# Patient Record
Sex: MALE | Race: WHITE | Employment: OTHER | URBAN - METROPOLITAN AREA
[De-identification: names, ages, dates, MRNs, and addresses within clinical notes are randomized per-mention and may not be internally consistent; named-entity substitution may affect disease eponyms.]

---

## 2017-01-05 ENCOUNTER — PATIENT OUTREACH (OUTPATIENT)
Dept: FAMILY MEDICINE CLINIC | Facility: CLINIC | Age: 67
End: 2017-01-05

## 2017-01-20 ENCOUNTER — PRIOR ORIGINAL RECORDS (OUTPATIENT)
Dept: OTHER | Age: 67
End: 2017-01-20

## 2017-01-20 ENCOUNTER — OFFICE VISIT (OUTPATIENT)
Dept: FAMILY MEDICINE CLINIC | Facility: CLINIC | Age: 67
End: 2017-01-20

## 2017-01-20 VITALS
DIASTOLIC BLOOD PRESSURE: 88 MMHG | SYSTOLIC BLOOD PRESSURE: 132 MMHG | TEMPERATURE: 97 F | RESPIRATION RATE: 16 BRPM | WEIGHT: 196 LBS | HEART RATE: 76 BPM | BODY MASS INDEX: 27.44 KG/M2 | HEIGHT: 71 IN

## 2017-01-20 DIAGNOSIS — Z28.21 IMMUNIZATION NOT CARRIED OUT BECAUSE OF PATIENT REFUSAL: ICD-10-CM

## 2017-01-20 DIAGNOSIS — Z79.82 ENCOUNTER FOR LONG-TERM (CURRENT) USE OF ASPIRIN: ICD-10-CM

## 2017-01-20 DIAGNOSIS — Z85.038 HISTORY OF COLON CANCER: ICD-10-CM

## 2017-01-20 DIAGNOSIS — Z86.79 HISTORY OF ATRIAL FIBRILLATION: ICD-10-CM

## 2017-01-20 DIAGNOSIS — Z12.5 SCREENING FOR PROSTATE CANCER: ICD-10-CM

## 2017-01-20 DIAGNOSIS — R93.0 ABNORMAL MAGNETIC RESONANCE IMAGING OF HEAD: ICD-10-CM

## 2017-01-20 DIAGNOSIS — I35.0 NONRHEUMATIC AORTIC VALVE STENOSIS: ICD-10-CM

## 2017-01-20 DIAGNOSIS — C18.7 CANCER OF SIGMOID COLON (HCC): ICD-10-CM

## 2017-01-20 DIAGNOSIS — E11.42 TYPE 2 DIABETES MELLITUS WITH DIABETIC POLYNEUROPATHY, WITHOUT LONG-TERM CURRENT USE OF INSULIN (HCC): ICD-10-CM

## 2017-01-20 DIAGNOSIS — E11.22 TYPE 2 DIABETES MELLITUS WITH STAGE 3 CHRONIC KIDNEY DISEASE, WITHOUT LONG-TERM CURRENT USE OF INSULIN (HCC): ICD-10-CM

## 2017-01-20 DIAGNOSIS — Z00.00 ENCOUNTER FOR ANNUAL HEALTH EXAMINATION: Primary | ICD-10-CM

## 2017-01-20 DIAGNOSIS — Z89.412 STATUS POST AMPUTATION OF GREAT TOE, LEFT (HCC): ICD-10-CM

## 2017-01-20 DIAGNOSIS — N18.30 TYPE 2 DIABETES MELLITUS WITH STAGE 3 CHRONIC KIDNEY DISEASE, WITHOUT LONG-TERM CURRENT USE OF INSULIN (HCC): ICD-10-CM

## 2017-01-20 DIAGNOSIS — I73.9 PVD (PERIPHERAL VASCULAR DISEASE) (HCC): ICD-10-CM

## 2017-01-20 DIAGNOSIS — M20.42 HAMMER TOE OF LEFT FOOT: ICD-10-CM

## 2017-01-20 DIAGNOSIS — Z91.81 AT RISK FOR FALLING: ICD-10-CM

## 2017-01-20 DIAGNOSIS — N18.30 CHRONIC KIDNEY DISEASE, STAGE 3, MOD DECREASED GFR (HCC): ICD-10-CM

## 2017-01-20 DIAGNOSIS — Z87.39 HISTORY OF OSTEOMYELITIS: ICD-10-CM

## 2017-01-20 DIAGNOSIS — Z23 NEED FOR VACCINATION: ICD-10-CM

## 2017-01-20 LAB
BASOPHILS # BLD AUTO: 0.04 X10(3) UL (ref 0–0.1)
BASOPHILS NFR BLD AUTO: 0.6 %
BUN BLD-MCNC: 25 MG/DL (ref 8–20)
CALCIUM BLD-MCNC: 9.5 MG/DL (ref 8.3–10.3)
CHLORIDE: 105 MMOL/L (ref 101–111)
CHOLEST SMN-MCNC: 156 MG/DL (ref ?–200)
CO2: 23 MMOL/L (ref 22–32)
COMPLEXED PSA SERPL-MCNC: 1.34 NG/ML (ref 0.01–4)
CREAT BLD-MCNC: 2.49 MG/DL (ref 0.7–1.3)
EOSINOPHIL # BLD AUTO: 0.26 X10(3) UL (ref 0–0.3)
EOSINOPHIL NFR BLD AUTO: 3.9 %
ERYTHROCYTE [DISTWIDTH] IN BLOOD BY AUTOMATED COUNT: 13.1 % (ref 11.5–16)
EST. AVERAGE GLUCOSE BLD GHB EST-MCNC: 143 MG/DL (ref 68–126)
GLUCOSE BLD-MCNC: 241 MG/DL (ref 70–99)
HBA1C MFR BLD HPLC: 6.6 % (ref ?–5.7)
HCT VFR BLD AUTO: 44.6 % (ref 37–53)
HDLC SERPL-MCNC: 39 MG/DL (ref 45–?)
HDLC SERPL: 4 {RATIO} (ref ?–4.97)
HGB BLD-MCNC: 15.5 G/DL (ref 13–17)
IMMATURE GRANULOCYTE COUNT: 0.02 X10(3) UL (ref 0–1)
IMMATURE GRANULOCYTE RATIO %: 0.3 %
LDLC SERPL DIRECT ASSAY-MCNC: 64 MG/DL (ref ?–100)
LYMPHOCYTES # BLD AUTO: 1.49 X10(3) UL (ref 0.9–4)
LYMPHOCYTES NFR BLD AUTO: 22.3 %
MCH RBC QN AUTO: 29 PG (ref 27–33.2)
MCHC RBC AUTO-ENTMCNC: 34.8 G/DL (ref 31–37)
MCV RBC AUTO: 83.5 FL (ref 80–99)
MONOCYTES # BLD AUTO: 0.4 X10(3) UL (ref 0.1–0.6)
MONOCYTES NFR BLD AUTO: 6 %
NEUTROPHIL ABS PRELIM: 4.47 X10 (3) UL (ref 1.3–6.7)
NEUTROPHILS # BLD AUTO: 4.47 X10(3) UL (ref 1.3–6.7)
NEUTROPHILS NFR BLD AUTO: 66.9 %
NONHDLC SERPL-MCNC: 117 MG/DL (ref ?–130)
PLATELET # BLD AUTO: 118 10(3)UL (ref 150–450)
POTASSIUM SERPL-SCNC: 4.4 MMOL/L (ref 3.6–5.1)
RBC # BLD AUTO: 5.34 X10(6)UL (ref 3.8–5.8)
RED CELL DISTRIBUTION WIDTH-SD: 39.3 FL (ref 35.1–46.3)
SODIUM SERPL-SCNC: 139 MMOL/L (ref 136–144)
TRIGLYCERIDES: 663 MG/DL (ref ?–150)
WBC # BLD AUTO: 6.7 X10(3) UL (ref 4–13)

## 2017-01-20 PROCEDURE — 96160 PT-FOCUSED HLTH RISK ASSMT: CPT | Performed by: FAMILY MEDICINE

## 2017-01-20 PROCEDURE — 85025 COMPLETE CBC W/AUTO DIFF WBC: CPT | Performed by: FAMILY MEDICINE

## 2017-01-20 PROCEDURE — 80048 BASIC METABOLIC PNL TOTAL CA: CPT | Performed by: FAMILY MEDICINE

## 2017-01-20 PROCEDURE — 83721 ASSAY OF BLOOD LIPOPROTEIN: CPT | Performed by: FAMILY MEDICINE

## 2017-01-20 PROCEDURE — 80061 LIPID PANEL: CPT | Performed by: FAMILY MEDICINE

## 2017-01-20 PROCEDURE — 83036 HEMOGLOBIN GLYCOSYLATED A1C: CPT | Performed by: FAMILY MEDICINE

## 2017-01-20 NOTE — PROGRESS NOTES
Katharine Nickerson is a 77year old male who presents for a MA Supervisit. Doing well today overall. Gained another 12 lbs since he was last here (about 3 months ago).       Patient Care Team: Patient Care Team:  Eric Lopez DO as PCP - General ([facility] 180 tablet Rfl: 0   pregabalin (LYRICA) 75 MG Oral Cap Take 1 capsule (75 mg total) by mouth 3 (three) times daily. Disp: 154 capsule Rfl: 0   Omega-3 Fatty Acids (FISH OIL) 1200 MG Oral Cap Take 4 capsules by mouth [email or web address]  Disp:  Rfl:    multiple angelo well-being?: Social Interaction    If you are a male age 38-65 or a female age 47-67, do you take aspirin?: Yes    Have you had any immunizations at another office such as Influenza, Hepatitis B, Tetanus, or Pneumococcal?: No     Functional Ability     [facility] Correct    What year is it?: Correct    Recall \"Ball\": Correct    Recall \"Flag\": Correct    Recall \"Tree\": Correct         PREVENTATIVE SERVICES  INDICATIONS AND SCHEDULE Internal Lab or Procedure External Lab or Procedure   Diabetes Screening      H Annual Monitoring of Persistent     Medications (ACE/ARB, digoxin, diuretics)    Potassium  Annually POTASSIUM (mmol/L)   Date Value   11/03/2016 4.6    No flowsheet data found.     Creatinine  Annually CREATININE (mg/dL)   Date Value   11/03/2016 2.29* History of blood transfusion      March/2016   • Renal disorder    • History of stomach ulcers      recent gi bleed-March/2016   • Generalized weakness      uses a cane   • High blood pressure    • Esophageal reflux    • Muscle weakness    • Abdominal mass headaches  PSYCHE: denies depression or anxiety  HEMATOLOGIC: denies hx of anemia  ENDOCRINE: denies thyroid history  ALL/ASTHMA: denies hx of allergy or asthma    EXAM:   /88 mmHg  Pulse 76  Temp(Src) 96.9 °F (36.1 °C) (Tympanic)  Resp 16  Ht 71\" decreased GFR  -     Basic Metabolic Panel (8) [E]  -     Hemoglobin A1C [E]  - recheck labs today.      Type 2 diabetes mellitus with diabetic polyneuropathy, without long-term current use of insulin (HCC)  -     Basic Metabolic Panel (8) [E]  -     Hemogl selected administration types on file for this patient.

## 2017-01-20 NOTE — PROGRESS NOTES
Last A1C 5.9 on 10/13/16  Last urine microalbumin 3/31/16  Last lipid 4/5/16  Last foot exam 1/19/16  Last eye exam 10/14/16

## 2017-01-20 NOTE — PATIENT INSTRUCTIONS
Nelsy Ariza's SCREENING SCHEDULE   Tests on this list are recommended by your physician but may not be covered, or covered at this frequency, by your insurer. Please check with your insurance carrier before scheduling to verify coverage.     Kirk Schmidt Covered every 10 years- more often if abnormal Colonoscopy,10 Years due on 04/03/2026 Update Health Maintenance if applicable    Flex Sigmoidoscopy Screen  Covered every 5 years No results found for this or any previous visit. No flowsheet data found. for this or any previous visit. This may be covered with your prescription benefits, but Medicare does not cover unless Medically needed    Zoster (Not covered by Medicare Part B) No orders found for this or any previous visit.  This may be covered with you have skid proof backing. ? Do not use slippery wax on bare floors. ? Keep furniture in its accustomed place. ? If you have pets, be careful that you don’t trip over them. OUTSIDE SAFETY TIPS  ?  Always wear good shoes with proper support and traction

## 2017-01-21 ENCOUNTER — LAB ENCOUNTER (OUTPATIENT)
Dept: LAB | Age: 67
End: 2017-01-21
Attending: FAMILY MEDICINE
Payer: MEDICARE

## 2017-01-21 DIAGNOSIS — E11.65 TYPE 2 DIABETES MELLITUS WITH HYPERGLYCEMIA (HCC): Primary | ICD-10-CM

## 2017-01-21 LAB
CREAT UR-SCNC: 56.2 MG/DL
MICROALBUMIN UR-MCNC: 6.32 MG/DL
MICROALBUMIN/CREAT 24H UR-RTO: 112.5 UG/MG (ref ?–30)

## 2017-01-21 PROCEDURE — 82570 ASSAY OF URINE CREATININE: CPT

## 2017-01-21 PROCEDURE — 82043 UR ALBUMIN QUANTITATIVE: CPT

## 2017-01-23 DIAGNOSIS — E78.1 HYPERTRIGLYCERIDEMIA: ICD-10-CM

## 2017-01-23 DIAGNOSIS — E11.42 TYPE 2 DIABETES MELLITUS WITH DIABETIC POLYNEUROPATHY, WITHOUT LONG-TERM CURRENT USE OF INSULIN (HCC): Primary | ICD-10-CM

## 2017-01-24 ENCOUNTER — TELEPHONE (OUTPATIENT)
Dept: CARDIOLOGY CLINIC | Facility: HOSPITAL | Age: 67
End: 2017-01-24

## 2017-01-24 ENCOUNTER — TELEPHONE (OUTPATIENT)
Dept: FAMILY MEDICINE CLINIC | Facility: CLINIC | Age: 67
End: 2017-01-24

## 2017-01-24 ENCOUNTER — PRIOR ORIGINAL RECORDS (OUTPATIENT)
Dept: OTHER | Age: 67
End: 2017-01-24

## 2017-01-24 DIAGNOSIS — E11.42 TYPE 2 DIABETES MELLITUS WITH DIABETIC POLYNEUROPATHY, WITHOUT LONG-TERM CURRENT USE OF INSULIN (HCC): Primary | ICD-10-CM

## 2017-01-24 DIAGNOSIS — E78.1 PURE HYPERGLYCERIDEMIA: ICD-10-CM

## 2017-01-24 RX ORDER — GLIPIZIDE 5 MG/1
5 TABLET, FILM COATED, EXTENDED RELEASE ORAL DAILY
Qty: 90 TABLET | Refills: 0 | Status: SHIPPED | OUTPATIENT
Start: 2017-01-24 | End: 2017-04-17

## 2017-01-24 RX ORDER — ATORVASTATIN CALCIUM 10 MG/1
10 TABLET, FILM COATED ORAL NIGHTLY
Qty: 90 TABLET | Refills: 0 | Status: SHIPPED | OUTPATIENT
Start: 2017-01-24 | End: 2017-04-25

## 2017-01-24 NOTE — TELEPHONE ENCOUNTER
Patient wife was notified of lab results.   States she would like Glipizide and Atorvastatin sent to [facility] [address] in [place]    Routed to Dr Shanel Simental for orders

## 2017-01-24 NOTE — PROGRESS NOTES
I spoke with patient's wife and provided preop/admission instructions for upcoming transcatheter valve surgery.  I advised him to come in the day prior to surgery Wed 2/1 at 11am, Melissa reyes and register as a direct admission with ER registration.  He is

## 2017-01-26 PROBLEM — Z86.79 HISTORY OF ATRIAL FIBRILLATION: Status: RESOLVED | Noted: 2017-01-26 | Resolved: 2017-01-26

## 2017-01-26 PROBLEM — Z86.79 HISTORY OF ATRIAL FIBRILLATION: Status: ACTIVE | Noted: 2017-01-26

## 2017-01-26 NOTE — H&P
[facility] Patient Status:  Inpatient    1950 MRN [id number]   [facility] 8NE-A Attending Juan Kelly MD   Hosp Day #  PCP Gabriele Orta DO     PCP:   Dr. Tiwari Organ  Cardiologist:  Dr. Sage Duque ENDOSCOPY,EXAM      COLONOSCOPY N/A 4/3/2016    Comment Procedure: COLONOSCOPY;  Surgeon: Erick Zheng MD;  Location: [address] [place] [address] ENDOSCOPY    COLONOSCOPY N/A 4/1/2016    Comment Procedure: COLONOSCOPY;  Surgeon: Erick Zheng MD;  Location: [address] [place] [address] illness    Physical Exam:   There were no vitals taken for this visit. No data recorded.      No intake or output data in the 24 hours ending 01/26/17 1054  Weight: 86kg    Telemetry: NSR      Physical exam:  General: Alert and oriented in no apparent dist coronary artery had a long 50% to 60% midvessel stenosis. Left circumflex coronary artery had a 40% to 50% midvessel stenosis and a 50% distal stenosis.   The right coronary artery was a small nondominant vessel with no angiographic evidence of significant consult for management of DM preop  - risks, benefits, and alternatives discussed with patient and family by TAVR physicians prior to admission, understand and agree to proceed    Pardeep HALL  2/1/17  1300

## 2017-01-26 NOTE — HISTORICAL OFFICE NOTE
Irina Bañuelos  : 1950  ACCOUNT:  [id number]  DATE: 2016    Thuan Nicole M.D. [facility] CL[facility]IC     HPI: Mr. Roslyn Bryan returns for a followup visit. We last saw him here a few months ago.   He had issues with a GI mass and thereafter has clear decided to go ahead and repeat his echo and start making plans in terms of CT angiography to start assessing mode of entry for his eventual TAVR which we hope to get done in the next 3 months or so. The patient is acceptable with this plan.   He will get a

## 2017-01-30 ENCOUNTER — PATIENT MESSAGE (OUTPATIENT)
Dept: FAMILY MEDICINE CLINIC | Facility: CLINIC | Age: 67
End: 2017-01-30

## 2017-01-30 DIAGNOSIS — E11.42 TYPE 2 DIABETES MELLITUS WITH DIABETIC POLYNEUROPATHY, WITHOUT LONG-TERM CURRENT USE OF INSULIN (HCC): Primary | ICD-10-CM

## 2017-01-30 DIAGNOSIS — E11.40 CHRONIC PAINFUL DIABETIC NEUROPATHY (HCC): ICD-10-CM

## 2017-01-30 NOTE — TELEPHONE ENCOUNTER
From: Judit Washington  To: Lindsey Ayers DO  Sent: 1/30/2017 3:18 PM CST  Subject: Non-Urgent Medical Question    Hi Dr. Hubert Bill,    I wanted to let you know that Meño Caban is (finally!) having his heart valve replacement surgery this Thursday.  He also is go

## 2017-01-31 RX ORDER — PREGABALIN 75 MG/1
75 CAPSULE ORAL 3 TIMES DAILY
Qty: 270 CAPSULE | Refills: 1 | Status: SHIPPED | OUTPATIENT
Start: 2017-01-31 | End: 2017-02-13

## 2017-01-31 RX ORDER — PREGABALIN 75 MG/1
75 CAPSULE ORAL 3 TIMES DAILY
Qty: 270 CAPSULE | Refills: 0 | Status: SHIPPED
Start: 2017-01-31 | End: 2017-01-31

## 2017-01-31 NOTE — TELEPHONE ENCOUNTER
Spoke with patient wife who states patient received 90 tabs in November from [employer] assistance. Patient has several tabs left.  Requests script for Lyrica be sent to the assistance plan and cancel script at [address] [place] [address] St wife to call [employer] to confirm

## 2017-01-31 NOTE — TELEPHONE ENCOUNTER
See [employer] message. I'm going to print a script for lyrica. Please fax. Lets see how much his insurance pays for it at this point and then see if we need to do anything to help them afford it. We had given them some samples last time, I think.  Don't know

## 2017-01-31 NOTE — TELEPHONE ENCOUNTER
Spoke with Alis Khanna at [facility] who states it will cost $141 for a 3 months supply. Spoke with Umesh Lopes at [facility] who states patient has been enrolled in the program and can receive prescriptions.   States our office will need to fax a script to [phone]

## 2017-02-01 ENCOUNTER — APPOINTMENT (OUTPATIENT)
Dept: GENERAL RADIOLOGY | Facility: HOSPITAL | Age: 67
DRG: 267 | End: 2017-02-01
Attending: NURSE PRACTITIONER
Payer: MEDICARE

## 2017-02-01 ENCOUNTER — ANESTHESIA EVENT (OUTPATIENT)
Dept: CARDIAC SURGERY | Facility: HOSPITAL | Age: 67
End: 2017-02-01

## 2017-02-01 ENCOUNTER — HOSPITAL ENCOUNTER (INPATIENT)
Facility: HOSPITAL | Age: 67
LOS: 3 days | Discharge: HOME | DRG: 267 | End: 2017-02-04
Attending: INTERNAL MEDICINE | Admitting: INTERNAL MEDICINE
Payer: MEDICARE

## 2017-02-01 DIAGNOSIS — N18.30 CHRONIC KIDNEY DISEASE, STAGE 3, MOD DECREASED GFR (HCC): ICD-10-CM

## 2017-02-01 DIAGNOSIS — I35.0 AORTIC VALVE STENOSIS, UNSPECIFIED ETIOLOGY: Primary | ICD-10-CM

## 2017-02-01 DIAGNOSIS — E11.42 TYPE 2 DIABETES MELLITUS WITH DIABETIC POLYNEUROPATHY, WITHOUT LONG-TERM CURRENT USE OF INSULIN (HCC): ICD-10-CM

## 2017-02-01 PROBLEM — I25.10 CAD (CORONARY ARTERY DISEASE): Status: ACTIVE | Noted: 2017-02-01

## 2017-02-01 LAB
ALBUMIN SERPL-MCNC: 3.6 G/DL (ref 3.5–4.8)
ALP LIVER SERPL-CCNC: 106 U/L (ref 45–117)
ALT SERPL-CCNC: 25 U/L (ref 17–63)
ANTIBODY SCREEN: NEGATIVE
AST SERPL-CCNC: 19 U/L (ref 15–41)
BASOPHILS # BLD AUTO: 0.05 X10(3) UL (ref 0–0.1)
BASOPHILS NFR BLD AUTO: 0.6 %
BILIRUB SERPL-MCNC: 0.8 MG/DL (ref 0.1–2)
BUN BLD-MCNC: 23 MG/DL (ref 8–20)
CALCIUM BLD-MCNC: 8.9 MG/DL (ref 8.3–10.3)
CHLORIDE: 110 MMOL/L (ref 101–111)
CO2: 24 MMOL/L (ref 22–32)
CREAT BLD-MCNC: 2.21 MG/DL (ref 0.7–1.3)
EOSINOPHIL # BLD AUTO: 0.24 X10(3) UL (ref 0–0.3)
EOSINOPHIL NFR BLD AUTO: 3 %
ERYTHROCYTE [DISTWIDTH] IN BLOOD BY AUTOMATED COUNT: 13.3 % (ref 11.5–16)
EST. AVERAGE GLUCOSE BLD GHB EST-MCNC: 146 MG/DL (ref 68–126)
GLUCOSE BLD-MCNC: 101 MG/DL (ref 65–99)
GLUCOSE BLD-MCNC: 112 MG/DL (ref 65–99)
GLUCOSE BLD-MCNC: 126 MG/DL (ref 65–99)
GLUCOSE BLD-MCNC: 149 MG/DL (ref 70–99)
HAV IGM SER QL: 2 MG/DL (ref 1.7–3)
HBA1C MFR BLD HPLC: 6.7 % (ref ?–5.7)
HCT VFR BLD AUTO: 41.9 % (ref 37–53)
HGB BLD-MCNC: 14.7 G/DL (ref 13–17)
IMMATURE GRANULOCYTE COUNT: 0.04 X10(3) UL (ref 0–1)
IMMATURE GRANULOCYTE RATIO %: 0.5 %
INR BLD: 0.98 (ref 0.89–1.12)
LYMPHOCYTES # BLD AUTO: 2.11 X10(3) UL (ref 0.9–4)
LYMPHOCYTES NFR BLD AUTO: 26.3 %
M PROTEIN MFR SERPL ELPH: 7.4 G/DL (ref 6.1–8.3)
MCH RBC QN AUTO: 28.8 PG (ref 27–33.2)
MCHC RBC AUTO-ENTMCNC: 35.1 G/DL (ref 31–37)
MCV RBC AUTO: 82.2 FL (ref 80–99)
MONOCYTES # BLD AUTO: 0.5 X10(3) UL (ref 0.1–0.6)
MONOCYTES NFR BLD AUTO: 6.2 %
NEUTROPHIL ABS PRELIM: 5.08 X10 (3) UL (ref 1.3–6.7)
NEUTROPHILS # BLD AUTO: 5.08 X10(3) UL (ref 1.3–6.7)
NEUTROPHILS NFR BLD AUTO: 63.4 %
PLATELET # BLD AUTO: 143 10(3)UL (ref 150–450)
POTASSIUM SERPL-SCNC: 4.6 MMOL/L (ref 3.6–5.1)
PRO-BETA NATRIURETIC PEPTIDE: 681 PG/ML (ref ?–125)
PSA SERPL DL<=0.01 NG/ML-MCNC: 13.3 SECONDS (ref 12.3–14.8)
RBC # BLD AUTO: 5.1 X10(6)UL (ref 3.8–5.8)
RED CELL DISTRIBUTION WIDTH-SD: 38.8 FL (ref 35.1–46.3)
RH BLOOD TYPE: POSITIVE
SODIUM SERPL-SCNC: 142 MMOL/L (ref 136–144)
WBC # BLD AUTO: 8 X10(3) UL (ref 4–13)

## 2017-02-01 PROCEDURE — 83735 ASSAY OF MAGNESIUM: CPT | Performed by: NURSE PRACTITIONER

## 2017-02-01 PROCEDURE — 71010 XR CHEST AP PORTABLE  (CPT=71010): CPT

## 2017-02-01 PROCEDURE — 83036 HEMOGLOBIN GLYCOSYLATED A1C: CPT | Performed by: NURSE PRACTITIONER

## 2017-02-01 PROCEDURE — 80053 COMPREHEN METABOLIC PANEL: CPT | Performed by: NURSE PRACTITIONER

## 2017-02-01 PROCEDURE — 83880 ASSAY OF NATRIURETIC PEPTIDE: CPT | Performed by: NURSE PRACTITIONER

## 2017-02-01 PROCEDURE — 99222 1ST HOSP IP/OBS MODERATE 55: CPT | Performed by: INTERNAL MEDICINE

## 2017-02-01 PROCEDURE — 05H533Z INSERTION OF INFUSION DEVICE INTO RIGHT SUBCLAVIAN VEIN, PERCUTANEOUS APPROACH: ICD-10-PCS | Performed by: INTERNAL MEDICINE

## 2017-02-01 PROCEDURE — 85610 PROTHROMBIN TIME: CPT | Performed by: NURSE PRACTITIONER

## 2017-02-01 RX ORDER — PANTOPRAZOLE SODIUM 40 MG/1
40 TABLET, DELAYED RELEASE ORAL
Status: DISCONTINUED | OUTPATIENT
Start: 2017-02-02 | End: 2017-02-02

## 2017-02-01 RX ORDER — SODIUM CHLORIDE 9 MG/ML
INJECTION, SOLUTION INTRAVENOUS CONTINUOUS
Status: DISCONTINUED | OUTPATIENT
Start: 2017-02-01 | End: 2017-02-02

## 2017-02-01 RX ORDER — SODIUM CHLORIDE 0.9 % (FLUSH) 0.9 %
10 SYRINGE (ML) INJECTION EVERY 12 HOURS
Status: DISCONTINUED | OUTPATIENT
Start: 2017-02-01 | End: 2017-02-02

## 2017-02-01 RX ORDER — ASPIRIN 81 MG/1
81 TABLET ORAL DAILY
Status: DISCONTINUED | OUTPATIENT
Start: 2017-02-01 | End: 2017-02-01

## 2017-02-01 RX ORDER — PREGABALIN 75 MG/1
75 CAPSULE ORAL 3 TIMES DAILY
Status: DISCONTINUED | OUTPATIENT
Start: 2017-02-01 | End: 2017-02-04

## 2017-02-01 RX ORDER — ZOLPIDEM TARTRATE 5 MG/1
5 TABLET ORAL NIGHTLY PRN
Status: DISCONTINUED | OUTPATIENT
Start: 2017-02-01 | End: 2017-02-04

## 2017-02-01 RX ORDER — LOPERAMIDE HYDROCHLORIDE 2 MG/1
2 CAPSULE ORAL 4 TIMES DAILY PRN
Status: DISCONTINUED | OUTPATIENT
Start: 2017-02-01 | End: 2017-02-04

## 2017-02-01 RX ORDER — CHLORHEXIDINE GLUCONATE 4 G/100ML
30 SOLUTION TOPICAL ONCE
Status: COMPLETED | OUTPATIENT
Start: 2017-02-01 | End: 2017-02-01

## 2017-02-01 RX ORDER — ATORVASTATIN CALCIUM 10 MG/1
10 TABLET, FILM COATED ORAL NIGHTLY
Status: DISCONTINUED | OUTPATIENT
Start: 2017-02-01 | End: 2017-02-04

## 2017-02-01 RX ORDER — DEXTROSE MONOHYDRATE 25 G/50ML
50 INJECTION, SOLUTION INTRAVENOUS
Status: DISCONTINUED | OUTPATIENT
Start: 2017-02-01 | End: 2017-02-02

## 2017-02-01 RX ORDER — METOPROLOL TARTRATE 50 MG/1
50 TABLET, FILM COATED ORAL 2 TIMES DAILY
Status: DISCONTINUED | OUTPATIENT
Start: 2017-02-01 | End: 2017-02-04

## 2017-02-01 RX ORDER — DILTIAZEM HYDROCHLORIDE 120 MG/1
120 CAPSULE, EXTENDED RELEASE ORAL DAILY
Status: DISCONTINUED | OUTPATIENT
Start: 2017-02-01 | End: 2017-02-03

## 2017-02-01 NOTE — PLAN OF CARE
NURSING ADMISSION NOTE      Patient admitted via direct. Oriented to room. Safety precautions initiated. Bed in low position. Call light in reach.

## 2017-02-01 NOTE — CONSULTS
Cardiothoracic Surgery  TAVR Consult    2nd Opinion TAVR Consult  2/1/17  Constance Noe and Irene Richardson    77year old with symptomatic aortic stenosis, with increased fatigue and dyspnea on exertion    PMH: PAD, DM type 2, hx GI bleed 3/2016, colon cancer s/p evelia significant obstructive atherosclerosis. Left anterior descending coronary artery had a long 50% to 60% midvessel stenosis. Left circumflex coronary artery had a 40% to 50% midvessel stenosis and a 50% distal stenosis.   The right coronary artery was a sm

## 2017-02-01 NOTE — ANESTHESIA PREPROCEDURE EVALUATION
PRE-OP EVALUATION    Patient Name: Adam Randall    Pre-op Diagnosis: aortic stenosis    Procedure(s):  transcatheter arotic valve replacement, right femoral approach, 26mm,  percutaneous    Surgeon(s) and Role:     * Leoncio Snyder MD - Primary     * Go indicates no known allergies. Anesthesia Evaluation    Patient summary reviewed.     Anesthetic Complications           GI/Hepatic/Renal      (+) GERD       (+) chronic renal disease and CRI      (+) colon cancer             Cardiovascular  Comment: Co atherosclerosis.  This is occurring in the setting of preserved LV systolic function and severe aortic stenosis.  There was no evidence of pulmonary artery hypertension.     As described above, patient has heavily calcified and diffusely atherosclerotic inf 01/20/2017    01/20/2017   CO2 23.0 01/20/2017   BUN 25* 01/20/2017   CREATSERUM 2.49* 01/20/2017   * 01/20/2017   CA 9.5 01/20/2017            Airway      Mallampati: II  Mouth opening: 3 FB  TM distance: > 6 cm  Neck ROM: full Cardiovascular

## 2017-02-01 NOTE — CONSULTS
[facility]  Report of Consultation    Katharine Nickerson Patient Status:  Inpatient    1950 MRN [id number]   [facility] 8NE-A Attending Grey Dick MD   Hosp Day # 0 PCP Sanaz Ash DO     Reason for Consultation:  CKD    Histo Father    • Heart Disorder Brother 67     MI      reports that he quit smoking about 10 years ago. He has never used smokeless tobacco. He reports that he drinks about 15.0 oz of alcohol per week. He reports that he does not use illicit drugs.     Allergies bilaterally. No wheezes. No rhonchi. Cardiovascular: S1, S2. + DALLAS  Abdomen: Soft, nontender, nondistended. Positive bowel sounds. No rebound tenderness   Neurologic: No focal neurological deficits.    Musculoskeletal: Full range of motion of all extremi

## 2017-02-01 NOTE — CONSULTS
ENDOCRINOLOGY CONSULTATION    Attending physician:  Jam Tidwell MD  Consulting physican:  Sandrita Bill MD    Admission Date:  2/1/2017  Consultation Date:  2/1/2017      Reason for consultation: Management of Type 2 DM    Chief Complaint:  Admit Generalized weakness      uses a cane   • Esophageal reflux    • Muscle weakness    • Abdominal mass, right lower quadrant      kidney/abd area- colonoscopy scheduled 04/01/16   • Arrhythmia      afib   • Valvular disease      aortic valve   • Visual impai Take 81 mg by mouth daily.    Disp:  Rfl:    Glipizide ER 5 mg - 1 tablet daily with breakfast        CURRENT MEDICATIONS  Chlorhexidine Gluconate (HIBICLENS) 4 % liquid 30 mL 30 mL Topical Once   0.9%  NaCl infusion  Intravenous Continuous   Atorvastatin C Diabetes Neg              Physical Examination:    /79 mmHg  Pulse 56  Temp(Src) 98.1 °F (36.7 °C) (Oral)  Resp 16  Wt 188 lb (85.276 kg)  SpO2 100%  Body mass index is 26.23 kg/(m^2).     General:  Alert, oriented, no acute distress  HEENT:  Anicteri 5.10   Hemoglobin      13.0-17.0 g/dL 14.7   Hematocrit      37.0-53.0 % 41.9   Platelet Count      393.4-604.8 10(3)uL 143.0 (L)         Assessment and Plan:    1.  Type 2 DM with neuropathy, CKD 3 and other circulatory disorder (CVA, PVD): controlled as t

## 2017-02-01 NOTE — PLAN OF CARE
Glucose maintained within prescribed range Progressing      Patient/Family Long Term Goal Progressing      Patient/Family Short Term Goal Progressing    RECEIVED FROM HOME,ALERT AND OX3,TELE SR WITH HR-70S,NO SOB ROOM AIR,O2 SAT-96%,DENIES C/O PAIN,ROUTINE

## 2017-02-01 NOTE — CM/SW NOTE
02/01/17 1700   CM/SW Referral Data   Referral Source Physician   Reason for Referral Protocol order set   Specify order set TAVR   Pertinent Medical Hx   Primary Care Physician Name Ksenia Elaine   Patient Info   Patient's Mental Status Alert;Oriented

## 2017-02-01 NOTE — PROGRESS NOTES
[facility]  Progress Note    Eda Marroquin     Subjective:  No chest pain or shortness of breath at rest. Patient with severe aortic stenosis admitted today for tavr tomorrow. He has had no infectious symps. He has no bleeding issues.        Intake/ (CARDIZEM CD) 24 hr cap 120 mg 120 mg Oral Daily   Metoprolol Tartrate (LOPRESSOR) tab 50 mg 50 mg Oral BID   [START ON 2/2/2017] Pantoprazole Sodium (PROTONIX) EC tab 40 mg 40 mg Oral QAM AC   pregabalin (LYRICA) cap 75 mg 75 mg Oral TID   glucose (DEX4)

## 2017-02-02 ENCOUNTER — ANESTHESIA (OUTPATIENT)
Dept: CARDIAC SURGERY | Facility: HOSPITAL | Age: 67
End: 2017-02-02

## 2017-02-02 ENCOUNTER — APPOINTMENT (OUTPATIENT)
Dept: GENERAL RADIOLOGY | Facility: HOSPITAL | Age: 67
DRG: 267 | End: 2017-02-02
Attending: NURSE PRACTITIONER
Payer: MEDICARE

## 2017-02-02 ENCOUNTER — PRIOR ORIGINAL RECORDS (OUTPATIENT)
Dept: OTHER | Age: 67
End: 2017-02-02

## 2017-02-02 ENCOUNTER — SURGERY (OUTPATIENT)
Age: 67
End: 2017-02-02

## 2017-02-02 LAB
ALBUMIN SERPL-MCNC: 3.1 G/DL (ref 3.5–4.8)
ALP LIVER SERPL-CCNC: 89 U/L (ref 45–117)
ALT SERPL-CCNC: 19 U/L (ref 17–63)
APTT PPP: 185.6 SECONDS (ref 25–34)
AST SERPL-CCNC: 18 U/L (ref 15–41)
ATRIAL RATE: 68 BPM
BILIRUB SERPL-MCNC: 0.8 MG/DL (ref 0.1–2)
BUN BLD-MCNC: 21 MG/DL (ref 8–20)
BUN BLD-MCNC: 21 MG/DL (ref 8–20)
CALCIUM BLD-MCNC: 8.5 MG/DL (ref 8.3–10.3)
CALCIUM BLD-MCNC: 9 MG/DL (ref 8.3–10.3)
CHLORIDE: 109 MMOL/L (ref 101–111)
CHLORIDE: 111 MMOL/L (ref 101–111)
CO2: 20 MMOL/L (ref 22–32)
CO2: 24 MMOL/L (ref 22–32)
CREAT BLD-MCNC: 1.86 MG/DL (ref 0.7–1.3)
CREAT BLD-MCNC: 1.98 MG/DL (ref 0.7–1.3)
ERYTHROCYTE [DISTWIDTH] IN BLOOD BY AUTOMATED COUNT: 13.2 % (ref 11.5–16)
GLUCOSE BLD-MCNC: 115 MG/DL (ref 65–99)
GLUCOSE BLD-MCNC: 124 MG/DL (ref 70–99)
GLUCOSE BLD-MCNC: 131 MG/DL (ref 70–99)
GLUCOSE BLD-MCNC: 134 MG/DL (ref 65–99)
GLUCOSE BLD-MCNC: 139 MG/DL (ref 65–99)
GLUCOSE BLD-MCNC: 166 MG/DL (ref 65–99)
HAV IGM SER QL: 1.7 MG/DL (ref 1.7–3)
HAV IGM SER QL: 1.9 MG/DL (ref 1.7–3)
HCT VFR BLD AUTO: 37.2 % (ref 37–53)
HGB BLD-MCNC: 13.2 G/DL (ref 13–17)
INR BLD: 1.22 (ref 0.89–1.12)
ISTAT ACTIVATED CLOTTING TIME: 162 SECONDS (ref 74–137)
ISTAT ACTIVATED CLOTTING TIME: 162 SECONDS (ref 74–137)
ISTAT BLOOD GAS BASE DEFICIT: -3 MMOL/L
ISTAT BLOOD GAS HCO3: 22.4 MEQ/L (ref 22–26)
ISTAT BLOOD GAS O2 SATURATION: 100 % (ref 92–100)
ISTAT BLOOD GAS PCO2: 39 MMHG (ref 35–45)
ISTAT BLOOD GAS PH: 7.37 (ref 7.35–7.45)
ISTAT BLOOD GAS PO2: 383 MMHG (ref 80–105)
ISTAT BLOOD GAS TCO2: 24 MMOL/L (ref 22–32)
ISTAT HEMATOCRIT: 34 % (ref 37–54)
ISTAT IONIZED CALCIUM: 1.24 MMOL/L (ref 1.12–1.32)
ISTAT POTASSIUM: 4 MMOL/L (ref 3.6–5.1)
ISTAT SODIUM: 140 MMOL/L (ref 136–144)
M PROTEIN MFR SERPL ELPH: 6.6 G/DL (ref 6.1–8.3)
MCH RBC QN AUTO: 29 PG (ref 27–33.2)
MCHC RBC AUTO-ENTMCNC: 35.5 G/DL (ref 31–37)
MCV RBC AUTO: 81.8 FL (ref 80–99)
P AXIS: 48 DEGREES
P-R INTERVAL: 192 MS
PHOSPHATE SERPL-MCNC: 3 MG/DL (ref 2.5–4.9)
PLATELET # BLD AUTO: 120 10(3)UL (ref 150–450)
POTASSIUM SERPL-SCNC: 3.9 MMOL/L (ref 3.6–5.1)
POTASSIUM SERPL-SCNC: 3.9 MMOL/L (ref 3.6–5.1)
PSA SERPL DL<=0.01 NG/ML-MCNC: 15.8 SECONDS (ref 12.3–14.8)
Q-T INTERVAL: 430 MS
QRS DURATION: 88 MS
QTC CALCULATION (BEZET): 457 MS
R AXIS: 1 DEGREES
RBC # BLD AUTO: 4.55 X10(6)UL (ref 3.8–5.8)
RED CELL DISTRIBUTION WIDTH-SD: 38.5 FL (ref 35.1–46.3)
SODIUM SERPL-SCNC: 142 MMOL/L (ref 136–144)
SODIUM SERPL-SCNC: 142 MMOL/L (ref 136–144)
T AXIS: 116 DEGREES
VENTRICULAR RATE: 68 BPM
WBC # BLD AUTO: 9.4 X10(3) UL (ref 4–13)

## 2017-02-02 PROCEDURE — 02RF38Z REPLACEMENT OF AORTIC VALVE WITH ZOOPLASTIC TISSUE, PERCUTANEOUS APPROACH: ICD-10-PCS | Performed by: INTERNAL MEDICINE

## 2017-02-02 PROCEDURE — B310YZZ FLUOROSCOPY OF THORACIC AORTA USING OTHER CONTRAST: ICD-10-PCS | Performed by: INTERNAL MEDICINE

## 2017-02-02 PROCEDURE — 71010 XR CHEST AP PORTABLE  (CPT=71010): CPT

## 2017-02-02 PROCEDURE — B24BZZ4 ULTRASONOGRAPHY OF HEART WITH AORTA, TRANSESOPHAGEAL: ICD-10-PCS | Performed by: INTERNAL MEDICINE

## 2017-02-02 PROCEDURE — 99232 SBSQ HOSP IP/OBS MODERATE 35: CPT | Performed by: INTERNAL MEDICINE

## 2017-02-02 DEVICE — VALVE SAPIEN 26MM COMMANDER: Type: IMPLANTABLE DEVICE | Site: HEART | Status: FUNCTIONAL

## 2017-02-02 RX ORDER — HYDRALAZINE HYDROCHLORIDE 20 MG/ML
10 INJECTION INTRAMUSCULAR; INTRAVENOUS EVERY 4 HOURS PRN
Status: DISCONTINUED | OUTPATIENT
Start: 2017-02-02 | End: 2017-02-04

## 2017-02-02 RX ORDER — DOBUTAMINE HYDROCHLORIDE 100 MG/100ML
INJECTION INTRAVENOUS CONTINUOUS PRN
Status: DISCONTINUED | OUTPATIENT
Start: 2017-02-02 | End: 2017-02-03

## 2017-02-02 RX ORDER — SODIUM CHLORIDE 9 MG/ML
INJECTION, SOLUTION INTRAVENOUS CONTINUOUS
Status: DISCONTINUED | OUTPATIENT
Start: 2017-02-02 | End: 2017-02-04

## 2017-02-02 RX ORDER — DOCUSATE SODIUM 100 MG/1
100 CAPSULE, LIQUID FILLED ORAL 2 TIMES DAILY
Status: DISCONTINUED | OUTPATIENT
Start: 2017-02-02 | End: 2017-02-04

## 2017-02-02 RX ORDER — ONDANSETRON 2 MG/ML
4 INJECTION INTRAMUSCULAR; INTRAVENOUS EVERY 6 HOURS PRN
Status: ACTIVE | OUTPATIENT
Start: 2017-02-02 | End: 2017-02-04

## 2017-02-02 RX ORDER — POLYETHYLENE GLYCOL 3350 17 G/17G
1 POWDER, FOR SOLUTION ORAL DAILY PRN
Status: DISCONTINUED | OUTPATIENT
Start: 2017-02-02 | End: 2017-02-04

## 2017-02-02 RX ORDER — DEXTROSE MONOHYDRATE 25 G/50ML
50 INJECTION, SOLUTION INTRAVENOUS
Status: DISCONTINUED | OUTPATIENT
Start: 2017-02-02 | End: 2017-02-04

## 2017-02-02 RX ORDER — HYDROCODONE BITARTRATE AND ACETAMINOPHEN 5; 325 MG/1; MG/1
1 TABLET ORAL EVERY 4 HOURS PRN
Status: DISCONTINUED | OUTPATIENT
Start: 2017-02-02 | End: 2017-02-04

## 2017-02-02 RX ORDER — FAMOTIDINE 20 MG/1
20 TABLET ORAL DAILY
Status: DISCONTINUED | OUTPATIENT
Start: 2017-02-02 | End: 2017-02-04

## 2017-02-02 RX ORDER — FAMOTIDINE 10 MG/ML
20 INJECTION, SOLUTION INTRAVENOUS DAILY
Status: DISCONTINUED | OUTPATIENT
Start: 2017-02-02 | End: 2017-02-04

## 2017-02-02 RX ORDER — ALBUMIN, HUMAN INJ 5% 5 %
250 SOLUTION INTRAVENOUS ONCE AS NEEDED
Status: ACTIVE | OUTPATIENT
Start: 2017-02-02 | End: 2017-02-02

## 2017-02-02 RX ORDER — ACETAMINOPHEN 325 MG/1
650 TABLET ORAL EVERY 4 HOURS PRN
Status: DISCONTINUED | OUTPATIENT
Start: 2017-02-02 | End: 2017-02-04

## 2017-02-02 RX ORDER — HYDROCODONE BITARTRATE AND ACETAMINOPHEN 5; 325 MG/1; MG/1
2 TABLET ORAL EVERY 4 HOURS PRN
Status: DISCONTINUED | OUTPATIENT
Start: 2017-02-02 | End: 2017-02-04

## 2017-02-02 RX ORDER — CEFAZOLIN SODIUM 1 G/3ML
INJECTION, POWDER, FOR SOLUTION INTRAMUSCULAR; INTRAVENOUS
Status: DISCONTINUED | OUTPATIENT
Start: 2017-02-02 | End: 2017-02-02 | Stop reason: HOSPADM

## 2017-02-02 RX ORDER — NITROGLYCERIN 20 MG/100ML
INJECTION INTRAVENOUS CONTINUOUS PRN
Status: DISCONTINUED | OUTPATIENT
Start: 2017-02-02 | End: 2017-02-04

## 2017-02-02 NOTE — PROGRESS NOTES
ENDOCRINOLOGY PROGRESS NOTE    Typed by Sandrita Bill MD on 2/2/2017      S:  Pt resting in CCU. Had TAVR this morning and doing well. Family at bedside.       O:  /70 mmHg  Pulse 65  Temp(Src) 97.4 °F (36.3 °C) (Temporal)  Resp 15  Ht 72\"  Wt neuropathy, CKD 3 and other circulatory disorder (CVA, PVD): controlled as the glucoses are stable.  HgA1C 6.7%    · Continue diabetic regimen as follows:        Novolog correction of 1 Unit for every 20 above 140 with meals and bedtime      Pt did not need

## 2017-02-02 NOTE — PLAN OF CARE
0905-Received into  [id number] s/p TAVR. Rt groin dressing saturated but no further oozing,site soft, no hematoma, left venous sheath intact, sl oozing. clamped. VS & groin cks per protocol. RSR with BBB, left radial vita & bishop intact. Family at bedside.   6

## 2017-02-02 NOTE — ANESTHESIA POSTPROCEDURE EVALUATION
[facility] Patient Status:  Inpatient   Age/Gender 77year old male MRN [id number]   [facility] 6NE-A Attending Saud Mena MD   [address] Day # 1 PCP Dayana Feliciano DO       Anesthesia Post-op Note    Procedure(s):  monroe

## 2017-02-02 NOTE — PLAN OF CARE
Problem: Diabetes/Glucose Control  Goal: Glucose maintained within prescribed range  INTERVENTIONS:  - Monitor Blood Glucose as ordered  - Assess for signs and symptoms of hyperglycemia and hypoglycemia  - Administer ordered medications to maintain glucose within normal limits  INTERVENTIONS:  - Monitor labs and rhythm and assess patient for signs and symptoms of electrolyte imbalances  - Administer electrolyte replacement as ordered  - Monitor response to electrolyte replacements, including rhythm and repea

## 2017-02-02 NOTE — PAYOR COMM NOTE
Attending Physician: Juan Kelly MD    Review Type: ADMISSION   Reviewer: Ramiro Meza       Date: February 2, 2017 - 10:12 AM  Payor: [employer] MEDICARE ADV HMO  Authorization Number: N/A  Admit date: 2/1/2017 11:18 AM   Admitted from Emergency Dept. [facility] COMP METABOLIC PANEL (14) - Abnormal; Notable for the following:     Glucose 149 (*)     BUN 23 (*)     Creatinine 2.21 (*)     GFR 30 (*)     All other components within normal limits   HEMOGLOBIN A1C - Abnormal; Notable for the following:     HgbA1C 6. TYPE AND SCREEN    Narrative: The following orders were created for panel order TYPE AND SCREEN.   Procedure                               Abnormality         Status                     ---------                               -----------         ----- symptomatic aortic stenosis  Pt seen in TAVR clinic by Constance Bynum and Danis Davila  STS Risk Score: 3.1% Euroscore: 1.9%  Pt was seen and examined by me today and after reviewing the chart and talking with the patient   I agree with their assessment that the tobin

## 2017-02-02 NOTE — PROGRESS NOTES
[facility]  NELLA Note    Judit Padmini Location:      MRN [id number]   Admission Date 2/1/2017 Operation Date 2/2/2017   Attending Physician Amina Clements MD Operating Physician Adin Roldan MD     Pre-Operative Diagnosis: Aortic stenosis.   TF

## 2017-02-02 NOTE — PROGRESS NOTES
76 y/o male with severe AS, CKD, hx afib, DM, GI bleeds for TAVR    Eron Bolivar/Brent/Yasmeen  CV Gramm  NELLA Ignacio    LFA pigtail  LFV pacer  RFA sheath placed. 26 S3  Deployed.  Excellent result CO2 aortogram normal. Home in morning

## 2017-02-02 NOTE — PROGRESS NOTES
[facility]  Progress Note    Eda Carbajalyady Patient Status:  Inpatient    1950 MRN [id number]   [facility] 8NE-A Attending Saud Mena MD   Hosp Day # 1 PCP Dayana Feliciano,      Post op TAVR  Doing well  No cp/sob      Roxanne HYDROcodone-acetaminophen (NORCO) 5-325 MG per tab 2 tablet 2 tablet Oral Q4H PRN   0.9%  NaCl infusion  Intravenous Continuous   Atorvastatin Calcium (LIPITOR) tab 10 mg 10 mg Oral Nightly   diltiazem (CARDIZEM CD) 24 hr cap 120 mg 120 mg Oral Daily   M .0* 120.0*   INR 0.98  --          Recent Labs   02/01/17  1212 02/02/17  0440    142   K 4.6 3.9    109   CO2 24.0 24.0   BUN 23* 21*   CREATSERUM 2.21* 1.98*   CA 8.9 9.0   MG 2.0 1.9         Recent Labs   02/01/17  1212   ALT 25   A

## 2017-02-02 NOTE — OPERATIVE REPORT
Cardiovascular Surgery Operative Note  TAVR        INDICATIONS: 77year old with symptomatic aortic stenosis    Pt seen in TAVR clinic by Constance Hirsch and Milton Goodwin  STS Risk Score: 3.1%  Euroscore: 1.9%  The reason for TAVR is based upon the STS score 3.1%, rec

## 2017-02-02 NOTE — OPERATIVE REPORT
[facility]    PATIENT'S NAME: Yuan Lundy   ATTENDING PHYSICIAN: Mark De Paz M.D. OPERATING PHYSICIAN: Adin Roldan M.D.    PATIENT ACCOUNT#:   [de-identified]    LOCATION:  [address] [place] [address]  MEDICAL RECORD #:   [id number]       DATE OF BIRTH: placement of a 26 mm Mikael S3 valve without predilation valvuloplasty. The valve was crossed retrograde with a guidewire. NELLA imaging confirmed the wire position was free in the left ventricular cavity and did not involve the submitral apparatus.   Under

## 2017-02-03 ENCOUNTER — APPOINTMENT (OUTPATIENT)
Dept: GENERAL RADIOLOGY | Facility: HOSPITAL | Age: 67
DRG: 267 | End: 2017-02-03
Attending: NURSE PRACTITIONER
Payer: MEDICARE

## 2017-02-03 ENCOUNTER — APPOINTMENT (OUTPATIENT)
Dept: CV DIAGNOSTICS | Facility: HOSPITAL | Age: 67
DRG: 267 | End: 2017-02-03
Attending: INTERNAL MEDICINE
Payer: MEDICARE

## 2017-02-03 LAB
ATRIAL RATE: 71 BPM
BUN BLD-MCNC: 19 MG/DL (ref 8–20)
CALCIUM BLD-MCNC: 8.3 MG/DL (ref 8.3–10.3)
CHLORIDE: 111 MMOL/L (ref 101–111)
CO2: 25 MMOL/L (ref 22–32)
CREAT BLD-MCNC: 1.96 MG/DL (ref 0.7–1.3)
ERYTHROCYTE [DISTWIDTH] IN BLOOD BY AUTOMATED COUNT: 13.3 % (ref 11.5–16)
GLUCOSE BLD-MCNC: 116 MG/DL (ref 65–99)
GLUCOSE BLD-MCNC: 124 MG/DL (ref 65–99)
GLUCOSE BLD-MCNC: 131 MG/DL (ref 70–99)
GLUCOSE BLD-MCNC: 138 MG/DL (ref 65–99)
GLUCOSE BLD-MCNC: 147 MG/DL (ref 65–99)
HAV IGM SER QL: 1.8 MG/DL (ref 1.7–3)
HCT VFR BLD AUTO: 37.5 % (ref 37–53)
HGB BLD-MCNC: 12.7 G/DL (ref 13–17)
INR BLD: 1.02 (ref 0.89–1.12)
ISTAT ACTIVATED CLOTTING TIME: 358 SECONDS (ref 74–137)
MCH RBC QN AUTO: 28.8 PG (ref 27–33.2)
MCHC RBC AUTO-ENTMCNC: 33.9 G/DL (ref 31–37)
MCV RBC AUTO: 85 FL (ref 80–99)
P AXIS: -8 DEGREES
PLATELET # BLD AUTO: 100 10(3)UL (ref 150–450)
POTASSIUM SERPL-SCNC: 3.9 MMOL/L (ref 3.6–5.1)
PSA SERPL DL<=0.01 NG/ML-MCNC: 13.7 SECONDS (ref 12.3–14.8)
Q-T INTERVAL: 436 MS
QRS DURATION: 130 MS
QTC CALCULATION (BEZET): 473 MS
R AXIS: -4 DEGREES
RBC # BLD AUTO: 4.41 X10(6)UL (ref 3.8–5.8)
RED CELL DISTRIBUTION WIDTH-SD: 40.5 FL (ref 35.1–46.3)
SODIUM SERPL-SCNC: 142 MMOL/L (ref 136–144)
T AXIS: 141 DEGREES
VENTRICULAR RATE: 71 BPM
WBC # BLD AUTO: 8.6 X10(3) UL (ref 4–13)

## 2017-02-03 PROCEDURE — 99232 SBSQ HOSP IP/OBS MODERATE 35: CPT | Performed by: INTERNAL MEDICINE

## 2017-02-03 PROCEDURE — 93306 TTE W/DOPPLER COMPLETE: CPT

## 2017-02-03 PROCEDURE — 71010 XR CHEST AP PORTABLE  (CPT=71010): CPT

## 2017-02-03 PROCEDURE — 93306 TTE W/DOPPLER COMPLETE: CPT | Performed by: INTERNAL MEDICINE

## 2017-02-03 RX ORDER — ASPIRIN 81 MG/1
81 TABLET ORAL DAILY
Status: DISCONTINUED | OUTPATIENT
Start: 2017-02-03 | End: 2017-02-04

## 2017-02-03 RX ORDER — FENOFIBRATE 134 MG/1
134 CAPSULE ORAL
Status: DISCONTINUED | OUTPATIENT
Start: 2017-02-04 | End: 2017-02-03

## 2017-02-03 RX ORDER — CLOPIDOGREL BISULFATE 75 MG/1
75 TABLET ORAL DAILY
Status: DISCONTINUED | OUTPATIENT
Start: 2017-02-03 | End: 2017-02-04

## 2017-02-03 RX ORDER — FENOFIBRATE 67 MG/1
67 CAPSULE ORAL
Status: DISCONTINUED | OUTPATIENT
Start: 2017-02-04 | End: 2017-02-04

## 2017-02-03 NOTE — CM/SW NOTE
SW met with pt and his wife. Pt is s/p TAVR. He does not feel he needs HHC. Pt still on O2- sats dropped with activity today. SW to follow for ? O2 needs.   Camila Tucker, 02/03/2017, 11:56 AM

## 2017-02-03 NOTE — PHYSICAL THERAPY NOTE
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

## 2017-02-03 NOTE — PLAN OF CARE
Care asummed @ 0730. Awake, alert, denies pain, up to BR for AM care. RSR per monitor. Tolerating diet well. Up in patricio with PT/OT. 1215-Transferred via w/c to CTU 8 after report called to Haley aVlle.     CARDIOVASCULAR - ADULT    • Maintains optimal cardiac o

## 2017-02-03 NOTE — PROGRESS NOTES
[facility]  Progress Note    Ace Bazzi Patient Status:  Inpatient    1950 MRN [id number]   [facility] 8NE-A Attending Amie Christianson MD   [facility] Day # 2 PCP Greg Archer,      Doing great  No cp/sob  Denies n/v        Lawanda Atorvastatin Calcium (LIPITOR) tab 10 mg 10 mg Oral Nightly   Metoprolol Tartrate (LOPRESSOR) tab 50 mg 50 mg Oral BID   pregabalin (LYRICA) cap 75 mg 75 mg Oral TID   Zolpidem Tartrate (AMBIEN) tab 5 mg 5 mg Oral Nightly PRN   Loperamide HCl (IMODIUM) c questions or concerns.     Romayne Ponder, MD  2/3/2017

## 2017-02-03 NOTE — PLAN OF CARE
Received pt at 1930. A/o x4. R groin with primary old, saturated drsg. No active bleeding, no hematoma noted. L groin with pressure drsg, CDI. No bleeding, no hematoma noted. Pedal pulses per doppler. Pr denies any chest pain or discomfort. Pt is voiding.

## 2017-02-03 NOTE — CARDIAC REHAB
TAVR education completed with pt. Offered him ph. 2 cardiac rehab but pt not interested, wants to use the exercise facility at his residence with his wife.

## 2017-02-03 NOTE — OCCUPATIONAL THERAPY NOTE
OCCUPATIONAL THERAPY QUICK EVALUATION - INPATIENT    Room Number: 0020/9992-U  Evaluation Date: 2/3/2017     Type of Evaluation: Initial  Presenting Problem: TAVR    Physician Order: IP Consult to Occupational Therapy  Reason for Therapy:  ADL/IADL Dysfunc Comment Procedure: COLONOSCOPY;  Surgeon: Eulogio Gibbons MD;  Location: [facility] ENDOSCOPY    COLONOSCOPY N/A 4/1/2016    Comment Procedure: COLONOSCOPY;  Surgeon: Eulogio Gibbons MD;  Location: [facility] ENDOSCOPY    OTHER SURGICAL HISTORY      Comment colon (AM-PAC Scale): 57.54  CMS Modifier (G-Code): CH    FUNCTIONAL TRANSFER ASSESSMENT  Supine to Sit : Modified independent  Sit to Stand: Supervision    Skilled Therapy Provided: Pt received supine for session.   Pt able to sit EOB for eval.  Explained role o independently and at previous functional level

## 2017-02-03 NOTE — PROGRESS NOTES
[facility]  Endocrinology Progress Note    Boni Hodgkin Patient Status:  Inpatient    1950 MRN [id number]   [facility] 6NE-A Attending Wellington Clifford MD   [facility] Day # 2 PCP Carline Nava DO     Subjective:  Doing well, anticipa Range 2/1/2017 12:12   HGBA1C Latest Ref Range: <5.7 % 6.7 (H)     Assessment/Plan:    Controlled type 2 diabetes with CKD3, CVA, PVD  - A1c 6.7%   - Doing well overall with minimal insulin requirements prn  - OK to continue novolog 1:20 > 140 correction s

## 2017-02-03 NOTE — PROGRESS NOTES
02/03/17 1330   Provider Notification   Reason for Communication New consult   Provider Name Dr. Nathen Landeros   Method of Communication Page   Response Waiting for response   Notification Time [phone]     Received callback [phone], en route

## 2017-02-03 NOTE — PROGRESS NOTES
Subjective:   POD#1 RFA TAVR with 26 S3 valve. In bed on exam. Feels well. Denies cp, sob, palpitations, dizziness/lightheadedness, N/V. Chronic diarrhea.     Objective:   /78 mmHg  Pulse 78  Temp(Src) 98.9 °F (37.2 °C) (Temporal)  Resp 17  Ht 6' (1.8 02/03/2017   .0 02/03/2017   CREATSERUM 1.96 02/03/2017   BUN 19 02/03/2017    02/03/2017   K 3.9 02/03/2017    02/03/2017   CO2 25.0 02/03/2017    02/03/2017   CA 8.3 02/03/2017   INR 1.02 02/03/2017   PTP 13.7 02/03/2017   MG 1. DANYN  2/3/2017

## 2017-02-03 NOTE — PROGRESS NOTES
Seen and examined. Up in chair and doing well.     Afebrile  145/78  78 regular    Lungs clear  Ht RRR - no significant AI  abd soft  Ext groin sites look good    ECG sinus with first degree AV block and LBBB (new)    Echo pending    CXR clear    19/1.96

## 2017-02-04 ENCOUNTER — PRIOR ORIGINAL RECORDS (OUTPATIENT)
Dept: OTHER | Age: 67
End: 2017-02-04

## 2017-02-04 VITALS
DIASTOLIC BLOOD PRESSURE: 63 MMHG | HEART RATE: 61 BPM | WEIGHT: 189 LBS | BODY MASS INDEX: 25.6 KG/M2 | RESPIRATION RATE: 20 BRPM | OXYGEN SATURATION: 100 % | HEIGHT: 72 IN | SYSTOLIC BLOOD PRESSURE: 137 MMHG | TEMPERATURE: 98 F

## 2017-02-04 LAB
ATRIAL RATE: 68 BPM
BUN BLD-MCNC: 24 MG/DL (ref 8–20)
CALCIUM BLD-MCNC: 8.9 MG/DL (ref 8.3–10.3)
CHLORIDE: 109 MMOL/L (ref 101–111)
CO2: 24 MMOL/L (ref 22–32)
CREAT BLD-MCNC: 1.91 MG/DL (ref 0.7–1.3)
GLUCOSE BLD-MCNC: 128 MG/DL (ref 65–99)
GLUCOSE BLD-MCNC: 137 MG/DL (ref 70–99)
GLUCOSE BLD-MCNC: 143 MG/DL (ref 65–99)
HAV IGM SER QL: 1.9 MG/DL (ref 1.7–3)
P AXIS: -20 DEGREES
P-R INTERVAL: 190 MS
POTASSIUM SERPL-SCNC: 3.8 MMOL/L (ref 3.6–5.1)
Q-T INTERVAL: 498 MS
QRS DURATION: 150 MS
QTC CALCULATION (BEZET): 529 MS
R AXIS: -6 DEGREES
SODIUM SERPL-SCNC: 141 MMOL/L (ref 136–144)
T AXIS: 128 DEGREES
VENTRICULAR RATE: 68 BPM

## 2017-02-04 PROCEDURE — 99232 SBSQ HOSP IP/OBS MODERATE 35: CPT | Performed by: INTERNAL MEDICINE

## 2017-02-04 RX ORDER — FENOFIBRATE 67 MG/1
67 CAPSULE ORAL
Qty: 30 CAPSULE | Refills: 11 | Status: SHIPPED | OUTPATIENT
Start: 2017-02-04 | End: 2017-10-19 | Stop reason: ALTCHOICE

## 2017-02-04 RX ORDER — CLOPIDOGREL BISULFATE 75 MG/1
75 TABLET ORAL DAILY
Qty: 30 TABLET | Refills: 11 | Status: SHIPPED | OUTPATIENT
Start: 2017-02-04 | End: 2017-08-09

## 2017-02-04 RX ORDER — POTASSIUM CHLORIDE 20 MEQ/1
40 TABLET, EXTENDED RELEASE ORAL ONCE
Status: DISCONTINUED | OUTPATIENT
Start: 2017-02-04 | End: 2017-02-04

## 2017-02-04 NOTE — PLAN OF CARE
CARDIOVASCULAR - ADULT    • Maintains optimal cardiac output and hemodynamic stability Adequate for Discharge    • Absence of cardiac arrhythmias or at baseline Adequate for Discharge        GASTROINTESTINAL - ADULT    • Maintains or returns to baseline randy

## 2017-02-04 NOTE — PROGRESS NOTES
[facility]  Progress Note    Mayank Rahman     Subjective:  No chest pain or shortness of breath. Feels great.        Intake/Output:  No intake or output data in the 24 hours ending 02/04/17 1329      Wt Readings from Last 3 Encounters:  02/04/17 : (MIRALAX) powder packet 17 g 1 packet Oral Daily PRN   famoTIDine (PEPCID) tab 20 mg 20 mg Oral Daily   acetaminophen (TYLENOL) tab 650 mg 650 mg Oral Q4H PRN   HYDROcodone-acetaminophen (NORCO) 5-325 MG per tab 1 tablet 1 tablet Oral Q4H PRN   Or      HYD

## 2017-02-04 NOTE — PLAN OF CARE
Pod#4 s/p TAVR. Alert. Oriented. Had a episode of confusion. Pt left the floor and end up being on the 5th floor- bed tower. Pt stated was looking for the bathroom. Pt easily reoriented. Pt made aware can't leave the tele floor w/o permission.  Pt voiced un

## 2017-02-04 NOTE — PROGRESS NOTES
[facility]  Nephrology Progress Note    Darcie Delgado Attending:  Willy Chan MD       Assessment and Plan:    1) CKD 3- due to HTN / previous longstanding DM- near baseline. Appears euvolemic.     2) Severe AS s/p TAVR- doing well    3) HTN- on in D5W 250ml 5-300 mcg/min Intravenous Continuous PRN   docusate sodium (COLACE) cap 100 mg 100 mg Oral BID   Or      docusate sodium (COLACE) liquid 100 mg 100 mg Per NG Tube BID   magnesium hydroxide (MILK OF MAGNESIA) 400 MG/5ML suspension 30 mL 30 mL O

## 2017-02-04 NOTE — PLAN OF CARE
Patient tele dc'd. IV discontinued with catheter intact. Pt denies cp, sob, dizziness or palpitations. Pt denies calf tenderness. Pt discharge instructions reviewed with patient and spouse verbalized understanding. Post TAVR instructions reviewed.  Valve ca

## 2017-02-04 NOTE — CONSULTS
Gastroenterology Initial Consultation  I have personally seen and examined the patient. Patient Name: Jordan Freitas  Referring physician: Dr Juli Davila  Reason for consultation: diarrhea  CC: diarrhea  HPI: Mr Eduardo Landeros is here for TAVR, recovering nicely. Surgical History    CAROTID ENDARTERECTOMY Right 4/14/05    GASTRO - INTERNAL      Comment March/2016    UPPER GI ENDOSCOPY,EXAM      COLONOSCOPY N/A 4/3/2016    Comment Procedure: COLONOSCOPY;  Surgeon: Delfino Silva MD;  Location: [address] [place] [address] (NIPRIDE) 50 mg in dextrose 5 % 250 mL infusion 0.1-4 mcg/kg/min Intravenous Continuous PRN   docusate sodium (COLACE) cap 100 mg 100 mg Oral BID   Or      docusate sodium (COLACE) liquid 100 mg 100 mg Per NG Tube BID   magnesium hydroxide (MILK OF Franklin Luckey (LOPRESSOR) tab 50 mg 50 mg Oral BID   [DISCONTINUED] Pantoprazole Sodium (PROTONIX) EC tab 40 mg 40 mg Oral QAM AC   pregabalin (LYRICA) cap 75 mg 75 mg Oral TID   [DISCONTINUED] glucose (DEX4) oral liquid 15 g 15 g Oral Q15 Min PRN   [DISCONTINUED] Gluco nephrolithiasis           Psychiatric: The patient reports no history of depression, anxiety, suicidal ideation, or homicidal ideation           Oncologic: See HPI           ENT: The patient reports no hoarseness of voice, hearing loss, sinus congestion, t 109  111  111   CO2  24.0  20.0*  25.0     Recent Labs   Lab  02/01/17   1212   02/03/17   0408   RBC  5.10   < >  4.41   HGB  14.7   < >  12.7*   HCT  41.9   < >  37.5   MCV  82.2   < >  85.0   MCH  28.8   < >  28.8   MCHC  35.1   < >  33.9   RDW  13.3

## 2017-02-05 LAB — BLOOD TYPE BARCODE: 5100

## 2017-02-06 ENCOUNTER — PATIENT MESSAGE (OUTPATIENT)
Dept: FAMILY MEDICINE CLINIC | Facility: CLINIC | Age: 67
End: 2017-02-06

## 2017-02-06 NOTE — DISCHARGE SUMMARY
[facility]  Discharge Summary    Magali Cantor Patient Status:  Inpatient    1950 MRN [id number]   [facility] 8NE-A Attending No att. providers found   [facility] Day # 3 PCP Manuel Orosco DO         Admit date: 2017    Discharge 2.5m/sec, mean grad 14mm, and trivial perivalvular regurgitation. He did develop new LBBB, but did not have any symptomatic bradycardia. He suffered no postoperative complications. Once he was hemodynamically stable - he was discharged home.       Consul 1 week      Patient Instructions: Instructions after Transcatheter Valve Replacement:    ACTIVITY:  · Do NOT drive until MD gives his/her clearance. · Walk at least 3 times per day for 5-10 minutes; increase your activity gradually.   · Do NOT lift anythi the Valve Clinic 1 month after discharge; if not given an appointment please call [phone] to make an appointment. · You will need an echocardiogram at 1 month, 6 months, 12 months, and then yearly.      IMPORTANT:  · Always inform other doctors about

## 2017-02-06 NOTE — TELEPHONE ENCOUNTER
omid Tilley    Patient coming to see you 2/17/17    Future Appointments  Date Time Provider Alexei Snow   2/17/2017 11:15 AM Js Lofton DO [facility] [place]   3/2/2017 1:00 PM Christy Duenas MD [facility]   3/8/2017 8:30 AM HE

## 2017-02-06 NOTE — TELEPHONE ENCOUNTER
I would prefer seeing him a little sooner? This Friday or next Monday, maybe? Instead of almost two weeks from now?

## 2017-02-06 NOTE — TELEPHONE ENCOUNTER
Spoke with wife and appt rescheduled    Future Appointments  Date Time Provider Alexei Snow   2/13/2017 11:30 AM Fidel Noel DO [facility] [place]   3/2/2017 1:00 PM Dain Lechuga MD [facility]   3/8/2017 8:30 AM HEART FAILURE AP

## 2017-02-06 NOTE — TELEPHONE ENCOUNTER
From: Nick Soliz  To: Parag Yip DO  Sent: 2/6/2017 12:51 PM CST  Subject: Visit Follow-up Question    Dear Dr. Jen Cárdenas,  When Porfirio Carreno was discharged from the hospital on Saturday, they said he needed to see his primary care physician in a week.  So

## 2017-02-07 ENCOUNTER — PATIENT OUTREACH (OUTPATIENT)
Dept: CASE MANAGEMENT | Age: 67
End: 2017-02-07

## 2017-02-07 DIAGNOSIS — I35.0 AORTIC VALVE STENOSIS, UNSPECIFIED ETIOLOGY: Primary | ICD-10-CM

## 2017-02-07 DIAGNOSIS — Z95.2 S/P TAVR (TRANSCATHETER AORTIC VALVE REPLACEMENT): ICD-10-CM

## 2017-02-07 DIAGNOSIS — I35.0 NONRHEUMATIC AORTIC VALVE STENOSIS: ICD-10-CM

## 2017-02-07 NOTE — PROGRESS NOTES
Initial Post Discharge Follow Up   Discharge Date: 2/4/17  Contact Date: 2/7/2017    Consent Verification:  Assessment Completed With: Patient  HIPAA Verified? Yes    1. Tell me why you were in the hospital?  Per Ashley Cruz he was in for a procedure.  DX: Aor Metoprolol Tartrate 50 MG Oral Tab Take 1 tablet (50 mg total) by mouth 2 (two) times daily. Disp: 180 tablet Rfl: 0   Omega-3 Fatty Acids (FISH OIL) 1200 MG Oral Cap Take 4 capsules by mouth [email or web address].  Disp:  Rfl:    multiple vitamin (MVI) Oral Chew Ta - [facility])    Please come in through the [employer], stop at [employer] registration desk in the [employer] lobby to check-in for the appointment.   Then walk through the [place] lobby to the gold elevators beyond the staircase,

## 2017-02-08 LAB — MAGNESIUM: 1.9 MG/DL

## 2017-02-10 ENCOUNTER — LAB ENCOUNTER (OUTPATIENT)
Dept: LAB | Age: 67
End: 2017-02-10
Attending: INTERNAL MEDICINE
Payer: MEDICARE

## 2017-02-10 ENCOUNTER — PRIOR ORIGINAL RECORDS (OUTPATIENT)
Dept: OTHER | Age: 67
End: 2017-02-10

## 2017-02-10 DIAGNOSIS — N18.30 CHRONIC KIDNEY DISEASE, STAGE 3, MOD DECREASED GFR (HCC): ICD-10-CM

## 2017-02-10 LAB
BASOPHILS # BLD AUTO: 0.06 X10(3) UL (ref 0–0.1)
BASOPHILS NFR BLD AUTO: 0.8 %
BUN BLD-MCNC: 22 MG/DL (ref 8–20)
CALCIUM BLD-MCNC: 9.2 MG/DL (ref 8.3–10.3)
CHLORIDE: 109 MMOL/L (ref 101–111)
CO2: 24 MMOL/L (ref 22–32)
CREAT BLD-MCNC: 2 MG/DL (ref 0.7–1.3)
EOSINOPHIL # BLD AUTO: 0.39 X10(3) UL (ref 0–0.3)
EOSINOPHIL NFR BLD AUTO: 5.2 %
ERYTHROCYTE [DISTWIDTH] IN BLOOD BY AUTOMATED COUNT: 13.8 % (ref 11.5–16)
GLUCOSE BLD-MCNC: 160 MG/DL (ref 70–99)
HCT VFR BLD AUTO: 34.3 % (ref 37–53)
HGB BLD-MCNC: 11.4 G/DL (ref 13–17)
IMMATURE GRANULOCYTE COUNT: 0.02 X10(3) UL (ref 0–1)
IMMATURE GRANULOCYTE RATIO %: 0.3 %
LYMPHOCYTES # BLD AUTO: 1.16 X10(3) UL (ref 0.9–4)
LYMPHOCYTES NFR BLD AUTO: 15.4 %
MCH RBC QN AUTO: 28.5 PG (ref 27–33.2)
MCHC RBC AUTO-ENTMCNC: 33.2 G/DL (ref 31–37)
MCV RBC AUTO: 85.8 FL (ref 80–99)
MONOCYTES # BLD AUTO: 0.48 X10(3) UL (ref 0.1–0.6)
MONOCYTES NFR BLD AUTO: 6.4 %
NEUTROPHIL ABS PRELIM: 5.44 X10 (3) UL (ref 1.3–6.7)
NEUTROPHILS # BLD AUTO: 5.44 X10(3) UL (ref 1.3–6.7)
NEUTROPHILS NFR BLD AUTO: 71.9 %
PLATELET # BLD AUTO: 138 10(3)UL (ref 150–450)
POTASSIUM SERPL-SCNC: 4.4 MMOL/L (ref 3.6–5.1)
RBC # BLD AUTO: 4 X10(6)UL (ref 3.8–5.8)
RED CELL DISTRIBUTION WIDTH-SD: 42.3 FL (ref 35.1–46.3)
SODIUM SERPL-SCNC: 142 MMOL/L (ref 136–144)
WBC # BLD AUTO: 7.6 X10(3) UL (ref 4–13)

## 2017-02-10 PROCEDURE — 80048 BASIC METABOLIC PNL TOTAL CA: CPT

## 2017-02-10 PROCEDURE — 85025 COMPLETE CBC W/AUTO DIFF WBC: CPT

## 2017-02-13 ENCOUNTER — OFFICE VISIT (OUTPATIENT)
Dept: FAMILY MEDICINE CLINIC | Facility: CLINIC | Age: 67
End: 2017-02-13

## 2017-02-13 VITALS
SYSTOLIC BLOOD PRESSURE: 168 MMHG | HEART RATE: 78 BPM | OXYGEN SATURATION: 99 % | HEIGHT: 71 IN | DIASTOLIC BLOOD PRESSURE: 74 MMHG | WEIGHT: 198.25 LBS | TEMPERATURE: 99 F | BODY MASS INDEX: 27.75 KG/M2

## 2017-02-13 DIAGNOSIS — E11.42 TYPE 2 DIABETES MELLITUS WITH DIABETIC POLYNEUROPATHY, WITHOUT LONG-TERM CURRENT USE OF INSULIN (HCC): ICD-10-CM

## 2017-02-13 DIAGNOSIS — Z95.2 S/P TAVR (TRANSCATHETER AORTIC VALVE REPLACEMENT): ICD-10-CM

## 2017-02-13 DIAGNOSIS — N18.30 CHRONIC KIDNEY DISEASE, STAGE 3, MOD DECREASED GFR (HCC): ICD-10-CM

## 2017-02-13 DIAGNOSIS — I35.0 AORTIC VALVE STENOSIS, UNSPECIFIED ETIOLOGY: Primary | ICD-10-CM

## 2017-02-13 DIAGNOSIS — E11.40 CHRONIC PAINFUL DIABETIC NEUROPATHY (HCC): ICD-10-CM

## 2017-02-13 PROCEDURE — 99495 TRANSJ CARE MGMT MOD F2F 14D: CPT | Performed by: FAMILY MEDICINE

## 2017-02-13 RX ORDER — PREGABALIN 100 MG/1
100 CAPSULE ORAL 3 TIMES DAILY
Qty: 90 CAPSULE | Refills: 2 | Status: SHIPPED | OUTPATIENT
Start: 2017-02-13 | End: 2017-02-23

## 2017-02-13 NOTE — PROGRESS NOTES
HPI:    Sammy Prescott is a 77year old male here today for hospital follow up.    He was discharged from Inpatient hospital, [facility] to Home   Admission Date: 2/1/17   Discharge Date: 2/4/17  Hospital Discharge Diagnosis: aortic stenosis, s/p T Take 1 tablet (5 mg total) by mouth daily. Atorvastatin Calcium 10 MG Oral Tab Take 1 tablet (10 mg total) by mouth nightly.    Pantoprazole Sodium (PROTONIX) 40 MG Oral Tab EC Take 1 tablet (40 mg total) by mouth every morning before breakfast.   Omega-3 lesions  EYES: denies blurred vision or double vision  HEENT: denies nasal congestion, sinus pain or ST  LUNGS: denies shortness of breath with exertion   CARDIOVASCULAR: denies chest pain on exertion or palpitations, no lightheadedness   GI: denies abdomi pregabalin 100 MG Oral Cap; Take 1 capsule (100 mg total) by mouth 3 (three) times daily. Chronic kidney disease, stage 3, mod decreased GFR  -     Nephrology Referral - In Network    - referral for nephrology placed.    - appts for cardiology and nephr

## 2017-02-14 DIAGNOSIS — I10 ESSENTIAL HYPERTENSION: ICD-10-CM

## 2017-02-14 RX ORDER — METOPROLOL TARTRATE 50 MG/1
50 TABLET, FILM COATED ORAL 2 TIMES DAILY
Qty: 180 TABLET | Refills: 0 | Status: SHIPPED | OUTPATIENT
Start: 2017-02-14 | End: 2017-05-11

## 2017-02-14 NOTE — TELEPHONE ENCOUNTER
From: Meghna Holland  To: Lucas Cohen DO  Sent: 2/14/2017 10:05 AM CST  Subject: Medication Renewal Request    Original authorizing provider: DO Meghna Shell would like a refill of the following medications:  Metoprolol Tartrate

## 2017-02-15 ENCOUNTER — PRIOR ORIGINAL RECORDS (OUTPATIENT)
Dept: OTHER | Age: 67
End: 2017-02-15

## 2017-02-15 ENCOUNTER — MYAURORA ACCOUNT LINK (OUTPATIENT)
Dept: OTHER | Age: 67
End: 2017-02-15

## 2017-02-22 ENCOUNTER — PATIENT MESSAGE (OUTPATIENT)
Dept: FAMILY MEDICINE CLINIC | Facility: CLINIC | Age: 67
End: 2017-02-22

## 2017-02-22 DIAGNOSIS — E11.40 CHRONIC PAINFUL DIABETIC NEUROPATHY (HCC): ICD-10-CM

## 2017-02-22 DIAGNOSIS — E11.42 TYPE 2 DIABETES MELLITUS WITH DIABETIC POLYNEUROPATHY, WITHOUT LONG-TERM CURRENT USE OF INSULIN (HCC): Primary | ICD-10-CM

## 2017-02-22 LAB
ALBUMIN: 3.1 G/DL
ALKALINE PHOSPHATATE(ALK PHOS): 89 IU/L
BILIRUBIN TOTAL: 0.8 MG/DL
BUN: 21 MG/DL
BUN: 22 MG/DL
BUN: 25 MG/DL
CALCIUM: 8.5 MG/DL
CALCIUM: 9.2 MG/DL
CALCIUM: 9.5 MG/DL
CHLORIDE: 105 MEQ/L
CHLORIDE: 109 MEQ/L
CHLORIDE: 111 MEQ/L
CHOLESTEROL, TOTAL: 156 MG/DL
CREATININE, SERUM: 1.86 MG/DL
CREATININE, SERUM: 2 MG/DL
CREATININE, SERUM: 2.49 MG/DL
GLUCOSE: 131 MG/DL
GLUCOSE: 160 MG/DL
GLUCOSE: 241 MG/DL
HDL CHOLESTEROL: 39 MG/DL
HEMATOCRIT: 34.3 %
HEMATOCRIT: 44.6 %
HEMOGLOBIN A1C: 6.6 %
HEMOGLOBIN: 11.4 G/DL
HEMOGLOBIN: 15.5 G/DL
PLATELETS: 118 K/UL
PLATELETS: 138 K/UL
POTASSIUM, SERUM: 3.9 MEQ/L
POTASSIUM, SERUM: 4.4 MEQ/L
POTASSIUM, SERUM: 4.4 MEQ/L
PROTEIN, TOTAL: 6.6 G/DL
RED BLOOD COUNT: 4 X 10-6/U
RED BLOOD COUNT: 5.34 X 10-6/U
SGOT (AST): 18 IU/L
SGPT (ALT): 19 IU/L
SODIUM: 139 MEQ/L
SODIUM: 142 MEQ/L
SODIUM: 142 MEQ/L
TRIGLYCERIDES: 663 MG/DL
WHITE BLOOD COUNT: 6.7 X 10-3/U
WHITE BLOOD COUNT: 7.6 X 10-3/U

## 2017-02-22 NOTE — TELEPHONE ENCOUNTER
From: Marcio Yip  To: Radha El DO  Sent: 2/22/2017 2:50 PM CST  Subject: Prescription Question    Dear Dr. Marcus Butler,  I finally heard back from [facility].  They said that it's taking them about 6 weeks to process prescriptions, but if you fax the p

## 2017-02-23 RX ORDER — PREGABALIN 100 MG/1
100 CAPSULE ORAL 3 TIMES DAILY
Qty: 90 CAPSULE | Refills: 2 | Status: SHIPPED
Start: 2017-02-23 | End: 2017-05-11

## 2017-02-23 NOTE — TELEPHONE ENCOUNTER
New script printed. Please fax to number that [facility] indicated. Thanks.

## 2017-03-02 ENCOUNTER — OFFICE VISIT (OUTPATIENT)
Dept: NEPHROLOGY | Facility: CLINIC | Age: 67
End: 2017-03-02

## 2017-03-02 VITALS — DIASTOLIC BLOOD PRESSURE: 78 MMHG | BODY MASS INDEX: 27 KG/M2 | WEIGHT: 192 LBS | SYSTOLIC BLOOD PRESSURE: 144 MMHG

## 2017-03-02 DIAGNOSIS — I10 ESSENTIAL HYPERTENSION: ICD-10-CM

## 2017-03-02 DIAGNOSIS — R73.9 HYPERGLYCEMIA: ICD-10-CM

## 2017-03-02 DIAGNOSIS — R60.9 EDEMA, UNSPECIFIED TYPE: ICD-10-CM

## 2017-03-02 DIAGNOSIS — N18.3 CKD (CHRONIC KIDNEY DISEASE), STAGE 3 (MODERATE): Primary | ICD-10-CM

## 2017-03-02 PROCEDURE — 99214 OFFICE O/P EST MOD 30 MIN: CPT | Performed by: INTERNAL MEDICINE

## 2017-03-02 NOTE — PROGRESS NOTES
Nephrology Progress Note      ASSESSMENT/PLAN:        1) CKD 3- baseline Cr approximately 2.0 mg/dL is due to hypertensive nephrosclerosis, diabetic nephropathy, chronic NSAID use and history of right-sided urinary obstruction due to complete ureteral comp kidney/abd area- colonoscopy scheduled 04/01/16   • Arrhythmia      afib   • Valvular disease      aortic valve   • Visual impairment    • Neuropathy ([email or web address]      bilat hands,legs,feet   • Stroke [facility])      2004- no lasting effects that pt thinks   • Cancer Oral Cap Take 50 mg by mouth nightly. Disp:  Rfl:    pregabalin 100 MG Oral Cap Take 1 capsule (100 mg total) by mouth 3 (three) times daily.  Disp: 90 capsule Rfl: 2   Metoprolol Tartrate 50 MG Oral Tab Take 1 tablet (50 mg total) by mouth 2 (two) times da edema.   Neurologic: Alert and oriented, normal affect, cranial nerves grossly intact, moving all extremities  Skin: Warm and dry, no rashes      Domingo Bagley MD  3/2/2016  136 PM

## 2017-03-08 ENCOUNTER — HOSPITAL ENCOUNTER (OUTPATIENT)
Dept: CARDIOLOGY CLINIC | Facility: HOSPITAL | Age: 67
Discharge: HOME | End: 2017-03-08
Attending: NURSE PRACTITIONER
Payer: MEDICARE

## 2017-03-08 ENCOUNTER — HOSPITAL ENCOUNTER (OUTPATIENT)
Dept: CV DIAGNOSTICS | Facility: HOSPITAL | Age: 67
Discharge: HOME | End: 2017-03-08
Attending: THORACIC SURGERY (CARDIOTHORACIC VASCULAR SURGERY)
Payer: MEDICARE

## 2017-03-08 ENCOUNTER — TELEPHONE (OUTPATIENT)
Dept: CARDIOLOGY CLINIC | Facility: HOSPITAL | Age: 67
End: 2017-03-08

## 2017-03-08 VITALS
TEMPERATURE: 97 F | SYSTOLIC BLOOD PRESSURE: 150 MMHG | HEART RATE: 64 BPM | OXYGEN SATURATION: 100 % | RESPIRATION RATE: 18 BRPM | DIASTOLIC BLOOD PRESSURE: 68 MMHG

## 2017-03-08 DIAGNOSIS — I35.0 AORTIC STENOSIS: ICD-10-CM

## 2017-03-08 PROCEDURE — 93306 TTE W/DOPPLER COMPLETE: CPT

## 2017-03-08 PROCEDURE — 93306 TTE W/DOPPLER COMPLETE: CPT | Performed by: INTERNAL MEDICINE

## 2017-03-08 PROCEDURE — 99213 OFFICE O/P EST LOW 20 MIN: CPT

## 2017-03-08 NOTE — PROGRESS NOTES
[facility]  Valve Clinic Note    Subjective:   Patient presents for 1-month post TAVR visit accompanied by wife. He underwent a percutaneous RTF TAVR with 26mm S3 valve on 2/2/17. He is doing very well.  States that he has been walking without cane for mildly     thickened leaflets. 4. Left atrium: The left atrium was mildly to moderately dilated. The left     atrial volume was mildly to moderately increased. 5. Pulmonary arteries: Systolic pressure was mildly increased, estimated to     be 37mm Hg.   I 3/2016  7. Hx GI bleeds  8. PVD (peripheral vascular disease)  9. H/o Osteomyelitis with MRSA of left great toe s/p amputation  10. H/o carotid stenosis with R CEA 2005  11. H/o CVA 2004 - no residual deficits  12.  Hypertension - on metoprolol, ACEI held b

## 2017-03-08 NOTE — PROGRESS NOTES
Left VM with results of echo today: valve functioning normally, EF 66%, PAS 30-35 mmHg. In general, there has been no interval changes compared to previous echo. He will F/U in TAVR clinic in 5 months with echo following.

## 2017-03-13 NOTE — OPERATIVE REPORT
[facility]    PATIENT'S NAME: Carleen Nick   ATTENDING PHYSICIAN: Spencer Denton M.D. OPERATING PHYSICIAN: Spencer Denton M.D.    PATIENT ACCOUNT#:   [de-identified]    LOCATION:  [address] [place] [address]  MEDICAL RECORD #:   [id number]       DATE OF BIRTH:

## 2017-03-29 RX ORDER — PANTOPRAZOLE SODIUM 40 MG/1
40 TABLET, DELAYED RELEASE ORAL
Qty: 90 TABLET | Refills: 3 | Status: SHIPPED | OUTPATIENT
Start: 2017-03-29 | End: 2018-03-27

## 2017-03-29 NOTE — TELEPHONE ENCOUNTER
Last refill: 12- #90 with 0 refills    Last Visit: 2-    Next Visit: None scheduled    Forward to Dr. Hubert Bill please advise on refills. Thanks.

## 2017-03-29 NOTE — TELEPHONE ENCOUNTER
From: Rosario Cameron  To: Maryan Rm DO  Sent: 3/29/2017 2:10 PM CDT  Subject: Medication Renewal Request    Original authorizing provider: DO Rosario Guerra Caro would like a refill of the following medications:  Pantoprazole Sodium

## 2017-04-03 ENCOUNTER — LAB ENCOUNTER (OUTPATIENT)
Dept: LAB | Age: 67
End: 2017-04-03
Attending: INTERNAL MEDICINE
Payer: MEDICARE

## 2017-04-03 ENCOUNTER — PRIOR ORIGINAL RECORDS (OUTPATIENT)
Dept: OTHER | Age: 67
End: 2017-04-03

## 2017-04-03 DIAGNOSIS — E11.9 DIABETES (HCC): ICD-10-CM

## 2017-04-03 DIAGNOSIS — E78.1 PURE HYPERGLYCERIDEMIA: Primary | ICD-10-CM

## 2017-04-03 PROCEDURE — 80061 LIPID PANEL: CPT

## 2017-04-03 PROCEDURE — 80053 COMPREHEN METABOLIC PANEL: CPT

## 2017-04-03 PROCEDURE — 36415 COLL VENOUS BLD VENIPUNCTURE: CPT

## 2017-04-03 PROCEDURE — 83880 ASSAY OF NATRIURETIC PEPTIDE: CPT

## 2017-04-03 PROCEDURE — 85025 COMPLETE CBC W/AUTO DIFF WBC: CPT

## 2017-04-06 ENCOUNTER — OFFICE VISIT (OUTPATIENT)
Dept: NEPHROLOGY | Facility: CLINIC | Age: 67
End: 2017-04-06

## 2017-04-06 VITALS — SYSTOLIC BLOOD PRESSURE: 166 MMHG | WEIGHT: 197 LBS | DIASTOLIC BLOOD PRESSURE: 88 MMHG | BODY MASS INDEX: 27 KG/M2

## 2017-04-06 DIAGNOSIS — N18.3 CKD (CHRONIC KIDNEY DISEASE), STAGE 3 (MODERATE): ICD-10-CM

## 2017-04-06 DIAGNOSIS — E11.8 TYPE 2 DIABETES MELLITUS WITH COMPLICATION, WITHOUT LONG-TERM CURRENT USE OF INSULIN (HCC): ICD-10-CM

## 2017-04-06 DIAGNOSIS — I10 ESSENTIAL HYPERTENSION: Primary | ICD-10-CM

## 2017-04-06 PROCEDURE — 99213 OFFICE O/P EST LOW 20 MIN: CPT | Performed by: INTERNAL MEDICINE

## 2017-04-06 RX ORDER — AMLODIPINE BESYLATE 10 MG/1
10 TABLET ORAL DAILY
Qty: 30 TABLET | Refills: 11 | Status: SHIPPED | OUTPATIENT
Start: 2017-04-06 | End: 2018-03-20

## 2017-04-06 NOTE — PROGRESS NOTES
Nephrology Progress Note      ASSESSMENT/PLAN:        1) CKD 3- baseline Cr approximately 2.0 mg/dL is due to hypertensive nephrosclerosis, diabetic nephropathy, chronic NSAID use and history of right-sided urinary obstruction due to complete ureteral comp • Arrhythmia      afib   • Valvular disease      aortic valve   • Visual impairment    • Neuropathy ([facility] 75[facility]      bilat hands,legs,feet   • Stroke [facility])      2004- no lasting effects that pt thinks   • Cancer ([facility] 2015     colon CA   • Type 2 diabetes shayy before breakfast. Disp: 90 tablet Rfl: 3   DiphenhydrAMINE HCl, Sleep, (UNISOM SLEEPGELS) 50 MG Oral Cap Take 50 mg by mouth nightly. Disp:  Rfl:    pregabalin 100 MG Oral Cap Take 1 capsule (100 mg total) by mouth 3 (three) times daily.  Disp: 90 capsule R affect, cranial nerves grossly intact, moving all extremities  Skin: Warm and dry, no rashes      Hsien-Ta Tracie Babinski, MD  3/2/2016  136 PM

## 2017-04-11 LAB
ALBUMIN: 3.9 G/DL
ALKALINE PHOSPHATATE(ALK PHOS): 80 IU/L
BILIRUBIN TOTAL: 0.6 MG/DL
BNP: 70 PMOL/L
BUN: 30 MG/DL
CALCIUM: 9.8 MG/DL
CHLORIDE: 108 MEQ/L
CHOLESTEROL, TOTAL: 107 MG/DL
CREATININE, SERUM: 2.55 MG/DL
GLUCOSE: 130 MG/DL
HDL CHOLESTEROL: 40 MG/DL
HEMATOCRIT: 42.9 %
HEMOGLOBIN: 14.1 G/DL
LDL CHOLESTEROL: 14 MG/DL
PLATELETS: 105 K/UL
POTASSIUM, SERUM: 4.6 MEQ/L
PROTEIN, TOTAL: 7.8 G/DL
RED BLOOD COUNT: 4.92 X 10-6/U
SGOT (AST): 19 IU/L
SGPT (ALT): 20 IU/L
SODIUM: 141 MEQ/L
TRIGLYCERIDES: 265 MG/DL
WHITE BLOOD COUNT: 6.1 X 10-3/U

## 2017-04-16 DIAGNOSIS — E11.42 TYPE 2 DIABETES MELLITUS WITH DIABETIC POLYNEUROPATHY, WITHOUT LONG-TERM CURRENT USE OF INSULIN (HCC): ICD-10-CM

## 2017-04-17 RX ORDER — GLIPIZIDE 5 MG/1
5 TABLET, FILM COATED, EXTENDED RELEASE ORAL DAILY
Qty: 90 TABLET | Refills: 0 | Status: SHIPPED | OUTPATIENT
Start: 2017-04-17 | End: 2017-07-17

## 2017-04-17 NOTE — TELEPHONE ENCOUNTER
From: Arianna Angulo  To: Katia Londono DO  Sent: 4/16/2017 12:34 PM CDT  Subject: Medication Renewal Request    Original authorizing provider: DO Arianna Gray would like a refill of the following medications:  GlipiZIDE ER 5 MG O

## 2017-04-25 DIAGNOSIS — E78.1 PURE HYPERGLYCERIDEMIA: ICD-10-CM

## 2017-04-25 RX ORDER — ATORVASTATIN CALCIUM 10 MG/1
10 TABLET, FILM COATED ORAL NIGHTLY
Qty: 90 TABLET | Refills: 1 | Status: SHIPPED | OUTPATIENT
Start: 2017-04-25 | End: 2017-10-22

## 2017-04-25 NOTE — TELEPHONE ENCOUNTER
From: Abdullahi Raygoza  To: Arvel Mcardle, DO  Sent: 4/25/2017 9:39 AM CDT  Subject: Medication Renewal Request    Original authorizing provider: DO Abdullahi Peralta would like a refill of the following medications:  Atorvastatin Calcium

## 2017-05-02 ENCOUNTER — PRIOR ORIGINAL RECORDS (OUTPATIENT)
Dept: OTHER | Age: 67
End: 2017-05-02

## 2017-05-04 ENCOUNTER — PRIOR ORIGINAL RECORDS (OUTPATIENT)
Dept: OTHER | Age: 67
End: 2017-05-04

## 2017-05-11 ENCOUNTER — OFFICE VISIT (OUTPATIENT)
Dept: FAMILY MEDICINE CLINIC | Facility: CLINIC | Age: 67
End: 2017-05-11

## 2017-05-11 VITALS
DIASTOLIC BLOOD PRESSURE: 82 MMHG | SYSTOLIC BLOOD PRESSURE: 142 MMHG | TEMPERATURE: 98 F | WEIGHT: 200 LBS | BODY MASS INDEX: 28 KG/M2 | HEART RATE: 68 BPM

## 2017-05-11 DIAGNOSIS — C18.7 CANCER OF SIGMOID COLON (HCC): ICD-10-CM

## 2017-05-11 DIAGNOSIS — E11.42 TYPE 2 DIABETES MELLITUS WITH DIABETIC POLYNEUROPATHY, WITHOUT LONG-TERM CURRENT USE OF INSULIN (HCC): ICD-10-CM

## 2017-05-11 DIAGNOSIS — E11.40 CHRONIC PAINFUL DIABETIC NEUROPATHY (HCC): ICD-10-CM

## 2017-05-11 DIAGNOSIS — I25.10 CORONARY ARTERY DISEASE INVOLVING NATIVE CORONARY ARTERY OF NATIVE HEART WITHOUT ANGINA PECTORIS: ICD-10-CM

## 2017-05-11 DIAGNOSIS — I10 ESSENTIAL HYPERTENSION: Primary | ICD-10-CM

## 2017-05-11 PROBLEM — D69.6 THROMBOCYTOPENIA (HCC): Chronic | Status: ACTIVE | Noted: 2017-05-11

## 2017-05-11 PROCEDURE — 99214 OFFICE O/P EST MOD 30 MIN: CPT | Performed by: FAMILY MEDICINE

## 2017-05-11 PROCEDURE — 83036 HEMOGLOBIN GLYCOSYLATED A1C: CPT | Performed by: FAMILY MEDICINE

## 2017-05-11 RX ORDER — METOPROLOL TARTRATE 50 MG/1
50 TABLET, FILM COATED ORAL 2 TIMES DAILY
Qty: 180 TABLET | Refills: 1 | Status: SHIPPED | OUTPATIENT
Start: 2017-05-11 | End: 2017-11-13

## 2017-05-11 RX ORDER — PREGABALIN 100 MG/1
100 CAPSULE ORAL 3 TIMES DAILY
Qty: 90 CAPSULE | Refills: 5 | Status: SHIPPED
Start: 2017-05-11 | End: 2017-11-07

## 2017-05-11 NOTE — PROGRESS NOTES
HPI:   Carolina Dixon is a 79year old male who presents for recheck of his diabetes. Patient’s FBS have been 100. Highest was 140. Last visit with ophthalmologist was 2 months ago at [facility]. Pt has been checking his feet on a regular basis.  Pt denies 01/20/2017   TRIG 154* 04/05/2016       Lab Results  Component Value Date   AST 19 04/03/2017   AST 18 02/02/2017   AST 19 02/01/2017       Lab Results  Component Value Date   ALT 20 04/03/2017   ALT 19 02/02/2017   ALT 25 02/01/2017       MALB/CRE CALC kidney/abd area- colonoscopy scheduled 04/01/16   • Arrhythmia      afib   • Valvular disease      aortic valve   • Visual impairment    • [facility] ([facility]      bilat hands,legs,feet   • Stroke [facility])      2004- no lasting effects that pt thinks   • Cancer heartburn  NEURO: denies headaches    EXAM:   /82 mmHg  Pulse 68  Temp(Src) 97.9 °F (36.6 °C) (Temporal)  Wt 200 lb  GENERAL: well developed, well nourished,in no apparent distress  SKIN: no rashes,no suspicious lesions  NECK: supple,no adenopathy Refills for this Visit:  Signed Prescriptions Disp Refills    Metoprolol Tartrate 50 MG Oral Tab 180 tablet 1      Sig: Take 1 tablet (50 mg total) by mouth 2 (two) times daily.       pregabalin 100 MG Oral Cap 90 capsule 5      Sig: Take 1 capsule (100 mg

## 2017-05-12 ENCOUNTER — PATIENT MESSAGE (OUTPATIENT)
Dept: FAMILY MEDICINE CLINIC | Facility: CLINIC | Age: 67
End: 2017-05-12

## 2017-05-12 ENCOUNTER — TELEPHONE (OUTPATIENT)
Dept: HEMATOLOGY/ONCOLOGY | Facility: HOSPITAL | Age: 67
End: 2017-05-12

## 2017-05-12 ENCOUNTER — APPOINTMENT (OUTPATIENT)
Dept: LAB | Age: 67
End: 2017-05-12
Attending: FAMILY MEDICINE
Payer: MEDICARE

## 2017-05-12 DIAGNOSIS — E11.42 TYPE 2 DIABETES MELLITUS WITH DIABETIC POLYNEUROPATHY, WITHOUT LONG-TERM CURRENT USE OF INSULIN (HCC): ICD-10-CM

## 2017-05-12 DIAGNOSIS — C18.7 CANCER OF SIGMOID COLON (HCC): Primary | ICD-10-CM

## 2017-05-12 PROCEDURE — 82570 ASSAY OF URINE CREATININE: CPT

## 2017-05-12 PROCEDURE — 82043 UR ALBUMIN QUANTITATIVE: CPT

## 2017-05-12 NOTE — TELEPHONE ENCOUNTER
From: Shilo Novak  To: Chantal Pepper DO  Sent: 5/12/2017 3:10 PM CDT  Subject: Non-Urgent Medical Question    Hi Dr. Eddy Public: We have an appointment with Dr. Pugh [facility] oncologist) on May 25th.  Dr. Fabio Navarro nurse just left me a message that

## 2017-05-25 ENCOUNTER — OFFICE VISIT (OUTPATIENT)
Dept: HEMATOLOGY/ONCOLOGY | Facility: HOSPITAL | Age: 67
End: 2017-05-25
Attending: INTERNAL MEDICINE
Payer: MEDICARE

## 2017-05-25 VITALS
HEART RATE: 65 BPM | TEMPERATURE: 96 F | RESPIRATION RATE: 18 BRPM | OXYGEN SATURATION: 96 % | HEIGHT: 70.98 IN | WEIGHT: 200.81 LBS | DIASTOLIC BLOOD PRESSURE: 79 MMHG | SYSTOLIC BLOOD PRESSURE: 166 MMHG | BODY MASS INDEX: 28.11 KG/M2

## 2017-05-25 DIAGNOSIS — C18.7 CANCER OF SIGMOID COLON (HCC): Primary | ICD-10-CM

## 2017-05-25 DIAGNOSIS — R90.89 MAGNETIC RESONANCE IMAGING OF BRAIN ABNORMAL: ICD-10-CM

## 2017-05-25 DIAGNOSIS — R91.1 LUNG NODULE: ICD-10-CM

## 2017-05-25 PROCEDURE — 99215 OFFICE O/P EST HI 40 MIN: CPT | Performed by: INTERNAL MEDICINE

## 2017-05-25 NOTE — PROGRESS NOTES
[facility] Progress Note    Patient Name: Adam Randall   YOB: 1950   Medical Record Number: [id number]   CSN: [id number]   Attending Physician: Leslie Palacio M.D.    Referring Physician: Ramos Torrez DO      Date of Visit: 5/25/2017 severe AS on 2/2/17. He feels he has good energy. Appetite is good. Current Medications:    Current outpatient prescriptions:   •  Metoprolol Tartrate 50 MG Oral Tab, Take 1 tablet (50 mg total) by mouth 2 (two) times daily. , Disp: 180 tablet, Rfl: 1 • Cancer ([facility] 2015     colon CA   • Type 2 diabetes mellitus (HCC)      Dx at age 61   • Hypertension, essential, benign    • Hyperlipidemia LDL goal <70    • Colon cancer ([facility] 75.)      Dx in 2015: tx with colon resection   • Aortic stenosis    • CKD ( 14-point ROS was done with pertinent positives and negative per the HPI    Vital Signs:  /79 mmHg  Pulse 65  Temp(Src) 96 °F (35.6 °C) (Tympanic)  Resp 18  Ht 1.803 m (5' 10.98\")  Wt 91.082 kg (200 lb 12.8 oz)  BMI 28.02 kg/m2  SpO2 96%      Physica techniques were used.      FINDINGS:         LUNGS:  There are few scattered calcified granulomas within the lungs. No acute airspace disease. No suspicious pulmonary nodules. The larger airways are unremarkable. MEDIASTINUM:  No evidence of adenopathy. improving post treatment/surgical change.              Dictated by: Rubens Brar MD on 6/23/2016 at 10:38        Approved by: Rubens Brar MD            Impression and Plan:  I last saw Santi Treviño a year ago. He was supposed to f/u in 3 months but never did.  He

## 2017-06-05 ENCOUNTER — HOSPITAL ENCOUNTER (OUTPATIENT)
Dept: CT IMAGING | Age: 67
Discharge: HOME | End: 2017-06-05
Attending: INTERNAL MEDICINE
Payer: MEDICARE

## 2017-06-05 DIAGNOSIS — C18.7 CANCER OF SIGMOID COLON (HCC): ICD-10-CM

## 2017-06-05 PROCEDURE — 71250 CT THORAX DX C-: CPT | Performed by: INTERNAL MEDICINE

## 2017-06-05 PROCEDURE — 74176 CT ABD & PELVIS W/O CONTRAST: CPT | Performed by: INTERNAL MEDICINE

## 2017-06-11 ENCOUNTER — HOSPITAL ENCOUNTER (OUTPATIENT)
Dept: MRI IMAGING | Facility: HOSPITAL | Age: 67
Discharge: HOME | End: 2017-06-11
Attending: INTERNAL MEDICINE
Payer: MEDICARE

## 2017-06-11 DIAGNOSIS — C18.7 CANCER OF SIGMOID COLON (HCC): ICD-10-CM

## 2017-06-11 DIAGNOSIS — R90.89 MAGNETIC RESONANCE IMAGING OF BRAIN ABNORMAL: ICD-10-CM

## 2017-06-11 PROCEDURE — 70551 MRI BRAIN STEM W/O DYE: CPT | Performed by: INTERNAL MEDICINE

## 2017-06-16 ENCOUNTER — PRIOR ORIGINAL RECORDS (OUTPATIENT)
Dept: OTHER | Age: 67
End: 2017-06-16

## 2017-07-12 ENCOUNTER — TELEPHONE (OUTPATIENT)
Dept: FAMILY MEDICINE CLINIC | Facility: CLINIC | Age: 67
End: 2017-07-12

## 2017-07-12 ENCOUNTER — APPOINTMENT (OUTPATIENT)
Dept: LAB | Age: 67
End: 2017-07-12
Attending: STUDENT IN AN ORGANIZED HEALTH CARE EDUCATION/TRAINING PROGRAM
Payer: MEDICARE

## 2017-07-12 DIAGNOSIS — Z86.14 HISTORY OF MRSA INFECTION: ICD-10-CM

## 2017-07-12 PROCEDURE — 87147 CULTURE TYPE IMMUNOLOGIC: CPT

## 2017-07-12 PROCEDURE — 87081 CULTURE SCREEN ONLY: CPT

## 2017-07-12 NOTE — TELEPHONE ENCOUNTER
Made appt for 8:45 with nurse on 7/17/17 for EKG and fasting labs. Dr. Marleny Singh, to send order.  Dr. Sunil Ortiz # [phone]

## 2017-07-14 ENCOUNTER — LAB ENCOUNTER (OUTPATIENT)
Dept: LAB | Age: 67
End: 2017-07-14
Attending: STUDENT IN AN ORGANIZED HEALTH CARE EDUCATION/TRAINING PROGRAM
Payer: MEDICARE

## 2017-07-14 DIAGNOSIS — Z86.14 HISTORY OF MRSA INFECTION: Primary | ICD-10-CM

## 2017-07-14 PROCEDURE — 87081 CULTURE SCREEN ONLY: CPT

## 2017-07-17 ENCOUNTER — NURSE ONLY (OUTPATIENT)
Dept: FAMILY MEDICINE CLINIC | Facility: CLINIC | Age: 67
End: 2017-07-17

## 2017-07-17 ENCOUNTER — TELEPHONE (OUTPATIENT)
Dept: FAMILY MEDICINE CLINIC | Facility: CLINIC | Age: 67
End: 2017-07-17

## 2017-07-17 ENCOUNTER — LAB ENCOUNTER (OUTPATIENT)
Dept: LAB | Age: 67
End: 2017-07-17
Attending: STUDENT IN AN ORGANIZED HEALTH CARE EDUCATION/TRAINING PROGRAM
Payer: MEDICARE

## 2017-07-17 DIAGNOSIS — E11.42 TYPE 2 DIABETES MELLITUS WITH DIABETIC POLYNEUROPATHY, WITHOUT LONG-TERM CURRENT USE OF INSULIN (HCC): ICD-10-CM

## 2017-07-17 DIAGNOSIS — I35.0 AORTIC VALVE STENOSIS, UNSPECIFIED ETIOLOGY: ICD-10-CM

## 2017-07-17 DIAGNOSIS — A49.02 MRSA INFECTION: Primary | ICD-10-CM

## 2017-07-17 DIAGNOSIS — Z95.2 S/P TAVR (TRANSCATHETER AORTIC VALVE REPLACEMENT): ICD-10-CM

## 2017-07-17 DIAGNOSIS — I25.10 CORONARY ARTERY DISEASE INVOLVING NATIVE CORONARY ARTERY OF NATIVE HEART WITHOUT ANGINA PECTORIS: ICD-10-CM

## 2017-07-17 DIAGNOSIS — I73.9 PVD (PERIPHERAL VASCULAR DISEASE) (HCC): ICD-10-CM

## 2017-07-17 DIAGNOSIS — I35.0 NONRHEUMATIC AORTIC VALVE STENOSIS: ICD-10-CM

## 2017-07-17 DIAGNOSIS — Z01.818 PREOP TESTING: Primary | ICD-10-CM

## 2017-07-17 DIAGNOSIS — Z86.79 HISTORY OF ATRIAL FIBRILLATION: ICD-10-CM

## 2017-07-17 LAB
BUN BLD-MCNC: 28 MG/DL (ref 8–20)
CALCIUM BLD-MCNC: 9.8 MG/DL (ref 8.3–10.3)
CHLORIDE: 108 MMOL/L (ref 101–111)
CO2: 26 MMOL/L (ref 22–32)
CREAT BLD-MCNC: 2.98 MG/DL (ref 0.7–1.3)
GLUCOSE BLD-MCNC: 135 MG/DL (ref 70–99)
POTASSIUM SERPL-SCNC: 4.9 MMOL/L (ref 3.6–5.1)
SODIUM SERPL-SCNC: 141 MMOL/L (ref 136–144)

## 2017-07-17 PROCEDURE — 87147 CULTURE TYPE IMMUNOLOGIC: CPT

## 2017-07-17 PROCEDURE — 87081 CULTURE SCREEN ONLY: CPT

## 2017-07-17 PROCEDURE — 93000 ELECTROCARDIOGRAM COMPLETE: CPT | Performed by: FAMILY MEDICINE

## 2017-07-17 PROCEDURE — 80048 BASIC METABOLIC PNL TOTAL CA: CPT | Performed by: FAMILY MEDICINE

## 2017-07-17 PROCEDURE — 36415 COLL VENOUS BLD VENIPUNCTURE: CPT

## 2017-07-17 PROCEDURE — 36415 COLL VENOUS BLD VENIPUNCTURE: CPT | Performed by: FAMILY MEDICINE

## 2017-07-17 RX ORDER — GLIPIZIDE 5 MG/1
5 TABLET, FILM COATED, EXTENDED RELEASE ORAL DAILY
Qty: 90 TABLET | Refills: 1 | Status: SHIPPED
Start: 2017-07-17 | End: 2018-01-24

## 2017-07-17 NOTE — PROGRESS NOTES
Patient to clinic for preop testing    Mint tube drawn  ekg performed.     Patient advised will need cardiac clearance for surgery

## 2017-07-17 NOTE — TELEPHONE ENCOUNTER
Last OV 5/11/17, Future Appointments  Date Time Provider Alexei Snow   8/9/2017 8:15 AM [facility] CARD ECHO RM 1 ECARD ECHO Arthuro Artist   8/9/2017 9:15 AM HEART FAILURE APN 1 EH HF CLIN Arthuro Artist       Last rx given 4/17/17

## 2017-07-17 NOTE — TELEPHONE ENCOUNTER
Patient to clinic for preop labs and ekg. EKG read by Dr Modesto Goode who states patient needs cardiac clearance.   Copy of EKG report faxed to Dr Sridevi Wood at [phone]

## 2017-07-17 NOTE — TELEPHONE ENCOUNTER
From: Johnny Mcghee  Sent: 7/17/2017 7:50 AM CDT  Subject: Medication Renewal Request    Johnny Mcghee would like a refill of the following medications:  GlipiZIDE ER 5 MG Oral Tablet 24 Hr CÉSAR Alexander    Preferred pharmacy: [facility]

## 2017-07-19 ENCOUNTER — PRIOR ORIGINAL RECORDS (OUTPATIENT)
Dept: OTHER | Age: 67
End: 2017-07-19

## 2017-07-20 ENCOUNTER — OFFICE VISIT (OUTPATIENT)
Dept: FAMILY MEDICINE CLINIC | Facility: CLINIC | Age: 67
End: 2017-07-20

## 2017-07-20 ENCOUNTER — TELEPHONE (OUTPATIENT)
Dept: FAMILY MEDICINE CLINIC | Facility: CLINIC | Age: 67
End: 2017-07-20

## 2017-07-20 VITALS
BODY MASS INDEX: 28 KG/M2 | RESPIRATION RATE: 16 BRPM | HEART RATE: 64 BPM | WEIGHT: 206 LBS | TEMPERATURE: 98 F | SYSTOLIC BLOOD PRESSURE: 150 MMHG | DIASTOLIC BLOOD PRESSURE: 80 MMHG

## 2017-07-20 DIAGNOSIS — Z22.322 MRSA CARRIER: Primary | ICD-10-CM

## 2017-07-20 PROCEDURE — 99213 OFFICE O/P EST LOW 20 MIN: CPT | Performed by: FAMILY MEDICINE

## 2017-07-20 NOTE — TELEPHONE ENCOUNTER
Patient at clinic for appt with Dr Luis Alfredo Cifuentes due to positive MRSA nares. Patient colonoscopy was cancelled per surgeon due to MRSA.  Spoke with Jodie Armando at Dr Jeffry Nickerson office who states per Dr Jeffry Oro Sa, MA, patient will need treatment and  3 negative cultures p

## 2017-07-27 ENCOUNTER — TELEPHONE (OUTPATIENT)
Dept: FAMILY MEDICINE CLINIC | Facility: CLINIC | Age: 67
End: 2017-07-27

## 2017-07-27 NOTE — TELEPHONE ENCOUNTER
[facility] called they need the pre op testing that was done on the patient on 7/17/2017 faxed to them at [phone]

## 2017-07-31 ENCOUNTER — TELEPHONE (OUTPATIENT)
Dept: FAMILY MEDICINE CLINIC | Facility: CLINIC | Age: 67
End: 2017-07-31

## 2017-07-31 ENCOUNTER — APPOINTMENT (OUTPATIENT)
Dept: LAB | Age: 67
End: 2017-07-31
Attending: FAMILY MEDICINE
Payer: MEDICARE

## 2017-07-31 DIAGNOSIS — Z22.322 MRSA CARRIER: ICD-10-CM

## 2017-07-31 PROCEDURE — 87081 CULTURE SCREEN ONLY: CPT

## 2017-08-01 ENCOUNTER — APPOINTMENT (OUTPATIENT)
Dept: LAB | Age: 67
End: 2017-08-01
Attending: FAMILY MEDICINE
Payer: MEDICARE

## 2017-08-01 DIAGNOSIS — Z22.322 MRSA CARRIER: Primary | ICD-10-CM

## 2017-08-01 DIAGNOSIS — Z22.322 MRSA CARRIER: ICD-10-CM

## 2017-08-01 PROCEDURE — 87081 CULTURE SCREEN ONLY: CPT

## 2017-08-02 ENCOUNTER — APPOINTMENT (OUTPATIENT)
Dept: LAB | Age: 67
End: 2017-08-02
Attending: FAMILY MEDICINE
Payer: MEDICARE

## 2017-08-02 DIAGNOSIS — Z22.322 MRSA CARRIER: ICD-10-CM

## 2017-08-02 PROCEDURE — 87081 CULTURE SCREEN ONLY: CPT

## 2017-08-08 NOTE — PROGRESS NOTES
[facility]  TAVR Clinic Note    Subjective:   Patient presents for routine 6 month postop TAVR visit,  accompanied by wife. He underwent a percutaneous RTF TAVR with 26mm S3 valve on 2/2/17.   HE is ambulating with a cane today, and states he is doing The left atrium was mildly to moderately dilated. The left     atrial volume was mildly to moderately increased.   Impressions:  This study is compared with previous dated 02/03/2017:  Compared to the prior study, there has been no significant interval patel Afib 3/2016 converted to SR with IV cardizem  6. Cancer of sigmoid colon s/p colectomy 3/2016  7. Hx GI bleeds - none on Plavix/Asa  8. PVD (peripheral vascular disease)  9. H/o Osteomyelitis with MRSA of left great toe s/p amputation  10.  H/o carotid sten

## 2017-08-09 ENCOUNTER — HOSPITAL ENCOUNTER (OUTPATIENT)
Dept: CARDIOLOGY CLINIC | Facility: HOSPITAL | Age: 67
Discharge: HOME | End: 2017-08-09
Attending: NURSE PRACTITIONER
Payer: MEDICARE

## 2017-08-09 ENCOUNTER — HOSPITAL ENCOUNTER (OUTPATIENT)
Dept: CV DIAGNOSTICS | Facility: HOSPITAL | Age: 67
Discharge: HOME | End: 2017-08-09
Attending: THORACIC SURGERY (CARDIOTHORACIC VASCULAR SURGERY)
Payer: MEDICARE

## 2017-08-09 VITALS
SYSTOLIC BLOOD PRESSURE: 157 MMHG | OXYGEN SATURATION: 100 % | RESPIRATION RATE: 18 BRPM | DIASTOLIC BLOOD PRESSURE: 73 MMHG | TEMPERATURE: 98 F | HEART RATE: 63 BPM

## 2017-08-09 DIAGNOSIS — I35.0 AORTIC STENOSIS: ICD-10-CM

## 2017-08-09 LAB
BUN BLD-MCNC: 32 MG/DL (ref 8–20)
CALCIUM BLD-MCNC: 9.5 MG/DL (ref 8.3–10.3)
CHLORIDE: 108 MMOL/L (ref 101–111)
CO2: 25 MMOL/L (ref 22–32)
CREAT BLD-MCNC: 2.77 MG/DL (ref 0.7–1.3)
GLUCOSE BLD-MCNC: 150 MG/DL (ref 70–99)
POTASSIUM SERPL-SCNC: 4.7 MMOL/L (ref 3.6–5.1)
SODIUM SERPL-SCNC: 139 MMOL/L (ref 136–144)

## 2017-08-09 PROCEDURE — 36415 COLL VENOUS BLD VENIPUNCTURE: CPT | Performed by: NURSE PRACTITIONER

## 2017-08-09 PROCEDURE — 93306 TTE W/DOPPLER COMPLETE: CPT | Performed by: THORACIC SURGERY (CARDIOTHORACIC VASCULAR SURGERY)

## 2017-08-09 PROCEDURE — 80048 BASIC METABOLIC PNL TOTAL CA: CPT | Performed by: NURSE PRACTITIONER

## 2017-08-09 PROCEDURE — 99214 OFFICE O/P EST MOD 30 MIN: CPT | Performed by: NURSE PRACTITIONER

## 2017-08-10 ENCOUNTER — TELEPHONE (OUTPATIENT)
Dept: CARDIOLOGY CLINIC | Facility: HOSPITAL | Age: 67
End: 2017-08-10

## 2017-08-10 NOTE — PROGRESS NOTES
Called patient with results of echo yesterday. Patient's EF is 70%, aortic valve is functioning normally with no rocking motion. Mean gradient is 14mmHg, trivial regurgitation.   Overall, there has been no significant interval changes compared to previous e

## 2017-08-16 ENCOUNTER — TELEPHONE (OUTPATIENT)
Dept: FAMILY MEDICINE CLINIC | Facility: CLINIC | Age: 67
End: 2017-08-16

## 2017-08-16 NOTE — TELEPHONE ENCOUNTER
It looks like the pt had different insurance when the order was placed and the order never dropped to the new insurance- will need to call [facility] in the AM and have them process the order

## 2017-08-16 NOTE — TELEPHONE ENCOUNTER
Looking for referral for Echo that was done on August 9th.  Insurance says this needed to be preauthorized

## 2017-08-17 NOTE — TELEPHONE ENCOUNTER
Spoke with [place] at [facility] who authorized referral.  States referral number is [id number]    Obey Section at [facility] notified via voicemail

## 2017-08-24 ENCOUNTER — TELEPHONE (OUTPATIENT)
Dept: FAMILY MEDICINE CLINIC | Facility: CLINIC | Age: 67
End: 2017-08-24

## 2017-08-24 NOTE — TELEPHONE ENCOUNTER
Patient wife Jm Phelps notified. States she has already spoken with Dr Ani Butler office who told her it is not necessary to repeat culture.       Called and spoke with mJ Phelps at [facility] department who states patient will need to repeat cultures per [facility]

## 2017-08-24 NOTE — OR NURSING
Per Emmanuelle  Infection Control 8/24/17 the 3 MRSA neg swabs do NOT count as patient was on Bactroban. Pt must be re cultured x3. Surgeon and PCP notified as well as patient.

## 2017-08-24 NOTE — TELEPHONE ENCOUNTER
Fax received from [facility]. Patient needs to repeat MRSA swab since patient was treated with Bactroban within 14 days of last negative swab.      Left message on voicemail/answering machine for patient to call office

## 2017-08-28 ENCOUNTER — ANESTHESIA EVENT (OUTPATIENT)
Dept: ENDOSCOPY | Facility: HOSPITAL | Age: 67
End: 2017-08-28

## 2017-08-31 ENCOUNTER — PATIENT MESSAGE (OUTPATIENT)
Dept: FAMILY MEDICINE CLINIC | Facility: CLINIC | Age: 67
End: 2017-08-31

## 2017-08-31 DIAGNOSIS — C18.7 CANCER OF SIGMOID COLON (HCC): ICD-10-CM

## 2017-08-31 DIAGNOSIS — I25.10 CORONARY ARTERY DISEASE INVOLVING NATIVE CORONARY ARTERY OF NATIVE HEART WITHOUT ANGINA PECTORIS: ICD-10-CM

## 2017-08-31 DIAGNOSIS — I35.0 NONRHEUMATIC AORTIC VALVE STENOSIS: Primary | ICD-10-CM

## 2017-08-31 DIAGNOSIS — Z86.79 HISTORY OF ATRIAL FIBRILLATION: ICD-10-CM

## 2017-08-31 DIAGNOSIS — I73.9 PVD (PERIPHERAL VASCULAR DISEASE) (HCC): ICD-10-CM

## 2017-08-31 DIAGNOSIS — I35.0 AORTIC VALVE STENOSIS, ETIOLOGY OF CARDIAC VALVE DISEASE UNSPECIFIED: ICD-10-CM

## 2017-08-31 DIAGNOSIS — Z95.2 S/P TAVR (TRANSCATHETER AORTIC VALVE REPLACEMENT): ICD-10-CM

## 2017-08-31 DIAGNOSIS — N18.30 CHRONIC KIDNEY DISEASE, STAGE 3, MOD DECREASED GFR (HCC): ICD-10-CM

## 2017-08-31 NOTE — TELEPHONE ENCOUNTER
From: Geri Manzanares  To: Stefanie Mistry DO  Sent: 8/31/2017 2:06 PM CDT  Subject: Referral Request    Dear Dr. Jorge A Solano,    My  Mani Hanley) is having a colonoscopy tomorrow, seeing Dr. Monson Right next week on September 6th, and seeing Dr. Noemy Carrillo on

## 2017-09-01 ENCOUNTER — ANESTHESIA (OUTPATIENT)
Dept: ENDOSCOPY | Facility: HOSPITAL | Age: 67
End: 2017-09-01

## 2017-09-01 ENCOUNTER — SURGERY (OUTPATIENT)
Age: 67
End: 2017-09-01

## 2017-09-01 ENCOUNTER — HOSPITAL ENCOUNTER (OUTPATIENT)
Facility: HOSPITAL | Age: 67
Setting detail: HOSPITAL OUTPATIENT SURGERY
Discharge: HOME | End: 2017-09-01
Attending: STUDENT IN AN ORGANIZED HEALTH CARE EDUCATION/TRAINING PROGRAM | Admitting: STUDENT IN AN ORGANIZED HEALTH CARE EDUCATION/TRAINING PROGRAM
Payer: MEDICARE

## 2017-09-01 VITALS
BODY MASS INDEX: 28.44 KG/M2 | HEART RATE: 60 BPM | SYSTOLIC BLOOD PRESSURE: 142 MMHG | DIASTOLIC BLOOD PRESSURE: 83 MMHG | TEMPERATURE: 98 F | OXYGEN SATURATION: 100 % | RESPIRATION RATE: 18 BRPM | WEIGHT: 210 LBS | HEIGHT: 72 IN

## 2017-09-01 LAB — GLUCOSE BLD-MCNC: 143 MG/DL (ref 65–99)

## 2017-09-01 PROCEDURE — 0DBF8ZX EXCISION OF RIGHT LARGE INTESTINE, VIA NATURAL OR ARTIFICIAL OPENING ENDOSCOPIC, DIAGNOSTIC: ICD-10-PCS | Performed by: STUDENT IN AN ORGANIZED HEALTH CARE EDUCATION/TRAINING PROGRAM

## 2017-09-01 PROCEDURE — 88305 TISSUE EXAM BY PATHOLOGIST: CPT | Performed by: STUDENT IN AN ORGANIZED HEALTH CARE EDUCATION/TRAINING PROGRAM

## 2017-09-01 PROCEDURE — 82962 GLUCOSE BLOOD TEST: CPT

## 2017-09-01 PROCEDURE — 0DBK8ZX EXCISION OF ASCENDING COLON, VIA NATURAL OR ARTIFICIAL OPENING ENDOSCOPIC, DIAGNOSTIC: ICD-10-PCS | Performed by: STUDENT IN AN ORGANIZED HEALTH CARE EDUCATION/TRAINING PROGRAM

## 2017-09-01 PROCEDURE — 0DBP8ZX EXCISION OF RECTUM, VIA NATURAL OR ARTIFICIAL OPENING ENDOSCOPIC, DIAGNOSTIC: ICD-10-PCS | Performed by: STUDENT IN AN ORGANIZED HEALTH CARE EDUCATION/TRAINING PROGRAM

## 2017-09-01 RX ORDER — NALOXONE HYDROCHLORIDE 0.4 MG/ML
80 INJECTION, SOLUTION INTRAMUSCULAR; INTRAVENOUS; SUBCUTANEOUS AS NEEDED
Status: DISCONTINUED | OUTPATIENT
Start: 2017-09-01 | End: 2017-09-01

## 2017-09-01 RX ORDER — DEXTROSE MONOHYDRATE 25 G/50ML
50 INJECTION, SOLUTION INTRAVENOUS
Status: DISCONTINUED | OUTPATIENT
Start: 2017-09-01 | End: 2017-09-01

## 2017-09-01 RX ORDER — SODIUM CHLORIDE, SODIUM LACTATE, POTASSIUM CHLORIDE, CALCIUM CHLORIDE 600; 310; 30; 20 MG/100ML; MG/100ML; MG/100ML; MG/100ML
INJECTION, SOLUTION INTRAVENOUS CONTINUOUS
Status: DISCONTINUED | OUTPATIENT
Start: 2017-09-01 | End: 2017-09-01

## 2017-09-01 NOTE — H&P
Gastroenterology H/P  I have personally seen and examined the patient. Patient Name: Rosario Cameron  CC: colon cancer surveillance  HPI: Mr Nabila Díaz is a 80 yo male with a history of colon cancer.   He was diagnosed with Stage IIa adenocarcinoma of the s COLONOSCOPY;  Surgeon: Verena Marks MD;  Location: [address] [place] [address] ENDOSCOPY  4/1/2016: COLONOSCOPY N/A      Comment: Procedure: COLONOSCOPY;  Surgeon: Verena Marks MD;  Location: [address] [place] [address] ENDOSCOPY  No date: GASTRO - INTERNAL Genitourinary:  The patient reports no history of recurrent urinary tract infections, hematuria, dysuria, or nephrolithiasis           Psychiatric: The patient reports no history of depression, anxiety, suicidal ideation, or homicidal ideation cancer surveillance (overdue)      Recommendations:    - Colonoscopy with MAC   - The potentially life-threatening complication of sedation, bleeding, and perforation were reviewed along with the possible need for surgical management, transfusions, or repe

## 2017-09-01 NOTE — ANESTHESIA PREPROCEDURE EVALUATION
PRE-OP EVALUATION    Patient Name: Magali Cantor    Pre-op Diagnosis: HISTORY OF COLON CANCER    Procedure(s):  COLONOSCOPY    Surgeon(s) and Role:     * Garcia White MD - Primary    Pre-op vitals reviewed.   Temp: 97.7 °F (36.5 °C)  Pulse: 64  Re allergies indicates no known allergies. Anesthesia Evaluation    Patient summary reviewed.     Anesthetic Complications  (-) history of anesthetic complications         GI/Hepatic/Renal      (+) GERD       (+) chronic renal disease and CRI      (+) col have angiographic evidence of moderate coronary atherosclerosis.  This is occurring in the setting of preserved LV systolic function and severe aortic stenosis.  There was no evidence of pulmonary artery hypertension.     As described above, patient has hea CO2 25.0 08/09/2017   BUN 32 (H) 08/09/2017   CREATSERUM 2.77 (H) 08/09/2017    (H) 08/09/2017   CA 9.5 08/09/2017            Airway      Mallampati: III  Mouth opening: >3 FB  TM distance: 4 - 6 cm  Neck ROM: full Cardiovascular      Rhythm: regu

## 2017-09-01 NOTE — OPERATIVE REPORT
Colon operative report  Patient Name: Paco Smith  Procedure: Colonoscopy with endoscopic mucosal resection  Indication: Adenocarcinoma of the colon  Attending: Michael Wong M.D.   Consent: The risks, benefits, and alternatives were discussed with th Nodularity and friability at the ileo-colonic anastomosis. This was biopsied with a cold forceps. - 25 mm sessile polyp with nodularity and friable tissue was identified in the ascending colon.   This was injected with saline in four quadrants around the

## 2017-09-01 NOTE — ANESTHESIA POSTPROCEDURE EVALUATION
[address] Patient Status:  Hospital Outpatient Surgery   Age/Gender 79year old male MRN [id number]   Location [address] [place] [address]. Attending Mettu, Cordell Bryant MD   Hosp Day # 0 PCP Paige Palacios DO       Anesthesia Post-op

## 2017-09-06 ENCOUNTER — OFFICE VISIT (OUTPATIENT)
Dept: NEPHROLOGY | Facility: CLINIC | Age: 67
End: 2017-09-06

## 2017-09-06 VITALS — DIASTOLIC BLOOD PRESSURE: 78 MMHG | WEIGHT: 212 LBS | SYSTOLIC BLOOD PRESSURE: 146 MMHG | BODY MASS INDEX: 29 KG/M2

## 2017-09-06 DIAGNOSIS — N18.4 CKD (CHRONIC KIDNEY DISEASE) STAGE 4, GFR 15-29 ML/MIN (HCC): Primary | ICD-10-CM

## 2017-09-06 DIAGNOSIS — N17.9 AKI (ACUTE KIDNEY INJURY) (HCC): ICD-10-CM

## 2017-09-06 PROCEDURE — 99214 OFFICE O/P EST MOD 30 MIN: CPT | Performed by: INTERNAL MEDICINE

## 2017-09-06 NOTE — PROGRESS NOTES
Nephrology Progress Note      ASSESSMENT/PLAN:        1) CKD 3- baseline Cr approximately 2.7 mg/dL is due to hypertensive nephrosclerosis, diabetic nephropathy, chronic NSAID use and history of right-sided urinary obstruction due to complete ureteral comp • Diabetes ([facility]    • Diabetic peripheral neuropathy (HCC)    • Esophageal reflux    • Generalized weakness     uses a cane   • High blood pressure    • High cholesterol    • History of blood transfusion     March/2016   • History of stomach ulcers     r Yes              Comment: social       Medications (Active prior to today's visit):    Current Outpatient Prescriptions:  GlipiZIDE ER 5 MG Oral Tablet 24 Hr Take 1 tablet (5 mg total) by mouth daily.  Disp: 90 tablet Rfl: 1   Metoprolol Tartrate 50 MG Oral bowel sounds, no palpable organomegaly  Extremities: Without clubbing, cyanosis or edema.   Neurologic: Alert and oriented, normal affect, cranial nerves grossly intact, moving all extremities  Skin: Warm and dry, no rashes      Karolien-Joe Gong MD  9/6/2017

## 2017-09-19 ENCOUNTER — TELEPHONE (OUTPATIENT)
Dept: NEPHROLOGY | Facility: CLINIC | Age: 67
End: 2017-09-19

## 2017-09-19 ENCOUNTER — APPOINTMENT (OUTPATIENT)
Dept: LAB | Age: 67
End: 2017-09-19
Attending: INTERNAL MEDICINE
Payer: MEDICARE

## 2017-09-19 DIAGNOSIS — N17.9 AKI (ACUTE KIDNEY INJURY) (HCC): ICD-10-CM

## 2017-09-19 DIAGNOSIS — N18.4 CKD (CHRONIC KIDNEY DISEASE) STAGE 4, GFR 15-29 ML/MIN (HCC): ICD-10-CM

## 2017-09-19 LAB
BUN BLD-MCNC: 24 MG/DL (ref 8–20)
CALCIUM BLD-MCNC: 9.3 MG/DL (ref 8.3–10.3)
CHLORIDE: 107 MMOL/L (ref 101–111)
CO2: 23 MMOL/L (ref 22–32)
CREAT BLD-MCNC: 2.32 MG/DL (ref 0.7–1.3)
GLUCOSE BLD-MCNC: 226 MG/DL (ref 70–99)
POTASSIUM SERPL-SCNC: 4.5 MMOL/L (ref 3.6–5.1)
SODIUM SERPL-SCNC: 139 MMOL/L (ref 136–144)

## 2017-09-19 PROCEDURE — 80048 BASIC METABOLIC PNL TOTAL CA: CPT

## 2017-09-20 ENCOUNTER — TELEPHONE (OUTPATIENT)
Dept: NEPHROLOGY | Facility: CLINIC | Age: 67
End: 2017-09-20

## 2017-09-21 ENCOUNTER — TELEPHONE (OUTPATIENT)
Dept: NEPHROLOGY | Facility: CLINIC | Age: 67
End: 2017-09-21

## 2017-09-21 RX ORDER — HYDRALAZINE HYDROCHLORIDE 25 MG/1
25 TABLET, FILM COATED ORAL 2 TIMES DAILY
Qty: 60 TABLET | Refills: 11 | Status: SHIPPED | OUTPATIENT
Start: 2017-09-21 | End: 2018-09-17

## 2017-09-22 ENCOUNTER — TELEPHONE (OUTPATIENT)
Dept: NEPHROLOGY | Facility: CLINIC | Age: 67
End: 2017-09-22

## 2017-09-22 NOTE — TELEPHONE ENCOUNTER
D/W pt- BP remains above target, will start hydralazine 25 mg bid and to send BP diary in 2 weeks- marilyn daily

## 2017-10-19 ENCOUNTER — TELEPHONE (OUTPATIENT)
Dept: FAMILY MEDICINE CLINIC | Facility: CLINIC | Age: 67
End: 2017-10-19

## 2017-10-19 ENCOUNTER — OFFICE VISIT (OUTPATIENT)
Dept: FAMILY MEDICINE CLINIC | Facility: CLINIC | Age: 67
End: 2017-10-19

## 2017-10-19 VITALS
TEMPERATURE: 99 F | BODY MASS INDEX: 29 KG/M2 | HEART RATE: 72 BPM | RESPIRATION RATE: 14 BRPM | OXYGEN SATURATION: 98 % | DIASTOLIC BLOOD PRESSURE: 84 MMHG | WEIGHT: 212.31 LBS | SYSTOLIC BLOOD PRESSURE: 136 MMHG

## 2017-10-19 DIAGNOSIS — R53.82 CHRONIC FATIGUE: ICD-10-CM

## 2017-10-19 DIAGNOSIS — E55.9 VITAMIN D DEFICIENCY: ICD-10-CM

## 2017-10-19 DIAGNOSIS — C18.7 CANCER OF SIGMOID COLON (HCC): ICD-10-CM

## 2017-10-19 DIAGNOSIS — I35.0 AORTIC VALVE STENOSIS, ETIOLOGY OF CARDIAC VALVE DISEASE UNSPECIFIED: ICD-10-CM

## 2017-10-19 DIAGNOSIS — G62.9 NEUROPATHY: ICD-10-CM

## 2017-10-19 DIAGNOSIS — L97.521 SKIN ULCER OF LEFT FOOT, LIMITED TO BREAKDOWN OF SKIN (HCC): ICD-10-CM

## 2017-10-19 DIAGNOSIS — E11.42 TYPE 2 DIABETES MELLITUS WITH DIABETIC POLYNEUROPATHY, WITHOUT LONG-TERM CURRENT USE OF INSULIN (HCC): Primary | ICD-10-CM

## 2017-10-19 DIAGNOSIS — R53.1 GENERALIZED WEAKNESS: ICD-10-CM

## 2017-10-19 PROCEDURE — 99214 OFFICE O/P EST MOD 30 MIN: CPT | Performed by: FAMILY MEDICINE

## 2017-10-19 RX ORDER — FENOFIBRATE 67 MG/1
CAPSULE ORAL
Refills: 3 | COMMUNITY
Start: 2017-07-23 | End: 2017-11-10

## 2017-10-19 RX ORDER — CLOPIDOGREL BISULFATE 75 MG/1
TABLET ORAL
Refills: 2 | COMMUNITY
Start: 2017-07-23 | End: 2018-01-03

## 2017-10-19 NOTE — PROGRESS NOTES
Katie Rahman is a 79year old male. Patient presents with: Follow - Up: 6 month recheck  Foot Injury: sore on left foot      HPI:   Sore on his right foot for a month. This past week it scabbed over. Yesterday it was bleeding. No fevers.  It rubs on h sometimes   • Aortic stenosis    • Arrhythmia     afib   • Cancer ([facility] 2015    colon CA   • CKD (chronic kidney disease) stage 3, GFR 30-59 ml/min    • [facility]     Dx in 2015: tx with colon resection   • Diabetes ([facility]    • Diabetic peripheral abdominal pain and denies heartburn  NEURO: denies headaches    EXAM:   /84 (BP Location: Right arm, Patient Position: Sitting, Cuff Size: adult)   Pulse 72   Temp 98.7 °F (37.1 °C) (Temporal)   Resp 14   Wt 212 lb 4.8 oz   SpO2 98%   BMI 28.79 kg/m² when he gets draw for cardiology next time. - discussed doing cardiac rehab or at least starting to walk or exercise some more. Pt does not want to go to the gym or go to cardiac rehab.  Encouraged more activity at home, offered physical therapy to help s

## 2017-10-19 NOTE — TELEPHONE ENCOUNTER
Wife called to say she made appt with podiatrist, but that they didn't have the referral yet. I advised pt that the referral has to be approved by the Insurance and sometimes it takes a couple days.  I said that our office would call when the referral was a

## 2017-10-22 DIAGNOSIS — E78.1 PURE HYPERGLYCERIDEMIA: ICD-10-CM

## 2017-10-23 RX ORDER — ATORVASTATIN CALCIUM 10 MG/1
TABLET, FILM COATED ORAL
Qty: 90 TABLET | Refills: 3 | Status: SHIPPED | OUTPATIENT
Start: 2017-10-23 | End: 2018-10-22

## 2017-10-25 ENCOUNTER — TELEPHONE (OUTPATIENT)
Dept: FAMILY MEDICINE CLINIC | Facility: CLINIC | Age: 67
End: 2017-10-25

## 2017-10-25 NOTE — TELEPHONE ENCOUNTER
Pt was supposed to go to [facility] in [facility] for lab work, but he has an [employer]. The lab called asking if we could change the orders to [facility] and call and let the pt know.

## 2017-10-25 NOTE — TELEPHONE ENCOUNTER
Spoke with the wife and advised of the change- she v/u I faxed the lab orders to [facility] in the [facility]

## 2017-10-26 ENCOUNTER — PRIOR ORIGINAL RECORDS (OUTPATIENT)
Dept: OTHER | Age: 67
End: 2017-10-26

## 2017-10-28 ENCOUNTER — TELEPHONE (OUTPATIENT)
Dept: FAMILY MEDICINE CLINIC | Facility: CLINIC | Age: 67
End: 2017-10-28

## 2017-10-28 ENCOUNTER — MED REC SCAN ONLY (OUTPATIENT)
Dept: FAMILY MEDICINE CLINIC | Facility: CLINIC | Age: 67
End: 2017-10-28

## 2017-10-28 DIAGNOSIS — E55.9 VITAMIN D INSUFFICIENCY: Primary | ICD-10-CM

## 2017-10-28 DIAGNOSIS — R79.89 ABNORMAL THYROID BLOOD TEST: ICD-10-CM

## 2017-10-28 RX ORDER — ERGOCALCIFEROL 1.25 MG/1
50000 CAPSULE ORAL WEEKLY
Qty: 12 CAPSULE | Refills: 0 | Status: SHIPPED | OUTPATIENT
Start: 2017-10-28 | End: 2017-11-10

## 2017-10-28 NOTE — TELEPHONE ENCOUNTER
Notes Recorded by Parag Yip DO on 10/27/2017 at 4:54 PM CDT  Pls let pt know that his A1c is in the pre-diabetic range, which is good. Lets keep on the same medications. Vit D is low. I will send in script for one tab weekly for 3 months.  Thyroid is

## 2017-10-28 NOTE — TELEPHONE ENCOUNTER
Patient notified via detailed voicemail left at cell number (ok per  HIPAA consent)    Advised script will be sent to [facility] [address].     Routed to Dr Todd Cheng to review pended orders

## 2017-10-30 ENCOUNTER — PRIOR ORIGINAL RECORDS (OUTPATIENT)
Dept: OTHER | Age: 67
End: 2017-10-30

## 2017-10-30 LAB
ALT (SGPT): 15 U/L
AST (SGOT): 17 U/L
CHOLESTEROL, TOTAL: 106 MG/DL
HDL CHOLESTEROL: 35 MG/DL
TRIGLYCERIDES: 509 MG/DL

## 2017-11-07 ENCOUNTER — PRIOR ORIGINAL RECORDS (OUTPATIENT)
Dept: OTHER | Age: 67
End: 2017-11-07

## 2017-11-07 ENCOUNTER — PATIENT MESSAGE (OUTPATIENT)
Dept: FAMILY MEDICINE CLINIC | Facility: CLINIC | Age: 67
End: 2017-11-07

## 2017-11-07 DIAGNOSIS — E11.42 TYPE 2 DIABETES MELLITUS WITH DIABETIC POLYNEUROPATHY, WITHOUT LONG-TERM CURRENT USE OF INSULIN (HCC): ICD-10-CM

## 2017-11-07 DIAGNOSIS — E11.40 CHRONIC PAINFUL DIABETIC NEUROPATHY (HCC): ICD-10-CM

## 2017-11-07 RX ORDER — PREGABALIN 100 MG/1
100 CAPSULE ORAL 3 TIMES DAILY
Qty: 90 CAPSULE | Refills: 5 | Status: SHIPPED
Start: 2017-11-07 | End: 2017-11-09

## 2017-11-07 NOTE — TELEPHONE ENCOUNTER
From: Sammy Prescott  To: Giselle Falcon DO  Sent: 11/7/2017 2:15 PM CST  Subject: Prescription Question    Dear Dr. Stacey Melton needs to have a new prescription for his Lyrica.      Sincerely, Krystyna Arias

## 2017-11-08 ENCOUNTER — APPOINTMENT (OUTPATIENT)
Dept: HEMATOLOGY/ONCOLOGY | Facility: HOSPITAL | Age: 67
End: 2017-11-08
Payer: MEDICARE

## 2017-11-08 ENCOUNTER — PATIENT MESSAGE (OUTPATIENT)
Dept: FAMILY MEDICINE CLINIC | Facility: CLINIC | Age: 67
End: 2017-11-08

## 2017-11-08 DIAGNOSIS — E11.42 TYPE 2 DIABETES MELLITUS WITH DIABETIC POLYNEUROPATHY, WITHOUT LONG-TERM CURRENT USE OF INSULIN (HCC): ICD-10-CM

## 2017-11-08 DIAGNOSIS — E11.40 CHRONIC PAINFUL DIABETIC NEUROPATHY (HCC): ICD-10-CM

## 2017-11-08 NOTE — TELEPHONE ENCOUNTER
From: Rosario Cameron  To: Maryan Rm, DO  Sent: 11/8/2017 7:19 AM CST  Subject: Non-Urgent Medical Question    Hi Dr. Calvin Kent,    I thought Vitaliy Lowe could get Lyrica straight from [facility](and free!) until the end of the year, but his last bottle says No R

## 2017-11-09 RX ORDER — PREGABALIN 100 MG/1
100 CAPSULE ORAL 3 TIMES DAILY
Qty: 90 CAPSULE | Refills: 5 | Status: SHIPPED | OUTPATIENT
Start: 2017-11-09 | End: 2018-03-06

## 2017-11-09 NOTE — TELEPHONE ENCOUNTER
Needs lyrica sent to [employer] [facility], not local  Fax for [employer] [facility] is [phone]

## 2017-11-09 NOTE — TELEPHONE ENCOUNTER
Do we need to send a new script for them? I forget how it worked when you sent it earlier this year. See [employer] message.

## 2017-11-10 ENCOUNTER — OFFICE VISIT (OUTPATIENT)
Dept: HEMATOLOGY/ONCOLOGY | Facility: HOSPITAL | Age: 67
End: 2017-11-10
Attending: FAMILY MEDICINE
Payer: MEDICARE

## 2017-11-10 VITALS
OXYGEN SATURATION: 96 % | TEMPERATURE: 97 F | BODY MASS INDEX: 30.03 KG/M2 | WEIGHT: 214.5 LBS | HEART RATE: 75 BPM | DIASTOLIC BLOOD PRESSURE: 70 MMHG | HEIGHT: 70.98 IN | SYSTOLIC BLOOD PRESSURE: 151 MMHG | RESPIRATION RATE: 18 BRPM

## 2017-11-10 DIAGNOSIS — C18.7 CANCER OF SIGMOID COLON (HCC): ICD-10-CM

## 2017-11-10 PROCEDURE — 99213 OFFICE O/P EST LOW 20 MIN: CPT | Performed by: INTERNAL MEDICINE

## 2017-11-10 RX ORDER — CHLORAL HYDRATE 500 MG
1000 CAPSULE ORAL DAILY
COMMUNITY
End: 2018-03-06 | Stop reason: ALTCHOICE

## 2017-11-10 NOTE — PROGRESS NOTES
[facility] Progress Note    Patient Name: Margi Mora   YOB: 1950   Medical Record Number: [id number]   CSN: [id number]   Attending Physician: Meredith Lyle M.D.    Referring Physician: Wade Mojica DO      Date of Visit: 11/10/2017 abdominal pain, change in his urinary habits, headaches, dizziness or visual symptoms. Appetite is good. Had a colonoscopy 9/1/17.  There was some nodularity and friability at the ileo-colonic anastomosis which was biopsied- no evidence of dysplasia or mal stenosis    • Arrhythmia     afib   • [facility] 2015    colon CA   • CKD (chronic kidney disease) stage 3, GFR 30-59 ml/min    • [facility]     Dx in 2015: tx with colon resection   • Diabetes ([facility]    • Diabetic peripheral neuropathy ([facility]    • Relation Age of Onset   • Heart Disorder Father 62     MI   • Hypertension Father    • Heart Disorder Brother 67     MI   • Diabetes Neg        Psychosocial History:    Social History  Social History   Marital status:   Spouse name: N/A    Years of Ref Range: 8 - 20 mg/dL 22 (H)   CREATININE Latest Ref Range: 0.70 - 1.30 mg/dL 2.03 (H)   CALCIUM Latest Ref Range: 8.3 - 10.3 mg/dL 9.4   GFR Latest Ref Range: >=60  33 (L)   ALKALINE PHOSPHATASE Latest Ref Range: 45 - 117 U/L 114   AST (SGOT) Latest Ref colon      TECHNIQUE:  Following oral contrast administration, unenhanced multislice CT scanning is performed through the chest, abdomen, and pelvis.  Dose reduction techniques were used.      FINDINGS:         LUNGS:  There are few scattered calcified gra within the chest, abdomen, or pelvis.        2. Inflammatory stranding within the right lower quadrant mesentery have improved since recent studies consistent with improving post treatment/surgical change.              Dictated by:  Amisha Ham MD on 6/23/2

## 2017-11-11 ENCOUNTER — TELEPHONE (OUTPATIENT)
Dept: NEPHROLOGY | Facility: CLINIC | Age: 67
End: 2017-11-11

## 2017-11-13 DIAGNOSIS — I10 ESSENTIAL HYPERTENSION: ICD-10-CM

## 2017-11-13 RX ORDER — METOPROLOL TARTRATE 50 MG/1
TABLET, FILM COATED ORAL
Qty: 180 TABLET | Refills: 0 | Status: SHIPPED | OUTPATIENT
Start: 2017-11-13 | End: 2018-02-11

## 2017-11-14 ENCOUNTER — TELEPHONE (OUTPATIENT)
Dept: FAMILY MEDICINE CLINIC | Facility: CLINIC | Age: 67
End: 2017-11-14

## 2017-11-14 DIAGNOSIS — I73.9 PVD (PERIPHERAL VASCULAR DISEASE) (HCC): ICD-10-CM

## 2017-11-14 DIAGNOSIS — Z95.2 S/P TAVR (TRANSCATHETER AORTIC VALVE REPLACEMENT): Primary | ICD-10-CM

## 2017-11-14 DIAGNOSIS — I25.10 CORONARY ARTERY DISEASE INVOLVING NATIVE CORONARY ARTERY OF NATIVE HEART WITHOUT ANGINA PECTORIS: ICD-10-CM

## 2017-11-14 LAB
CHOLESTEROL, TOTAL: 95 MG/DL
HDL CHOLESTEROL: 34 MG/DL
LDL CHOLESTEROL: 27 MG/DL
TRIGLYCERIDES: 400 MG/DL

## 2017-11-14 NOTE — TELEPHONE ENCOUNTER
Pt has appt with Dr. Aaron Couch tomorrow.  Dr. Manohar Hartman office just called pt to advise they need a referral

## 2017-11-15 ENCOUNTER — PRIOR ORIGINAL RECORDS (OUTPATIENT)
Dept: OTHER | Age: 67
End: 2017-11-15

## 2017-11-16 ENCOUNTER — TELEPHONE (OUTPATIENT)
Dept: FAMILY MEDICINE CLINIC | Facility: CLINIC | Age: 67
End: 2017-11-16

## 2017-11-16 DIAGNOSIS — E13.621 FOOT ULCER DUE TO SECONDARY DM (HCC): Primary | ICD-10-CM

## 2017-11-16 DIAGNOSIS — L97.509 FOOT ULCER DUE TO SECONDARY DM (HCC): Primary | ICD-10-CM

## 2017-11-16 NOTE — TELEPHONE ENCOUNTER
Wife called, pt needs a referral for 8 visits to the wound center at [facility].   Please call spouse at [phone]

## 2017-11-22 ENCOUNTER — OFFICE VISIT (OUTPATIENT)
Dept: WOUND CARE | Age: 67
End: 2017-11-22
Attending: NURSE PRACTITIONER
Payer: MEDICARE

## 2017-11-22 DIAGNOSIS — L03.031 CELLULITIS OF RIGHT TOE: ICD-10-CM

## 2017-11-22 DIAGNOSIS — L97.509 DIABETIC FOOT ULCER (HCC): ICD-10-CM

## 2017-11-22 DIAGNOSIS — E11.621 DIABETIC FOOT ULCER (HCC): ICD-10-CM

## 2017-11-22 DIAGNOSIS — E11.59 DIABETES MELLITUS WITH CIRCULATORY COMPLICATION (HCC): Primary | ICD-10-CM

## 2017-11-22 PROCEDURE — 11730 AVULSION NAIL PLATE SIMPLE 1: CPT

## 2017-11-22 PROCEDURE — 99214 OFFICE O/P EST MOD 30 MIN: CPT

## 2017-11-22 NOTE — PROGRESS NOTES
Chief Complaint  This information was obtained from the patient  Patient is here for an initial consult. He presents with a diabetic wound on his left foot that began about a month ago.      Allergies  NKDA    HPI  This information was obtained from the pat Arrhythmia (Atrial Fibrillation)  Blood Transfusion (March 2016)  Hyperlipidemia    Surgical History  This information was obtained from the patient, chart  Patient has a surgical history of:  TAVR - 2/2/2017  Amputation (amputation Left great toe due to g Fish Oil 360 mg-144 mg-216 mg-1,200 mg capsule,delayed release oral capsule,delayed release(DR/EC) oral  Daily Multivitamin 200 mcg-100 mcg-500 mcg capsule oral capsule oral  Adult Low Dose Aspirin 81 mg tablet,delayed release oral tablet,delayed release ( Wound #1 Left, Lateral Foot is a chronic Bolivar Grade 2 Diabetic Ulcer and has received a status of Not Healed. Initial wound encounter measurements are 0.6cm length x 0.6cm width x 0.1cm depth, with an area of 0.36 sq cm and a volume of 0.036 cubic cm.  Jair Mena (Encounter Diagnosis) E11.59 - Type 2 diabetes mellitus with other circulatory complications  (Encounter Diagnosis) E11.621 - Type 2 diabetes mellitus with foot ulcer  (Encounter Diagnosis) L03.031 - Cellulitis of right toe  (Encounter Diagnosis) L97.524 - 2 Until your next appointment, inspect your wounds/surrounding area for signs of infection which may include the following:   a. Increase in redness/red “streaks from the wound  b. Increase in swelling  c. Fever  d. Unusual odor  e.  Increased or change in Osteomyelitis suspected due to: - depth of wound.  mri ordered today    Radiology:   MRI with and without Contrast - left foot dx: e11.621  Cardiovascular:   Arterial duplex ultrasound - bilateral dx: e11.59 PLEASE SCHEDULE WITH [facility]        I discu

## 2017-11-29 ENCOUNTER — OFFICE VISIT (OUTPATIENT)
Dept: WOUND CARE | Age: 67
End: 2017-11-29
Attending: NURSE PRACTITIONER
Payer: MEDICARE

## 2017-11-29 DIAGNOSIS — E11.59 DIABETES MELLITUS WITH CIRCULATORY COMPLICATION (HCC): Primary | ICD-10-CM

## 2017-11-29 DIAGNOSIS — L97.509 DIABETIC FOOT ULCER (HCC): ICD-10-CM

## 2017-11-29 DIAGNOSIS — E11.621 DIABETIC FOOT ULCER (HCC): ICD-10-CM

## 2017-11-29 PROCEDURE — 99213 OFFICE O/P EST LOW 20 MIN: CPT

## 2017-11-29 NOTE — PROGRESS NOTES
Chief Complaint  This information was obtained from the patient  Patient is here for a follow up for the left foot and right great toe. Patient states dressing changes have been going good. Denies any pain.      Allergies  NKDA    HPI  This information was Constitutional Symptoms (General Health):  Fatigue, Fever, Loss of Appetite, Marked Weight Change, Night Sweats, Chills  Eyes: Vision Changes  Respiratory: Cough, Shortness of Breath  Cardiovascular (Central/Peripheral): Chest Pain, Dyspnea on Exertion, [place] Wound #1 Left, Lateral Foot is a chronic Bolivar Grade 2 Diabetic Ulcer and has received a status of Not Healed. Subsequent wound encounter measurements are 0.6cm length x 0.6cm width x 0.2cm depth, with an area of 0.36 sq cm and a volume of 0.072 cubic cm. (Encounter Diagnosis) E11.621 - Type 2 diabetes mellitus with foot ulcer  (Encounter Diagnosis) L97.524 - Non-pressure chronic ulcer of other part of left foot with necrosis of bone  (Encounter Diagnosis) Z79.84 - Long term (current) use of oral hypoglycem 4 please schedule your arterial study and your mri    Misc/Additional Orders  Supplement with a daily multivitamin.   Increase dietary protein - look for Vimal by [employer] (These are essential branch chain amino acids that help with tissue building and w

## 2017-11-30 ENCOUNTER — HOSPITAL ENCOUNTER (OUTPATIENT)
Dept: MRI IMAGING | Age: 67
Discharge: HOME | End: 2017-11-30
Attending: NURSE PRACTITIONER
Payer: MEDICARE

## 2017-11-30 DIAGNOSIS — E11.621 DIABETIC FOOT ULCER WITH OSTEOMYELITIS (HCC): ICD-10-CM

## 2017-11-30 DIAGNOSIS — M86.9 DIABETIC FOOT ULCER WITH OSTEOMYELITIS (HCC): ICD-10-CM

## 2017-11-30 DIAGNOSIS — E11.69 DIABETIC FOOT ULCER WITH OSTEOMYELITIS (HCC): ICD-10-CM

## 2017-11-30 DIAGNOSIS — L97.509 DIABETIC FOOT ULCER WITH OSTEOMYELITIS (HCC): ICD-10-CM

## 2017-11-30 PROCEDURE — 73718 MRI LOWER EXTREMITY W/O DYE: CPT | Performed by: NURSE PRACTITIONER

## 2017-12-06 ENCOUNTER — OFFICE VISIT (OUTPATIENT)
Dept: WOUND CARE | Age: 67
End: 2017-12-06
Attending: NURSE PRACTITIONER
Payer: MEDICARE

## 2017-12-06 DIAGNOSIS — T14.8XXA NONHEALING NONSURGICAL WOUND: Primary | ICD-10-CM

## 2017-12-06 DIAGNOSIS — E11.59 DIABETES MELLITUS WITH CIRCULATORY COMPLICATION (HCC): ICD-10-CM

## 2017-12-06 PROCEDURE — 87186 SC STD MICRODIL/AGAR DIL: CPT

## 2017-12-06 PROCEDURE — 87147 CULTURE TYPE IMMUNOLOGIC: CPT

## 2017-12-06 PROCEDURE — 87077 CULTURE AEROBIC IDENTIFY: CPT

## 2017-12-06 PROCEDURE — 87070 CULTURE OTHR SPECIMN AEROBIC: CPT

## 2017-12-06 PROCEDURE — 99214 OFFICE O/P EST MOD 30 MIN: CPT

## 2017-12-06 PROCEDURE — 87205 SMEAR GRAM STAIN: CPT

## 2017-12-06 NOTE — PROGRESS NOTES
Chief Complaint  This information was obtained from the patient  Patient is here for a left foot wound. Allergies  NKDA    HPI  This information was obtained from the patient, caregiver and chart.   11-22-17 INITIAL: patient is a 80 yo white, ambulatory Neurological: Loss of Protective Sensation (bilaterally insensate)  Psychiatric: Nervousness / Tension  Prior Wound History: Other (?? osteo with probe to bone)    Patient denies complaints or symptoms related to:   Constitutional Symptoms (General Health) Wound #1 Left, Lateral Foot is a chronic Bolivar Grade 2 Diabetic Ulcer and has received a status of Not Healed. Subsequent wound encounter measurements are 0.8cm length x 0.6cm width x 0.3cm depth, with an area of 0.48 sq cm and a volume of 0.144 cubic cm. Wash your hands with soap and water. Remove soiled dressing, discard into plastic bag and place into trash. Cleanse the wound with Saline/wound cleanser as directed using gauze sponges. Pat dry. Apply clean dressing as directed    Additional Orders:     Wou Vitamin C: Citrus fruits and juices, strawberries, tomatoes, tomato juice, peppers, baked potatoes, spinach, broccoli, cauliflower, [place] sprouts, cabbage  Vitamin A: Dark green, leafy vegetables, orange or yellow vegetables, cantaloupe, fortified dairy I discussed with patient and spouse mri results and how they showed bone marrow edema around/under the ulcer but no ed bony destruction, we discussed ordering inflammatory markers and we took a cx today, arterial studies are scheduled for later this wee

## 2017-12-08 ENCOUNTER — HOSPITAL ENCOUNTER (OUTPATIENT)
Dept: CARDIOLOGY CLINIC | Facility: HOSPITAL | Age: 67
Discharge: HOME | End: 2017-12-08
Attending: NURSE PRACTITIONER

## 2017-12-08 ENCOUNTER — MYAURORA ACCOUNT LINK (OUTPATIENT)
Dept: OTHER | Age: 67
End: 2017-12-08

## 2017-12-08 DIAGNOSIS — I73.9 PVD (PERIPHERAL VASCULAR DISEASE) (HCC): ICD-10-CM

## 2017-12-12 ENCOUNTER — MED REC SCAN ONLY (OUTPATIENT)
Dept: FAMILY MEDICINE CLINIC | Facility: CLINIC | Age: 67
End: 2017-12-12

## 2017-12-13 ENCOUNTER — OFFICE VISIT (OUTPATIENT)
Dept: WOUND CARE | Age: 67
End: 2017-12-13
Attending: NURSE PRACTITIONER
Payer: MEDICARE

## 2017-12-13 DIAGNOSIS — L97.509 DIABETIC FOOT ULCER (HCC): ICD-10-CM

## 2017-12-13 DIAGNOSIS — E11.59 DIABETES MELLITUS WITH CIRCULATORY COMPLICATION (HCC): Primary | ICD-10-CM

## 2017-12-13 DIAGNOSIS — E11.621 DIABETIC FOOT ULCER (HCC): ICD-10-CM

## 2017-12-13 PROCEDURE — 99213 OFFICE O/P EST LOW 20 MIN: CPT

## 2017-12-20 ENCOUNTER — PRIOR ORIGINAL RECORDS (OUTPATIENT)
Dept: OTHER | Age: 67
End: 2017-12-20

## 2018-01-03 ENCOUNTER — APPOINTMENT (OUTPATIENT)
Dept: WOUND CARE | Age: 68
End: 2018-01-03
Attending: NURSE PRACTITIONER
Payer: MEDICARE

## 2018-01-03 NOTE — H&P
Lucius Garsia  : 1950  ACCOUNT:  [id number]  [phone]  PCP: Dr. Chidi Rahman DATE: 2017  DICTATED BY:  [Dr. Lora Hennessy    CHIEF COMPLAINT: [Followup of Peripheral Vascular Disease, Unspecified and Left Foot Wound.] tobacco use. Quit 25 yrs. ago, smoked 1 pack/day. CAFFEINE: 1 cup coffee daily. ALCOHOL: drinks 3-4 per day. EXERCISE: no regular exercise. DIET: no special diet. MARITAL STATUS: . OCCUPATION: part-time [employer].      ALLERGIES: No Known Allergies Aortic stenosis  3. Atherosclerosis, extremity, bilateral legs  4. Carotid artery stenosis, bilateral  5. Cerebrovascular accident  6. DM, Type II  7. Lipid disorder, hypertriglyceridemia  8. Peripheral Vascular Disease, Unspecified  9.  Prosthetic valve, b 12/21/2017   T: 12/22/2017  C: Dr. Adalberto Canales    No interim change in status. Still with open ulceration left lateral portion of foot at base of 5th toe. Will proceed with angiography as planned.  No inflow disease on any previous studies so will a

## 2018-01-05 ENCOUNTER — PRIOR ORIGINAL RECORDS (OUTPATIENT)
Dept: OTHER | Age: 68
End: 2018-01-05

## 2018-01-05 ENCOUNTER — HOSPITAL ENCOUNTER (OUTPATIENT)
Dept: INTERVENTIONAL RADIOLOGY/VASCULAR | Facility: HOSPITAL | Age: 68
Discharge: HOME | End: 2018-01-05
Attending: INTERNAL MEDICINE | Admitting: INTERNAL MEDICINE
Payer: MEDICARE

## 2018-01-05 ENCOUNTER — APPOINTMENT (OUTPATIENT)
Dept: GENERAL RADIOLOGY | Facility: HOSPITAL | Age: 68
End: 2018-01-05
Attending: INTERNAL MEDICINE
Payer: MEDICARE

## 2018-01-05 VITALS
OXYGEN SATURATION: 99 % | RESPIRATION RATE: 11 BRPM | HEIGHT: 72 IN | DIASTOLIC BLOOD PRESSURE: 59 MMHG | WEIGHT: 210 LBS | SYSTOLIC BLOOD PRESSURE: 142 MMHG | BODY MASS INDEX: 28.44 KG/M2 | TEMPERATURE: 98 F | HEART RATE: 62 BPM

## 2018-01-05 DIAGNOSIS — E11.40 CHRONIC PAINFUL DIABETIC NEUROPATHY (HCC): ICD-10-CM

## 2018-01-05 DIAGNOSIS — E11.42 TYPE 2 DIABETES MELLITUS WITH DIABETIC POLYNEUROPATHY, WITHOUT LONG-TERM CURRENT USE OF INSULIN (HCC): ICD-10-CM

## 2018-01-05 DIAGNOSIS — I73.9 PVD (PERIPHERAL VASCULAR DISEASE) (HCC): ICD-10-CM

## 2018-01-05 LAB
ATRIAL RATE: 65 BPM
BASOPHILS # BLD AUTO: 0.05 X10(3) UL (ref 0–0.1)
BASOPHILS NFR BLD AUTO: 0.7 %
BUN BLD-MCNC: 21 MG/DL (ref 8–20)
CALCIUM BLD-MCNC: 9.3 MG/DL (ref 8.3–10.3)
CHLORIDE: 109 MMOL/L (ref 101–111)
CO2: 25 MMOL/L (ref 22–32)
CREAT BLD-MCNC: 1.96 MG/DL (ref 0.7–1.3)
EOSINOPHIL # BLD AUTO: 0.39 X10(3) UL (ref 0–0.3)
EOSINOPHIL NFR BLD AUTO: 5.2 %
ERYTHROCYTE [DISTWIDTH] IN BLOOD BY AUTOMATED COUNT: 13.4 % (ref 11.5–16)
GLUCOSE BLD-MCNC: 165 MG/DL (ref 65–99)
GLUCOSE BLD-MCNC: 167 MG/DL (ref 70–99)
HCT VFR BLD AUTO: 41.6 % (ref 37–53)
HGB BLD-MCNC: 14.4 G/DL (ref 13–17)
IMMATURE GRANULOCYTE COUNT: 0.01 X10(3) UL (ref 0–1)
IMMATURE GRANULOCYTE RATIO %: 0.1 %
ISTAT ACTIVATED CLOTTING TIME: 323 SECONDS (ref 74–137)
ISTAT ACTIVATED CLOTTING TIME: 351 SECONDS (ref 74–137)
LYMPHOCYTES # BLD AUTO: 1.48 X10(3) UL (ref 0.9–4)
LYMPHOCYTES NFR BLD AUTO: 19.6 %
MCH RBC QN AUTO: 28.5 PG (ref 27–33.2)
MCHC RBC AUTO-ENTMCNC: 34.6 G/DL (ref 31–37)
MCV RBC AUTO: 82.2 FL (ref 80–99)
MONOCYTES # BLD AUTO: 0.57 X10(3) UL (ref 0.1–0.6)
MONOCYTES NFR BLD AUTO: 7.5 %
NEUTROPHIL ABS PRELIM: 5.06 X10 (3) UL (ref 1.3–6.7)
NEUTROPHILS # BLD AUTO: 5.06 X10(3) UL (ref 1.3–6.7)
NEUTROPHILS NFR BLD AUTO: 66.9 %
P AXIS: 68 DEGREES
P-R INTERVAL: 190 MS
PLATELET # BLD AUTO: 104 10(3)UL (ref 150–450)
POTASSIUM SERPL-SCNC: 4.5 MMOL/L (ref 3.6–5.1)
Q-T INTERVAL: 438 MS
QRS DURATION: 92 MS
QTC CALCULATION (BEZET): 455 MS
R AXIS: 2 DEGREES
RBC # BLD AUTO: 5.06 X10(6)UL (ref 3.8–5.8)
RED CELL DISTRIBUTION WIDTH-SD: 39.4 FL (ref 35.1–46.3)
SODIUM SERPL-SCNC: 141 MMOL/L (ref 136–144)
T AXIS: 106 DEGREES
VENTRICULAR RATE: 65 BPM
WBC # BLD AUTO: 7.6 X10(3) UL (ref 4–13)

## 2018-01-05 PROCEDURE — 75710 ARTERY X-RAYS ARM/LEG: CPT

## 2018-01-05 PROCEDURE — 047U3Z1 DILATION OF LEFT PERONEAL ARTERY USING DRUG-COATED BALLOON, PERCUTANEOUS APPROACH: ICD-10-PCS | Performed by: INTERNAL MEDICINE

## 2018-01-05 PROCEDURE — 80048 BASIC METABOLIC PNL TOTAL CA: CPT | Performed by: INTERNAL MEDICINE

## 2018-01-05 PROCEDURE — 047L3Z1 DILATION OF LEFT FEMORAL ARTERY USING DRUG-COATED BALLOON, PERCUTANEOUS APPROACH: ICD-10-PCS | Performed by: INTERNAL MEDICINE

## 2018-01-05 PROCEDURE — 85347 COAGULATION TIME ACTIVATED: CPT

## 2018-01-05 PROCEDURE — 047N3Z1 DILATION OF LEFT POPLITEAL ARTERY USING DRUG-COATED BALLOON, PERCUTANEOUS APPROACH: ICD-10-PCS | Performed by: INTERNAL MEDICINE

## 2018-01-05 PROCEDURE — 82962 GLUCOSE BLOOD TEST: CPT

## 2018-01-05 PROCEDURE — 99153 MOD SED SAME PHYS/QHP EA: CPT

## 2018-01-05 PROCEDURE — 047S3ZZ DILATION OF LEFT POSTERIOR TIBIAL ARTERY, PERCUTANEOUS APPROACH: ICD-10-PCS | Performed by: INTERNAL MEDICINE

## 2018-01-05 PROCEDURE — 37224 HC TRANSLUMINAL ANGIOPLASTY FEM POP UNILATERAL: CPT

## 2018-01-05 PROCEDURE — A4216 STERILE WATER/SALINE, 10 ML: HCPCS

## 2018-01-05 PROCEDURE — 85025 COMPLETE CBC W/AUTO DIFF WBC: CPT | Performed by: INTERNAL MEDICINE

## 2018-01-05 PROCEDURE — 37232 HC TRANSL ANGIOPLASTY TIBIAL PERONEAL UNIL EA ADDL: CPT

## 2018-01-05 PROCEDURE — 71045 X-RAY EXAM CHEST 1 VIEW: CPT | Performed by: INTERNAL MEDICINE

## 2018-01-05 PROCEDURE — 3E033PZ INTRODUCTION OF PLATELET INHIBITOR INTO PERIPHERAL VEIN, PERCUTANEOUS APPROACH: ICD-10-PCS | Performed by: INTERNAL MEDICINE

## 2018-01-05 PROCEDURE — 93010 ELECTROCARDIOGRAM REPORT: CPT | Performed by: INTERNAL MEDICINE

## 2018-01-05 PROCEDURE — 37228 HC TRANSLUMINAL ANGIO TIBIAL PERONEAL UNILAT INIT: CPT

## 2018-01-05 PROCEDURE — 93005 ELECTROCARDIOGRAM TRACING: CPT

## 2018-01-05 PROCEDURE — 99152 MOD SED SAME PHYS/QHP 5/>YRS: CPT

## 2018-01-05 RX ORDER — CLOPIDOGREL BISULFATE 75 MG/1
75 TABLET ORAL DAILY
Status: DISCONTINUED | OUTPATIENT
Start: 2018-01-06 | End: 2018-01-05

## 2018-01-05 RX ORDER — HEPARIN SODIUM 5000 [USP'U]/ML
INJECTION, SOLUTION INTRAVENOUS; SUBCUTANEOUS
Status: COMPLETED
Start: 2018-01-05 | End: 2018-01-05

## 2018-01-05 RX ORDER — SODIUM CHLORIDE 9 MG/ML
INJECTION, SOLUTION INTRAVENOUS CONTINUOUS
Status: ACTIVE | OUTPATIENT
Start: 2018-01-05 | End: 2018-01-05

## 2018-01-05 RX ORDER — CLOPIDOGREL BISULFATE 75 MG/1
75 TABLET ORAL DAILY
Qty: 30 TABLET | Refills: 0 | Status: SHIPPED | OUTPATIENT
Start: 2018-01-06 | End: 2018-02-05

## 2018-01-05 RX ORDER — LIDOCAINE HYDROCHLORIDE 10 MG/ML
INJECTION, SOLUTION EPIDURAL; INFILTRATION; INTRACAUDAL; PERINEURAL
Status: COMPLETED
Start: 2018-01-05 | End: 2018-01-05

## 2018-01-05 RX ORDER — SODIUM CHLORIDE 9 MG/ML
INJECTION, SOLUTION INTRAVENOUS CONTINUOUS
Status: DISCONTINUED | OUTPATIENT
Start: 2018-01-05 | End: 2018-01-05

## 2018-01-05 RX ORDER — MIDAZOLAM HYDROCHLORIDE 1 MG/ML
INJECTION INTRAMUSCULAR; INTRAVENOUS
Status: COMPLETED
Start: 2018-01-05 | End: 2018-01-05

## 2018-01-05 RX ORDER — CLOPIDOGREL BISULFATE 75 MG/1
TABLET ORAL
Status: COMPLETED
Start: 2018-01-05 | End: 2018-01-05

## 2018-01-05 RX ADMIN — SODIUM CHLORIDE: 9 INJECTION, SOLUTION INTRAVENOUS at 08:30:00

## 2018-01-05 NOTE — PROGRESS NOTES
Prelim angio    Moderate angiographic mid-distal SFA disease on left (significant by non-invasive doppler flow measurements).     AT occluded proximally    Critical disease involving distal popliteal, tibioperoneal trunk, proximal peroneal and proximal PT

## 2018-01-05 NOTE — PROGRESS NOTES
Pt A/Ox4. BE[place]. Right groin with dressing, CDI - soft, no hematoma, +pulses with doppler only. Denies any pain. VSS. Voiding without difficulty. Off bedrest at 1500, ambulated in room with cane/SBA without complications.  Discharge instructions given, pt tray

## 2018-01-10 ENCOUNTER — OFFICE VISIT (OUTPATIENT)
Dept: WOUND CARE | Age: 68
End: 2018-01-10
Attending: NURSE PRACTITIONER
Payer: MEDICARE

## 2018-01-10 DIAGNOSIS — E11.59 DIABETES MELLITUS WITH CIRCULATORY COMPLICATION (HCC): Primary | ICD-10-CM

## 2018-01-10 PROCEDURE — 97597 DBRDMT OPN WND 1ST 20 CM/<: CPT

## 2018-01-10 NOTE — PROGRESS NOTES
Chief Complaint  This information was obtained from the patient, caregiver  Patient here for a f/u of his left foot. Patient feels wound is improving. Allergies  NKDA    HPI  This information was obtained from the patient, caregiver and chart.   11-22-1 1-10-17 patient returns today, notes in epic reviewed patient \"reconsidered\" arterial intervention and this was done with dr. Dawson Mcburney on 1-5-18 with a \"very nice acute result with good 2 vessel runoff to foot\".  patient states his foot is now warm and h bp wnl for patient. Pulse Regular and wnl for patient. Burlene [place]ente Respirations easy and unlabored. Temperature wnl. Overweight. Appearance neat and clean. Appears in no acute distress. Well nourished and well developed.  Height/Length: 72 in (182.88 cm), Weight: 212 judgement and insight is intact, however assistance by family for medical decision-making. Alert and oriented times 3. No evidence of depression, anxiety, or agitation. Calm, cooperative, and communicative. Appropriate interactions and affect.         Asses Other: - You may shower as long as your wound/bandage is protected and will not get wet. You can purchase a “cast cover” at most pharmacies to protect your bandages. If your bandages get wet, remove immediately and redress/contact wound center.     Cleansin Increase dietary protein - look for Vimal by [employer] (These are essential branch chain amino acids that help with tissue building and wound healing) and take 2 packets/day.  you can order online at [employer] or [address] [place] [address] or some [employer] carry it or can orde I am so pleasantly surprised that the patient pursued arterial intervention. foot is warm with pulsatile signals distal to the wound. transition to collagen dressing in an attempt to stimulate healing while blood flow is optimized.   Electronic Signature(s)

## 2018-01-13 NOTE — PROCEDURES
[facility]    PATIENT'S NAME: Carleen Nick   ATTENDING PHYSICIAN: Aidan Encarnacion M.D. OPERATING PHYSICIAN: Aidan Encarnacion M.D.    PATIENT ACCOUNT#:   [de-identified]    LOCATION:  [facility] 3 EDWP 10  MEDICAL RECORD #:   [id number]       DATE OF B the lesions were nonobstructive; however, within the mid vessel, there were several hazy area suggesting significant underlying plaque. The greatest angiographic stenosis was approximately 50% to 60%.   Noninvasive imaging suggested significant obstruction This was done with the same 2.5 x 30 mm balloon catheter. This gave an excellent acute angiographic result.     The left superficial femoral artery was then dilated through its mid and distal portion with a 6 x 120 balloon catheter and then final balloon i

## 2018-01-17 ENCOUNTER — TELEPHONE (OUTPATIENT)
Dept: FAMILY MEDICINE CLINIC | Facility: CLINIC | Age: 68
End: 2018-01-17

## 2018-01-19 LAB
BUN: 21 MG/DL
CALCIUM: 9.3 MG/DL
CHLORIDE: 109 MEQ/L
CREATININE, SERUM: 1.96 MG/DL
GLUCOSE: 167 MG/DL
POTASSIUM, SERUM: 4.5 MEQ/L
SODIUM: 141 MEQ/L

## 2018-01-22 ENCOUNTER — PRIOR ORIGINAL RECORDS (OUTPATIENT)
Dept: OTHER | Age: 68
End: 2018-01-22

## 2018-01-22 ENCOUNTER — OFFICE VISIT (OUTPATIENT)
Dept: FAMILY MEDICINE CLINIC | Facility: CLINIC | Age: 68
End: 2018-01-22

## 2018-01-22 VITALS
SYSTOLIC BLOOD PRESSURE: 130 MMHG | HEIGHT: 70 IN | HEART RATE: 67 BPM | BODY MASS INDEX: 30.06 KG/M2 | DIASTOLIC BLOOD PRESSURE: 80 MMHG | TEMPERATURE: 99 F | WEIGHT: 210 LBS

## 2018-01-22 DIAGNOSIS — Z95.2 S/P TAVR (TRANSCATHETER AORTIC VALVE REPLACEMENT): ICD-10-CM

## 2018-01-22 DIAGNOSIS — R93.0 ABNORMAL MAGNETIC RESONANCE IMAGING OF HEAD: ICD-10-CM

## 2018-01-22 DIAGNOSIS — I25.10 CORONARY ARTERY DISEASE INVOLVING NATIVE CORONARY ARTERY OF NATIVE HEART WITHOUT ANGINA PECTORIS: ICD-10-CM

## 2018-01-22 DIAGNOSIS — G62.9 NEUROPATHY: ICD-10-CM

## 2018-01-22 DIAGNOSIS — M20.42 HAMMER TOE OF LEFT FOOT: ICD-10-CM

## 2018-01-22 DIAGNOSIS — Z91.81 AT RISK FOR FALLING: ICD-10-CM

## 2018-01-22 DIAGNOSIS — Z23 NEED FOR VACCINATION: ICD-10-CM

## 2018-01-22 DIAGNOSIS — L97.522 ULCERATED, FOOT, LEFT, WITH FAT LAYER EXPOSED (HCC): ICD-10-CM

## 2018-01-22 DIAGNOSIS — Z89.412 STATUS POST AMPUTATION OF LEFT GREAT TOE (HCC): ICD-10-CM

## 2018-01-22 DIAGNOSIS — C18.7 CANCER OF SIGMOID COLON (HCC): ICD-10-CM

## 2018-01-22 DIAGNOSIS — Z13.1 SCREENING FOR DIABETES MELLITUS (DM): ICD-10-CM

## 2018-01-22 DIAGNOSIS — D69.6 THROMBOCYTOPENIA (HCC): ICD-10-CM

## 2018-01-22 DIAGNOSIS — I35.0 NONRHEUMATIC AORTIC VALVE STENOSIS: ICD-10-CM

## 2018-01-22 DIAGNOSIS — I73.9 PVD (PERIPHERAL VASCULAR DISEASE) (HCC): ICD-10-CM

## 2018-01-22 DIAGNOSIS — Z13.6 SCREENING FOR CARDIOVASCULAR CONDITION: ICD-10-CM

## 2018-01-22 DIAGNOSIS — N18.30 CHRONIC KIDNEY DISEASE, STAGE 3, MOD DECREASED GFR (HCC): ICD-10-CM

## 2018-01-22 DIAGNOSIS — Z87.39 HISTORY OF OSTEOMYELITIS: ICD-10-CM

## 2018-01-22 DIAGNOSIS — Z86.79 HISTORY OF ATRIAL FIBRILLATION: ICD-10-CM

## 2018-01-22 DIAGNOSIS — Z00.00 ENCOUNTER FOR ANNUAL HEALTH EXAMINATION: Primary | ICD-10-CM

## 2018-01-22 DIAGNOSIS — E11.42 TYPE 2 DIABETES MELLITUS WITH DIABETIC POLYNEUROPATHY, WITHOUT LONG-TERM CURRENT USE OF INSULIN (HCC): ICD-10-CM

## 2018-01-22 LAB
ALBUMIN SERPL-MCNC: 3.9 G/DL (ref 3.5–4.8)
ALP LIVER SERPL-CCNC: 111 U/L (ref 45–117)
ALT SERPL-CCNC: 26 U/L (ref 17–63)
AST SERPL-CCNC: 21 U/L (ref 15–41)
BILIRUB SERPL-MCNC: 1 MG/DL (ref 0.1–2)
BUN BLD-MCNC: 22 MG/DL (ref 8–20)
CALCIUM BLD-MCNC: 9.4 MG/DL (ref 8.3–10.3)
CHLORIDE: 107 MMOL/L (ref 101–111)
CHOLEST SMN-MCNC: 114 MG/DL (ref ?–200)
CO2: 24 MMOL/L (ref 22–32)
CREAT BLD-MCNC: 2.15 MG/DL (ref 0.7–1.3)
EST. AVERAGE GLUCOSE BLD GHB EST-MCNC: 128 MG/DL (ref 68–126)
GLUCOSE BLD-MCNC: 142 MG/DL (ref 70–99)
HBA1C MFR BLD HPLC: 6.1 % (ref ?–5.7)
HDLC SERPL-MCNC: 38 MG/DL (ref 45–?)
HDLC SERPL: 3 {RATIO} (ref ?–4.97)
LDLC SERPL DIRECT ASSAY-MCNC: 28 MG/DL (ref ?–100)
M PROTEIN MFR SERPL ELPH: 8.3 G/DL (ref 6.1–8.3)
NONHDLC SERPL-MCNC: 76 MG/DL (ref ?–130)
POTASSIUM SERPL-SCNC: 4.5 MMOL/L (ref 3.6–5.1)
SODIUM SERPL-SCNC: 138 MMOL/L (ref 136–144)
TRIGL SERPL-MCNC: 626 MG/DL (ref ?–150)

## 2018-01-22 PROCEDURE — 80061 LIPID PANEL: CPT | Performed by: FAMILY MEDICINE

## 2018-01-22 PROCEDURE — 80053 COMPREHEN METABOLIC PANEL: CPT | Performed by: FAMILY MEDICINE

## 2018-01-22 PROCEDURE — 83721 ASSAY OF BLOOD LIPOPROTEIN: CPT | Performed by: FAMILY MEDICINE

## 2018-01-22 PROCEDURE — G0438 PPPS, INITIAL VISIT: HCPCS | Performed by: FAMILY MEDICINE

## 2018-01-22 PROCEDURE — 96160 PT-FOCUSED HLTH RISK ASSMT: CPT | Performed by: FAMILY MEDICINE

## 2018-01-22 PROCEDURE — 83036 HEMOGLOBIN GLYCOSYLATED A1C: CPT | Performed by: FAMILY MEDICINE

## 2018-01-22 NOTE — PATIENT INSTRUCTIONS
Mercedez Ariza's SCREENING SCHEDULE   Tests on this list are recommended by your physician but may not be covered, or covered at this frequency, by your insurer. Please check with your insurance carrier before scheduling to verify coverage.     Katherine Nur Colonoscopy Screen   Covered every 10 years- more often if abnormal Colonoscopy,10 Years due on 09/01/2027 Update Health Maintenance if applicable    Flex Sigmoidoscopy Screen  Covered every 5 years No results found for this or any previous visit.  No fl injectable drug abusers     Tetanus Toxoid- Only covered with a cut with metal- TD and TDaP Not covered by Medicare Part B) No orders found for this or any previous visit.  This may be covered with your prescription benefits, but Medicare does not cover unl

## 2018-01-22 NOTE — PROGRESS NOTES
HPI:   Camelia Marks is a 79year old male who presents for a MA (Medicare Advantage) [facility] (Once per calendar year). Diarrhea: has had a couple bad bouts of diarrhea. No blood or pain.  Can't get to the bathroom in time, twice in the past few da Ability/Status   Brayden Person has some abnormal functions as listed below:  He has no issues with dressing and bathing based on screening of functional status. He has Vision problems based on screening of functional status.    V kidney disease, stage 3, mod decreased GFR     At risk for falling     Cancer of sigmoid colon (HCC)     Abnormal magnetic resonance imaging of head     Nonrheumatic aortic valve stenosis     PVD (peripheral vascular disease) (HCC)     Ulcerated, foot, lef Hyperlipidemia; Hyperlipidemia LDL goal <70; Hypertension, essential, benign; Muscle weakness; Neuropathy; Peripheral vascular disease ([facility] Renal disorder; S/P TAVR (transcatheter aortic valve replacement); Stroke ([facility] ([facility]  Type 70\"   Wt 210 lb   BMI 30.13 kg/m²   Estimated body mass index is 30.13 kg/m² as calculated from the following:    Height as of this encounter: 70\". Weight as of this encounter: 210 lb.     Medicare Hearing Assessment  (Required for AWV/SWV)    Finger R 01/22/2018   • Pneumococcal (Prevnar 13) 07/07/2016, 01/20/2017, 01/22/2018, 01/22/2018        ASSESSMENT AND OTHER RELEVANT CHRONIC CONDITIONS:   Carolina Dixon is a 79year old male who presents for a Medicare Assessment.      PLAN SUMMARY:   Diagnoses Thrombocytopenia ([facility]  - noted, cbc just done last week, platelets 112, monitor . History of osteomyelitis  - noted. Status post amputation of left great toe ([facility]  - noted, well healed now.      At risk for falling  - continue to use cane for Flex Sigmoidoscopy Screen every 10 years No results found for this or any previous visit. No flowsheet data found.      Fecal Occult Blood Annually Occult Blood Result (no units)   Date Value   04/13/2016 Positive for Occult Blood (A)    No flowsheet data f 2.15 (H)    No flowsheet data found. Drug Serum Conc  Annually No results found for: DIGOXIN, DIG, VALP No flowsheet data found.        Diabetes      HgbA1C  Annually HEMOGLOBIN A1c (% of total Hgb)   Date Value   10/26/2017 5.8 (H)     HgbA1C (%)   Date

## 2018-01-23 DIAGNOSIS — E11.42 TYPE 2 DIABETES MELLITUS WITH DIABETIC POLYNEUROPATHY, WITHOUT LONG-TERM CURRENT USE OF INSULIN (HCC): Primary | ICD-10-CM

## 2018-01-24 ENCOUNTER — OFFICE VISIT (OUTPATIENT)
Dept: WOUND CARE | Age: 68
End: 2018-01-24
Attending: NURSE PRACTITIONER
Payer: MEDICARE

## 2018-01-24 DIAGNOSIS — E11.59 DIABETES MELLITUS WITH CIRCULATORY COMPLICATION (HCC): Primary | ICD-10-CM

## 2018-01-24 DIAGNOSIS — L97.509 DIABETIC FOOT ULCER (HCC): ICD-10-CM

## 2018-01-24 DIAGNOSIS — E11.42 TYPE 2 DIABETES MELLITUS WITH DIABETIC POLYNEUROPATHY, WITHOUT LONG-TERM CURRENT USE OF INSULIN (HCC): ICD-10-CM

## 2018-01-24 DIAGNOSIS — E11.621 DIABETIC FOOT ULCER (HCC): ICD-10-CM

## 2018-01-24 PROCEDURE — 99213 OFFICE O/P EST LOW 20 MIN: CPT

## 2018-01-24 RX ORDER — GLIPIZIDE 5 MG/1
TABLET, FILM COATED, EXTENDED RELEASE ORAL
Qty: 90 TABLET | Refills: 0 | Status: SHIPPED | OUTPATIENT
Start: 2018-01-24 | End: 2018-04-16

## 2018-01-24 NOTE — PROGRESS NOTES
Chief Complaint  This information was obtained from the patient  Patient is here for a follow up for the left foot.      Allergies  NKDA    HPI  This information was obtained from the patient  11-22-17 INITIAL: patient is a 80 yo white, ambulatory, well con Integumentary (Hair/Skin/Nails): Calluses/Corns, Ulcers  Neurological: Loss of Protective Sensation (bilaterally insensate)  Psychiatric: Nervousness / Tension  Prior Wound History: Other (?? osteo with probe to bone)    Patient denies complaints or sympto Wound #1 Left, Lateral Foot is a chronic Bolivar Grade 3 Diabetic Ulcer and has received a status of Not Healed. Subsequent wound encounter measurements are 0.8cm length x 1cm width x 0.1cm depth, with an area of 0.8 sq cm and a volume of 0.08 cubic cm.  Sushma Matute Other: - You may shower as long as your wound/bandage is protected and will not get wet. You can purchase a “cast cover” at most pharmacies to protect your bandages. If your bandages get wet, remove immediately and redress/contact wound center.     Cleansin Increase dietary protein - look for Vimal by [employer] (These are essential branch chain amino acids that help with tissue building and wound healing) and take 2 packets/day.  you can order online at [employer] or [address] [place] [address] or some [employer] carry it or can orde Regranex topical 0.01 % gel gel topical once daily for 30 days for diabetic foot wound for diabetic foot wound starting 1/24/2018        I d/w patient and spouse that wound is essentially the same. we discussed granulation tissue.  I also discussed regranex

## 2018-01-31 ENCOUNTER — TELEPHONE (OUTPATIENT)
Dept: FAMILY MEDICINE CLINIC | Facility: CLINIC | Age: 68
End: 2018-01-31

## 2018-02-07 ENCOUNTER — HOSPITAL ENCOUNTER (OUTPATIENT)
Dept: CARDIOLOGY CLINIC | Facility: HOSPITAL | Age: 68
Discharge: HOME | End: 2018-02-10

## 2018-02-07 ENCOUNTER — HOSPITAL ENCOUNTER (OUTPATIENT)
Dept: CARDIOLOGY CLINIC | Facility: HOSPITAL | Age: 68
End: 2018-02-07
Attending: NURSE PRACTITIONER
Payer: MEDICARE

## 2018-02-11 DIAGNOSIS — I10 ESSENTIAL HYPERTENSION: ICD-10-CM

## 2018-02-12 RX ORDER — METOPROLOL TARTRATE 50 MG/1
TABLET, FILM COATED ORAL
Qty: 180 TABLET | Refills: 0 | Status: SHIPPED | OUTPATIENT
Start: 2018-02-12 | End: 2018-05-14

## 2018-02-13 ENCOUNTER — MED REC SCAN ONLY (OUTPATIENT)
Dept: FAMILY MEDICINE CLINIC | Facility: CLINIC | Age: 68
End: 2018-02-13

## 2018-02-13 ENCOUNTER — PRIOR ORIGINAL RECORDS (OUTPATIENT)
Dept: OTHER | Age: 68
End: 2018-02-13

## 2018-02-14 ENCOUNTER — OFFICE VISIT (OUTPATIENT)
Dept: WOUND CARE | Age: 68
End: 2018-02-14
Attending: NURSE PRACTITIONER
Payer: MEDICARE

## 2018-02-14 DIAGNOSIS — E11.59 DIABETES MELLITUS WITH CIRCULATORY COMPLICATION (HCC): Primary | ICD-10-CM

## 2018-02-14 PROCEDURE — 99213 OFFICE O/P EST LOW 20 MIN: CPT

## 2018-02-14 NOTE — PROGRESS NOTES
Chief Complaint  This information was obtained from the patient  Patient is here for a follow up for a diabetic foot ulcer. Patient is just concerned because \"it's not healing. \"     Allergies  NKDA    HPI  This information was obtained from the patient Neurological: Loss of Protective Sensation (bilaterally insensate)  Psychiatric: Nervousness / Tension  Prior Wound History: Other (?? osteo with probe to bone)    Patient denies complaints or symptoms related to:   Constitutional Symptoms (General Health) Wound #1 Left, Lateral Foot is a chronic Bolivar Grade 3 Diabetic Ulcer and has received a status of Not Healed. Subsequent wound encounter measurements are 0.4cm length x 0.6cm width x 0.2cm depth, with an area of 0.24 sq cm and a volume of 0.048 cubic cm. Other: - You may shower as long as your wound/bandage is protected and will not get wet. You can purchase a “cast cover” at most pharmacies to protect your bandages. If your bandages get wet, remove immediately and redress/contact wound center.     Cleansin Increase dietary protein - look for Vimal by [employer] (These are essential branch chain amino acids that help with tissue building and wound healing) and take 2 packets/day.  you can order online at [employer] or [address] [place] [address] or some [employer] carry it or can orde Electronic Signature(s)   Signed By:  Date:    Marion WICK, YO-AP, CFCN, [facility], [address] 02/14/2018 10:24:11        Entered By: Marion Pina on 02/14/2018 10:23:54

## 2018-02-19 LAB
ALBUMIN: 3.9 G/DL
ALKALINE PHOSPHATATE(ALK PHOS): 111 IU/L
BILIRUBIN TOTAL: 1 MG/DL
BUN: 22 MG/DL
CALCIUM: 9.4 MG/DL
CHLORIDE: 107 MEQ/L
CHOLESTEROL, TOTAL: 114 MG/DL
CREATININE, SERUM: 2.15 MG/DL
GLUCOSE: 142 MG/DL
HDL CHOLESTEROL: 38 MG/DL
HEMOGLOBIN A1C: 6.1 %
POTASSIUM, SERUM: 4.5 MEQ/L
PROTEIN, TOTAL: 8.3 G/DL
SGOT (AST): 21 IU/L
SGPT (ALT): 26 IU/L
SODIUM: 138 MEQ/L
TRIGLYCERIDES: 626 MG/DL

## 2018-02-20 ENCOUNTER — TELEPHONE (OUTPATIENT)
Dept: FAMILY MEDICINE CLINIC | Facility: CLINIC | Age: 68
End: 2018-02-20

## 2018-02-20 DIAGNOSIS — H26.9 CATARACT, UNSPECIFIED CATARACT TYPE, UNSPECIFIED LATERALITY: Primary | ICD-10-CM

## 2018-02-20 RX ORDER — PREGABALIN 100 MG/1
100 CAPSULE ORAL 3 TIMES DAILY
Qty: 270 CAPSULE | Refills: 1 | Status: SHIPPED | OUTPATIENT
Start: 2018-02-20 | End: 2018-07-16

## 2018-02-20 NOTE — TELEPHONE ENCOUNTER
Patient will need to be referred to [facility] provider

## 2018-02-20 NOTE — TELEPHONE ENCOUNTER
Patient went in for his eye check up and they found cataracts. Patient needs to have surgery. The clinic is requesting us to send them a referral. He goes to [facility] in [place].  They said they will send us a copy of the report if we'd like one

## 2018-02-20 NOTE — TELEPHONE ENCOUNTER
Lyrica paperwork received and placed in Dr Ignacia Brooks bin for completion  Need to sent printed script with pharmacy name [facility]

## 2018-02-24 ENCOUNTER — HOSPITAL ENCOUNTER (OUTPATIENT)
Dept: CARDIOLOGY CLINIC | Facility: HOSPITAL | Age: 68
End: 2018-02-24
Attending: NURSE PRACTITIONER
Payer: MEDICARE

## 2018-02-28 ENCOUNTER — TELEPHONE (OUTPATIENT)
Dept: FAMILY MEDICINE CLINIC | Facility: CLINIC | Age: 68
End: 2018-02-28

## 2018-02-28 NOTE — TELEPHONE ENCOUNTER
TYLER    PT COMING IN ON MARCH 5TH FOR PRE-OP.    PT HAVING CATARACT SURGERY ON 3/15 W/DR OPPENHEIM    THANK YOU

## 2018-03-01 ENCOUNTER — TELEPHONE (OUTPATIENT)
Dept: FAMILY MEDICINE CLINIC | Facility: CLINIC | Age: 68
End: 2018-03-01

## 2018-03-01 NOTE — TELEPHONE ENCOUNTER
Eye exam note received by fax from [facility]  No retinopathy seen    flowsheet updated  Report sent to scanning

## 2018-03-06 ENCOUNTER — OFFICE VISIT (OUTPATIENT)
Dept: FAMILY MEDICINE CLINIC | Facility: CLINIC | Age: 68
End: 2018-03-06

## 2018-03-06 ENCOUNTER — TELEPHONE (OUTPATIENT)
Dept: FAMILY MEDICINE CLINIC | Facility: CLINIC | Age: 68
End: 2018-03-06

## 2018-03-06 VITALS
SYSTOLIC BLOOD PRESSURE: 132 MMHG | TEMPERATURE: 99 F | RESPIRATION RATE: 14 BRPM | HEART RATE: 74 BPM | BODY MASS INDEX: 31.12 KG/M2 | HEIGHT: 70 IN | OXYGEN SATURATION: 98 % | WEIGHT: 217.38 LBS | DIASTOLIC BLOOD PRESSURE: 84 MMHG

## 2018-03-06 DIAGNOSIS — Z01.818 PRE-OP EXAM: ICD-10-CM

## 2018-03-06 DIAGNOSIS — N18.4 CKD (CHRONIC KIDNEY DISEASE) STAGE 4, GFR 15-29 ML/MIN (HCC): ICD-10-CM

## 2018-03-06 DIAGNOSIS — E13.621 FOOT ULCER DUE TO SECONDARY DM (HCC): ICD-10-CM

## 2018-03-06 DIAGNOSIS — L97.509 FOOT ULCER DUE TO SECONDARY DM (HCC): ICD-10-CM

## 2018-03-06 DIAGNOSIS — H25.9 SENILE CATARACT OF LEFT EYE, UNSPECIFIED AGE-RELATED CATARACT TYPE: Primary | ICD-10-CM

## 2018-03-06 DIAGNOSIS — E11.40 CHRONIC PAINFUL DIABETIC NEUROPATHY (HCC): ICD-10-CM

## 2018-03-06 PROCEDURE — 99214 OFFICE O/P EST MOD 30 MIN: CPT | Performed by: FAMILY MEDICINE

## 2018-03-06 NOTE — H&P
Ethan Llanos is a 79year old male who presents for a pre-operative physical exam. Patient is to have left cataract surgery, to be done by Dr. Sanchez Soliman at [facility]  on 3/15/18. HPI:   Pt complains of nothing new today.  Excited t resection   • Diabetes ([facility]    • Diabetic peripheral neuropathy (HCC)    • Esophageal reflux    • Essential hypertension    • Generalized weakness     uses a cane   • High blood pressure    • High cholesterol    • History of blood transfusion     March/20 History:   Smoking status: Former Smoker                                                              Packs/day: 1.50      Years: 25.00        Quit date: 1/19/2007  Smokeless tobacco: Never Used                      Alcohol use:  Yes              Comment: s Brayden Person is a 79year old male who presents for a pre-operative physical exam. Patient is to have left cataract surgery, to be done by Dr. Peri Bosworth at [facility]  on 3/15/18.   Pt has the following conditions:     Patient Active Pr Chronic painful diabetic neuropathy (hcc) - awaiting lyrica approval   Foot ulcer due to secondary dm (hcc) - follow up with wound care as scheduled this week   Pre-op exam - proceed with surgery.      No orders of the defined types were placed in this en

## 2018-03-06 NOTE — TELEPHONE ENCOUNTER
Please send a copy of patient's H &P and any test results to the center for sleep surgery. Their fax number is [phone]. Call if any questions.

## 2018-03-07 ENCOUNTER — OFFICE VISIT (OUTPATIENT)
Dept: WOUND CARE | Age: 68
End: 2018-03-07
Attending: NURSE PRACTITIONER
Payer: MEDICARE

## 2018-03-07 DIAGNOSIS — E11.621 DIABETIC FOOT ULCER (HCC): ICD-10-CM

## 2018-03-07 DIAGNOSIS — L97.509 DIABETIC FOOT ULCER (HCC): ICD-10-CM

## 2018-03-07 DIAGNOSIS — E11.59 DIABETES MELLITUS WITH CIRCULATORY COMPLICATION (HCC): Primary | ICD-10-CM

## 2018-03-07 PROCEDURE — 99213 OFFICE O/P EST LOW 20 MIN: CPT

## 2018-03-07 NOTE — PROGRESS NOTES
Chief Complaint  This information was obtained from the patient  Patient is here for a wound care follow up. He denies pain, and feels that the wound is the same.     Allergies  NKDA    HPI  This information was obtained from the patient, caregiver and navya Complaints and Symptoms  This information was obtained from the patient, caregiver  Patient complains of:   Eyes: Other (patient scheduled for cataract removal), Glasses / Contacts  Cardiovascular (Central/Peripheral):  Other (htn, hl, valvular disease, lle Integumentary (Hair, Skin)  see wound documentation. Skin and subcutaneous tissue without induration, bogginess, errythema or crepitus . Wound #1 Left, Lateral Foot is a chronic Bolivar Grade 3 Diabetic Ulcer and has received a status of Not Healed.  Sub Other: - You may shower as long as your wound/bandage is protected and will not get wet. You can purchase a “cast cover” at most pharmacies to protect your bandages. If your bandages get wet, remove immediately and redress/contact wound center.     Cleansin Increase dietary protein - look for Vimal by [employer] (These are essential branch chain amino acids that help with tissue building and wound healing) and take 2 packets/day.  you can order online at [employer] or [address] [place] [address] or some [employer] carry it or can orde metatarsal head where there is mild bone marrow edema noted. There is no ed osseous erosive or destructive changes. No cortical permeative change identified. d/w and demonstrated for spouse how to use the leanna collagen and how it works.   Electr

## 2018-03-14 NOTE — ADDENDUM NOTE
Encounter addended by:  RAFAEL Hernandez on: 3/14/2018  9:48 AM<BR>    Actions taken: Delete clinical note

## 2018-03-19 ENCOUNTER — TELEPHONE (OUTPATIENT)
Dept: FAMILY MEDICINE CLINIC | Facility: CLINIC | Age: 68
End: 2018-03-19

## 2018-03-19 NOTE — TELEPHONE ENCOUNTER
Forms received from [employer].   Need patient portion filled out    Left message on voicemail/answering machine for patient to call office   Forms in MD lower bin

## 2018-03-19 NOTE — PROGRESS NOTES
[facility]  TAVR Clinic Note    Subjective:   Patient presents for routine 1 year postop TAVR visit,  accompanied by wife. He underwent a percutaneous RTF TAVR with 26mm S3 valve on 2/2/17.  Since our last visit, he has undergone colonoscopy with poly Outpatient Prescriptions on File Prior to Encounter:  pregabalin (LYRICA) 100 MG Oral Cap Take 1 capsule (100 mg total) by mouth 3 (three) times daily.  Disp: 270 capsule Rfl: 1   METOPROLOL TARTRATE 50 MG Oral Tab TAKE 1 TABLET(50 MG TOTAL) BY MOUTH TWICE ASA    Plan:   1. Complete routine  1 year post TVT KCCQ today, 5 meter walk (16, 15, 16)  2. Routine  1 year echo today  3. Increase acitivity as tolerated  4. F/U with primary cardiologist as directed, Dr. Maggi Thompson  5.   Patient has completed 1 year f/

## 2018-03-20 RX ORDER — AMLODIPINE BESYLATE 10 MG/1
TABLET ORAL
Qty: 30 TABLET | Refills: 5 | Status: SHIPPED | OUTPATIENT
Start: 2018-03-20 | End: 2018-09-17

## 2018-03-20 NOTE — TELEPHONE ENCOUNTER
Patient's wife called back. Explained to her that we received some forms from [employer] [facility] but need them to fill out a portion of it. She said she can stop in tomorrow morning to do that.

## 2018-03-21 ENCOUNTER — MYAURORA ACCOUNT LINK (OUTPATIENT)
Dept: OTHER | Age: 68
End: 2018-03-21

## 2018-03-21 ENCOUNTER — HOSPITAL ENCOUNTER (OUTPATIENT)
Dept: CV DIAGNOSTICS | Facility: HOSPITAL | Age: 68
Discharge: HOME | End: 2018-03-21
Attending: INTERNAL MEDICINE

## 2018-03-21 ENCOUNTER — HOSPITAL ENCOUNTER (OUTPATIENT)
Dept: CARDIOLOGY CLINIC | Facility: HOSPITAL | Age: 68
Discharge: HOME | End: 2018-03-21
Attending: NURSE PRACTITIONER
Payer: MEDICARE

## 2018-03-21 VITALS
DIASTOLIC BLOOD PRESSURE: 63 MMHG | RESPIRATION RATE: 18 BRPM | SYSTOLIC BLOOD PRESSURE: 147 MMHG | TEMPERATURE: 98 F | OXYGEN SATURATION: 97 % | HEART RATE: 62 BPM

## 2018-03-21 DIAGNOSIS — Z95.2 S/P TAVR (TRANSCATHETER AORTIC VALVE REPLACEMENT): ICD-10-CM

## 2018-03-21 PROCEDURE — 99214 OFFICE O/P EST MOD 30 MIN: CPT

## 2018-03-21 RX ORDER — FENOFIBRATE 48 MG/1
67 TABLET, COATED ORAL DAILY
COMMUNITY
End: 2019-04-02

## 2018-03-22 ENCOUNTER — TELEPHONE (OUTPATIENT)
Dept: FAMILY MEDICINE CLINIC | Facility: CLINIC | Age: 68
End: 2018-03-22

## 2018-03-22 ENCOUNTER — TELEPHONE (OUTPATIENT)
Dept: CARDIOLOGY CLINIC | Facility: HOSPITAL | Age: 68
End: 2018-03-22

## 2018-03-22 NOTE — TELEPHONE ENCOUNTER
Wife dropped off lab bill and is requesting a call back. Called and left message for patient or wife to call back.

## 2018-03-22 NOTE — PROGRESS NOTES
Called patient with results of echo yesterday and Left Message. Patient's EF remains at 70-75%, aortic valve is functioning normally with no rocking motion. Mean gradient is 21 mmHg, trivial regurgitation.   Overall, there has been no significant interval c

## 2018-03-23 NOTE — TELEPHONE ENCOUNTER
I addended the office visit that those were ordered at. They were not linked with any dx by mistake. I changed this. That should help, they should be covered under those dx. Thanks.

## 2018-03-23 NOTE — TELEPHONE ENCOUNTER
Gail, [facility] Dona Salgado Nurse   Caller: Rajeev Owen - spouse (Today,  8:39 AM)             Kulwinder Aguila is returning a nurse call from yesterday. Please call Kulwinder Aguila back at [phone].       Left message on voicemail/answering machine for patient wife to luis daniel

## 2018-03-23 NOTE — TELEPHONE ENCOUNTER
Spoke with wife who states insurance would not cover the lab work due to the diagnoses provided.   A1c, TSH and Ft4 done,  Dx E11.42, R53.82

## 2018-03-26 NOTE — TELEPHONE ENCOUNTER
Left message on voicemail/answering machine for patient wife to call office.     Need to let wife or patient know billing issue has been taken care of and [facility] will resubmit to insurance

## 2018-03-27 RX ORDER — PANTOPRAZOLE SODIUM 40 MG/1
TABLET, DELAYED RELEASE ORAL
Qty: 90 TABLET | Refills: 3 | Status: SHIPPED | OUTPATIENT
Start: 2018-03-27 | End: 2019-03-18

## 2018-03-27 NOTE — TELEPHONE ENCOUNTER
LOV: 3/6/18  Last Refill: 3/29/17  #90 3 RF    Future Appointments  Date Time Provider Alexei Snow   4/4/2018 9:00 AM DARREN [id number] PF WOUNDCARE [place]   5/8/2018 1:15 PM Horace Batista MD PF HEM ONC [place]

## 2018-03-28 NOTE — TELEPHONE ENCOUNTER
----- Message from Darwin Martinez sent at 3/28/2018 10:30 AM CDT -----  Contact: spouse  Pt's spouse called back, Please return call to [phone]

## 2018-03-28 NOTE — TELEPHONE ENCOUNTER
Call back to patient's wife. Advised that billing issues taken care of and [facility] will resubmit to insurance. Patient's wife verbalized understanding. No further questions.

## 2018-04-04 ENCOUNTER — TELEPHONE (OUTPATIENT)
Dept: FAMILY MEDICINE CLINIC | Facility: CLINIC | Age: 68
End: 2018-04-04

## 2018-04-04 ENCOUNTER — OFFICE VISIT (OUTPATIENT)
Dept: WOUND CARE | Age: 68
End: 2018-04-04
Attending: NURSE PRACTITIONER
Payer: MEDICARE

## 2018-04-04 DIAGNOSIS — L97.509 DIABETIC FOOT ULCER (HCC): ICD-10-CM

## 2018-04-04 DIAGNOSIS — E11.621 DIABETIC FOOT ULCER (HCC): ICD-10-CM

## 2018-04-04 DIAGNOSIS — L97.509 ULCER OF FOOT DUE TO SECONDARY DIABETES MELLITUS (HCC): Primary | ICD-10-CM

## 2018-04-04 DIAGNOSIS — E11.59 DIABETES MELLITUS WITH CIRCULATORY COMPLICATION (HCC): Primary | ICD-10-CM

## 2018-04-04 DIAGNOSIS — E13.621 ULCER OF FOOT DUE TO SECONDARY DIABETES MELLITUS (HCC): Primary | ICD-10-CM

## 2018-04-04 PROCEDURE — 99213 OFFICE O/P EST LOW 20 MIN: CPT

## 2018-04-04 NOTE — TELEPHONE ENCOUNTER
Pt is seeing wound dr for wound on left foot- [address] [place] [address] in [facility].      Referral placed today

## 2018-04-04 NOTE — TELEPHONE ENCOUNTER
Pt has an appt with the [facility] wound doctor today and 8:30. They called and left a message last night that pt is going to need a referral for this appointment of his insurance will not cover it. Please send an order.

## 2018-04-04 NOTE — PROGRESS NOTES
Chief Complaint  This information was obtained from the patient  Patient is here for a follow up of the left foot. Patient states there was a scab, but it came right off.      Allergies  NKDA    HPI  This information was obtained from the patient, caregiver 4-4-18 Patient returns today with spouse, she has been dressing with leanna, there is a scab today that falls off with cleansing. the wound is essentially the same, remains pink granulation, no s/s of infection, the tissue appears dry.     Complaints and Sy dp weakly palpable left. lle Extremity free of varicosities, + scant nonpitting edema. Capillary refill < 3 seconds. Digits are warm. toenails are wnl for color, thickness and hygeine. no hairgrowth on leg and foot. some dependent rubor noted.      Musculos (Encounter Diagnosis) Z79.84 - Long term (current) use of oral hypoglycemic drugs        Plan    Wound Orders:  Wound #1 Left, Lateral Foot     Topicals: Other: - You may shower as long as your wound/bandage is protected and will not get wet.  You can purc Misc/Additional Orders  Supplement with a daily multivitamin. Increase dietary protein - look for Vimal by [employer] (These are essential branch chain amino acids that help with tissue building and wound healing) and take 2 packets/day.  you can order on metatarsal head where there is mild bone marrow edema noted. There is no ed osseous erosive or destructive changes. No cortical permeative change identified. wound is dry. will utlize xeroform every other day.  continue offloading  Electronic Sign

## 2018-04-12 ENCOUNTER — MED REC SCAN ONLY (OUTPATIENT)
Dept: FAMILY MEDICINE CLINIC | Facility: CLINIC | Age: 68
End: 2018-04-12

## 2018-04-16 DIAGNOSIS — E11.42 TYPE 2 DIABETES MELLITUS WITH DIABETIC POLYNEUROPATHY, WITHOUT LONG-TERM CURRENT USE OF INSULIN (HCC): ICD-10-CM

## 2018-04-16 RX ORDER — GLIPIZIDE 5 MG/1
TABLET, FILM COATED, EXTENDED RELEASE ORAL
Qty: 90 TABLET | Refills: 1 | Status: SHIPPED | OUTPATIENT
Start: 2018-04-16 | End: 2018-10-15

## 2018-04-23 ENCOUNTER — TELEPHONE (OUTPATIENT)
Dept: FAMILY MEDICINE CLINIC | Facility: CLINIC | Age: 68
End: 2018-04-23

## 2018-04-25 ENCOUNTER — OFFICE VISIT (OUTPATIENT)
Dept: WOUND CARE | Age: 68
End: 2018-04-25
Attending: NURSE PRACTITIONER
Payer: MEDICARE

## 2018-04-25 DIAGNOSIS — L97.509 DIABETIC FOOT ULCER (HCC): ICD-10-CM

## 2018-04-25 DIAGNOSIS — L97.524 ULCER OF TOE OF LEFT FOOT, WITH NECROSIS OF BONE (HCC): Primary | ICD-10-CM

## 2018-04-25 DIAGNOSIS — E11.59 DIABETES MELLITUS WITH CIRCULATORY COMPLICATION (HCC): ICD-10-CM

## 2018-04-25 DIAGNOSIS — E11.621 DIABETIC FOOT ULCER (HCC): ICD-10-CM

## 2018-04-25 PROCEDURE — 87070 CULTURE OTHR SPECIMN AEROBIC: CPT

## 2018-04-25 PROCEDURE — 87205 SMEAR GRAM STAIN: CPT

## 2018-04-25 PROCEDURE — 87186 SC STD MICRODIL/AGAR DIL: CPT

## 2018-04-25 PROCEDURE — 87147 CULTURE TYPE IMMUNOLOGIC: CPT

## 2018-04-25 PROCEDURE — 11042 DBRDMT SUBQ TIS 1ST 20SQCM/<: CPT

## 2018-04-25 NOTE — PROGRESS NOTES
Chief Complaint  This information was obtained from the patient  Patient is here for a follow up of the left foot. Patient states he feels like the wound is bleeding more and it is not doing as well with the dressings being used.  He also complained of swel 4-25-18 patient returns today with spouse, they report that the wound has been bleeding more as well as increased edema around the wound over the last week. today there is malodor (this is new per spouse) and the wound has deteriorated.  patient denies any bp elevated recheck improved, patient denies symptoms of headache, dizziness, sob, or vision changes, instructed to check at home. will continue to monitor. Pulse Regular and wnl for patient. [place] Respirations easy and unlabored. Temperature wnl.  Weight normal maximum depth at 1:00    Psychiatric:  judgement and insight is intact, however assistance by family for medical decision-making. Alert and oriented times 3. patient with flat affect, but cooperative with exam and answers questions appropriately.     [place] Notice Wash your hands with soap and water. Remove soiled dressing, discard into plastic bag and place into trash. Cleanse the wound with Saline/wound cleanser as directed using gauze sponges. Pat dry. Apply clean dressing as directed    Additional Orders:     Wou Vitamin A: Dark green, leafy vegetables, orange or yellow vegetables, cantaloupe, fortified dairy products, liver, fortified cereals  Zinc: Fortified cereals, red meats, seafood    S/S of Infection  Non-adherence    Care summary  Reviewed and evaluated lab Karina WICK, YO-AP, CF, [address] [place] [address],[address], [facility] 04/25/2018 33:92:86        Entered By: Karina Ledesma on 04/25/2018 09:26:10

## 2018-04-26 ENCOUNTER — TELEPHONE (OUTPATIENT)
Dept: FAMILY MEDICINE CLINIC | Facility: CLINIC | Age: 68
End: 2018-04-26

## 2018-04-26 DIAGNOSIS — L97.524 ULCER OF TOE OF LEFT FOOT, WITH NECROSIS OF BONE (HCC): Primary | ICD-10-CM

## 2018-04-26 NOTE — TELEPHONE ENCOUNTER
Patient notified via detailed voicemail left at cell number (ok per  HIPAA consent) that referral has been placed

## 2018-04-26 NOTE — TELEPHONE ENCOUNTER
Patient is going to the [address] [employer] in [place]. He was there yesterday and they told him that he needed more visits authorized.

## 2018-05-02 ENCOUNTER — OFFICE VISIT (OUTPATIENT)
Dept: WOUND CARE | Age: 68
End: 2018-05-02
Attending: NURSE PRACTITIONER
Payer: MEDICARE

## 2018-05-02 DIAGNOSIS — L97.524 ULCER OF TOE OF LEFT FOOT, WITH NECROSIS OF BONE (HCC): Primary | ICD-10-CM

## 2018-05-02 DIAGNOSIS — E11.621 DIABETIC FOOT ULCER (HCC): ICD-10-CM

## 2018-05-02 DIAGNOSIS — L97.509 DIABETIC FOOT ULCER (HCC): ICD-10-CM

## 2018-05-02 DIAGNOSIS — E11.59 DIABETES MELLITUS WITH CIRCULATORY COMPLICATION (HCC): ICD-10-CM

## 2018-05-02 PROCEDURE — 99213 OFFICE O/P EST LOW 20 MIN: CPT

## 2018-05-02 NOTE — PROGRESS NOTES
Chief Complaint  This information was obtained from the patient  Patient is here for a follow up of the left foot. Patients wife states she has been changing the dressings as told to do so.      Allergies  NKDA    HPI  This information was obtained from the 5-2-18 patient returns today with spouse, patient feels the wound is doing better, spouse reports she is doing the gent gtts tid as directed. the wound has improved.  patient also reports his bs are becoming less erratic over the last week, \"except when I bp wnl for patient. Pulse Regular and wnl for patient. Zak Fatima Respirations easy and unlabored. Temperature wnl. Weight normal for height. . Appearance neat and clean. Appears in no acute distress. Well nourished and well developed.  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  If after regular business hours, please call your family doctor or Osteomyelitis suspected due to: - MRI results 11-30-17: There is a cutaneous wound along the lateral margin of the fifth metatarsal head that measures approximately 8 mm in greatest dimension.  There is secondary soft tissue edema/cellulitis extending from

## 2018-05-08 ENCOUNTER — OFFICE VISIT (OUTPATIENT)
Dept: HEMATOLOGY/ONCOLOGY | Age: 68
End: 2018-05-08
Attending: INTERNAL MEDICINE
Payer: MEDICARE

## 2018-05-08 VITALS
OXYGEN SATURATION: 97 % | DIASTOLIC BLOOD PRESSURE: 70 MMHG | WEIGHT: 216.63 LBS | HEART RATE: 73 BPM | BODY MASS INDEX: 31 KG/M2 | SYSTOLIC BLOOD PRESSURE: 154 MMHG | RESPIRATION RATE: 20 BRPM | TEMPERATURE: 98 F

## 2018-05-08 DIAGNOSIS — C18.7 CANCER OF SIGMOID COLON (HCC): ICD-10-CM

## 2018-05-08 DIAGNOSIS — D69.6 THROMBOCYTOPENIA (HCC): Primary | ICD-10-CM

## 2018-05-08 DIAGNOSIS — R91.1 LUNG NODULE: ICD-10-CM

## 2018-05-08 PROCEDURE — 99213 OFFICE O/P EST LOW 20 MIN: CPT | Performed by: INTERNAL MEDICINE

## 2018-05-08 NOTE — PROGRESS NOTES
[facility] Progress Note    Patient Name: Katie Rahman   YOB: 1950   Medical Record Number: [id number]   CSN: [id number]   Attending Physician: Laz Faye M.D.    Referring Physician: Shanda Santos DO      Date of Visit: 5/8/2018 imaging)    History of Present Illness:   Doing fair but with some fatigue. Says has a non-healing ulcer in his left foot and underwent angioplasty he says to improve circulation. Continues with diarrhea since he had his colon surgery.  Takes an imodium as colon resection   • Diabetes ([facility] [address]    • Diabetic peripheral neuropathy (HCC)    • Esophageal reflux    • Essential hypertension    • Generalized weakness     uses a cane   • High blood pressure    • High cholesterol    • History of blood transfusion     Ma Disorder Brother 67     MI   • Diabetes Neg        Psychosocial History:    Social History  Social History   Marital status:   Spouse name: N/A    Years of education: N/A  Number of children: 2     Occupational History  None on file     Social Histo 63 U/L   Bilirubin, Total 0.7 0.1 - 2.0 mg/dL   Total Protein 7.7 6.1 - 8.3 g/dL   Albumin 3.8 3.5 - 4.8 g/dL   Sodium 138 136 - 144 mmol/L   Potassium 4.2 3.6 - 5.1 mmol/L   Chloride 110 101 - 111 mmol/L   CO2 22.0 22.0 - 32.0 mmol/L   -CBC W/ DIFFERENTIA

## 2018-05-08 NOTE — PROGRESS NOTES
Pt here for 6 month MD f/u. Energy level has been lower the last month or so. Appetite is good. Denies pain. Pt has no further complaints.      Education Record    Learner:  Patient    Disease / Diagnosis:    Barriers / Limitations:  None   Comments:    Met

## 2018-05-14 DIAGNOSIS — I10 ESSENTIAL HYPERTENSION: ICD-10-CM

## 2018-05-14 RX ORDER — METOPROLOL TARTRATE 50 MG/1
TABLET, FILM COATED ORAL
Qty: 180 TABLET | Refills: 0 | Status: SHIPPED | OUTPATIENT
Start: 2018-05-14 | End: 2018-08-07

## 2018-05-14 NOTE — TELEPHONE ENCOUNTER
Last refilled on 2/12/18 for # 180 with 0 rf. Last labs 5/8/18. Last seen on 3/6/18. /84. Future Appointments  Date Time Provider Alexei Snow   5/23/2018 9:00 AM PF [id number] PF WOUNDCARE [place]        Thank you.

## 2018-05-23 ENCOUNTER — PATIENT OUTREACH (OUTPATIENT)
Dept: FAMILY MEDICINE CLINIC | Facility: CLINIC | Age: 68
End: 2018-05-23

## 2018-05-23 ENCOUNTER — OFFICE VISIT (OUTPATIENT)
Dept: WOUND CARE | Age: 68
End: 2018-05-23
Attending: NURSE PRACTITIONER
Payer: MEDICARE

## 2018-05-23 DIAGNOSIS — L97.509 DIABETIC FOOT ULCER (HCC): ICD-10-CM

## 2018-05-23 DIAGNOSIS — E11.621 DIABETIC FOOT ULCER (HCC): ICD-10-CM

## 2018-05-23 DIAGNOSIS — E11.59 DIABETES MELLITUS WITH CIRCULATORY COMPLICATION (HCC): Primary | ICD-10-CM

## 2018-05-23 DIAGNOSIS — L97.524 ULCER OF TOE OF LEFT FOOT, WITH NECROSIS OF BONE (HCC): ICD-10-CM

## 2018-05-23 PROCEDURE — 99213 OFFICE O/P EST LOW 20 MIN: CPT

## 2018-05-23 NOTE — PROGRESS NOTES
Chief Complaint  This information was obtained from the patient  Patient is here for a follow up of the left foot. Patient reports that the adaptic was irritating his skin and they discontinued using it.      Allergies  NKDA    HPI  This information was obt Cardiovascular (Central/Peripheral):  Other (htn, hl, valvular disease, lle baloon intervention), Edema  Genitourinary (): Other (ckd stage 3)  Integumentary (Hair/Skin/Nails): Ulcers  Neurological: Loss of Protective Sensation (bilaterally insensate)  Ps Wound #1 Left, Lateral Foot is a chronic Bolivar Grade 3 Diabetic Ulcer and has received a status of Bridged. Subsequent wound encounter measurements are 0.2cm length x 0.4cm width with no measurable depth, with an area of 0.08 sq cm .  No tunneling has been Wash your hands with soap and water. Remove soiled dressing, discard into plastic bag and place into trash. Cleanse the wound with Saline/wound cleanser as directed using gauze sponges. Pat dry. Apply clean dressing as directed    Additional Orders:     Wou Vitamin A: Dark green, leafy vegetables, orange or yellow vegetables, cantaloupe, fortified dairy products, liver, fortified cereals  Zinc: Fortified cereals, red meats, seafood    S/S of Infection  Non-adherence    Care summary  Reviewed and evaluated lab

## 2018-05-29 ENCOUNTER — TELEPHONE (OUTPATIENT)
Dept: FAMILY MEDICINE CLINIC | Facility: CLINIC | Age: 68
End: 2018-05-29

## 2018-05-29 NOTE — TELEPHONE ENCOUNTER
SPOUSE CALLED TO ADV THAT PT RECEIVED LETTER THAT PT IS DUE FOR A1C. PT GOING  TO [facility] IN [place] TOMORROW.     PLEASE FAX LAB ORDER OVER AND PT WILL GET THAT DONE    THANK YOU

## 2018-05-29 NOTE — TELEPHONE ENCOUNTER
A1c last done 1/22/18, was to repeat in 3 months. Spoke with Lyudmila Kim who states patient will be having labs drawn at [facility] on [address] in [place]. Called [facility] and obtained fax number.   Lab order faxed to [facility] at [phone]

## 2018-05-30 ENCOUNTER — PRIOR ORIGINAL RECORDS (OUTPATIENT)
Dept: OTHER | Age: 68
End: 2018-05-30

## 2018-05-31 DIAGNOSIS — E11.42 TYPE 2 DIABETES MELLITUS WITH DIABETIC POLYNEUROPATHY, WITHOUT LONG-TERM CURRENT USE OF INSULIN (HCC): Primary | ICD-10-CM

## 2018-06-05 ENCOUNTER — PRIOR ORIGINAL RECORDS (OUTPATIENT)
Dept: OTHER | Age: 68
End: 2018-06-05

## 2018-06-09 ENCOUNTER — HOSPITAL ENCOUNTER (OUTPATIENT)
Dept: CT IMAGING | Age: 68
Discharge: HOME | End: 2018-06-09
Attending: INTERNAL MEDICINE
Payer: MEDICARE

## 2018-06-09 DIAGNOSIS — C18.7 CANCER OF SIGMOID COLON (HCC): ICD-10-CM

## 2018-06-09 PROCEDURE — 71250 CT THORAX DX C-: CPT | Performed by: INTERNAL MEDICINE

## 2018-06-09 PROCEDURE — 74176 CT ABD & PELVIS W/O CONTRAST: CPT | Performed by: INTERNAL MEDICINE

## 2018-06-13 ENCOUNTER — OFFICE VISIT (OUTPATIENT)
Dept: WOUND CARE | Age: 68
End: 2018-06-13
Attending: NURSE PRACTITIONER
Payer: MEDICARE

## 2018-06-13 DIAGNOSIS — E11.59 DIABETES MELLITUS WITH CIRCULATORY COMPLICATION (HCC): Primary | ICD-10-CM

## 2018-06-13 DIAGNOSIS — L97.524 ULCER OF TOE OF LEFT FOOT, WITH NECROSIS OF BONE (HCC): ICD-10-CM

## 2018-06-13 PROCEDURE — 11042 DBRDMT SUBQ TIS 1ST 20SQCM/<: CPT

## 2018-06-13 NOTE — PROGRESS NOTES
Chief Complaint  This information was obtained from the patient  Patient is here for a wound care follow up. He denies any pain. Patient states that he has not worn his Darco shoe in over a month as it is uncomfortable.     Allergies  NKDA    HPI  This info Cardiovascular (Central/Peripheral):  Other (htn, hl, valvular disease, lle baloon intervention), Edema  Genitourinary (): Other (ckd stage 3)  Integumentary (Hair/Skin/Nails): Ulcers  Neurological: Loss of Protective Sensation (bilaterally insensate)  Ps Wound #1 Left, Lateral Foot is a chronic Bolivar Grade 3 Diabetic Ulcer and has received a status of Bridged. Subsequent wound encounter measurements are 0.3cm length x 0.5cm width x 0.2cm depth, with an area of 0.15 sq cm and a volume of 0.03 cubic cm.  No Wound #1 (Diabetic Ulcer) is located on the left, lateral foot. A skin/subcutaneous tissue level excisional/surgical debridement with a total area debrided of 0.7 sq cm was performed by Edith Fish NP.  Subcutaneous was removed along with devitalized ti Increase dietary protein - look for Vimal by [employer] (These are essential branch chain amino acids that help with tissue building and wound healing) and take 2 packets/day.  you can order online at [employer] or [address] [place] [address] or some [employer] carry it or can orde metatarsal head where there is mild bone marrow edema noted. There is no ed osseous erosive or destructive changes. No cortical permeative change identified. discussed and stressed to patient the importance of offloading to heal this wound.  hoang

## 2018-06-20 ENCOUNTER — OFFICE VISIT (OUTPATIENT)
Dept: WOUND CARE | Age: 68
End: 2018-06-20
Attending: NURSE PRACTITIONER
Payer: MEDICARE

## 2018-06-20 DIAGNOSIS — E11.59 DIABETES MELLITUS WITH CIRCULATORY COMPLICATION (HCC): Primary | ICD-10-CM

## 2018-06-20 DIAGNOSIS — L97.524 ULCER OF TOE OF LEFT FOOT, WITH NECROSIS OF BONE (HCC): ICD-10-CM

## 2018-06-20 DIAGNOSIS — E11.621 DIABETIC FOOT ULCER (HCC): ICD-10-CM

## 2018-06-20 DIAGNOSIS — L97.509 DIABETIC FOOT ULCER (HCC): ICD-10-CM

## 2018-06-20 PROCEDURE — 99213 OFFICE O/P EST LOW 20 MIN: CPT

## 2018-06-20 NOTE — PROGRESS NOTES
Chief Complaint  This information was obtained from the patient  Patient is here for a follow up visit for wound to the left foot. Patient arrives wearing his Darco shoe.     Allergies  NKDA    HPI  This information was obtained from the patient, caregiver 6-20-18 patient returns today with spouse, he is wearing his darco shoe but states he is having difficulty walking with it. is using a cane.  we discussed other offloading options again today including felted foam but patient is not able to spend 30$ to pur bp elevated recheck improved, patient denies symptoms of headache, dizziness, sob, or vision changes, instructed to check at home. will continue to monitor. Pulse Regular and wnl for patient. Susana Pencil Respirations easy and unlabored. Temperature wnl. elevated BMI. judgement and insight is intact, however assistance by family for medical decision-making. Alert and oriented times 3. No evidence of depression, anxiety, or agitation. Calm, cooperative, and communicative. Appropriate interactions and affect.     Additiona Vitamin C: Citrus fruits and juices, strawberries, tomatoes, tomato juice, peppers, baked potatoes, spinach, broccoli, cauliflower, [place] sprouts, cabbage  Vitamin A: Dark green, leafy vegetables, orange or yellow vegetables, cantaloupe, fortified dairy Ronda WICK, YO-AP, [facility], [facility], [facility] 06/20/2018 10:26:55        Entered By: Ronda Bocanegra on 06/20/2018 10:26:37

## 2018-06-22 LAB
ALT (SGPT): 22 U/L
AST (SGOT): 20 U/L
CHOLESTEROL, TOTAL: 119 MG/DL
GLUCOSE: 169 MG/DL
HDL CHOLESTEROL: 33 MG/DL
TRIGLYCERIDES: 570 MG/DL

## 2018-06-27 ENCOUNTER — OFFICE VISIT (OUTPATIENT)
Dept: WOUND CARE | Age: 68
End: 2018-06-27
Attending: NURSE PRACTITIONER
Payer: MEDICARE

## 2018-06-27 DIAGNOSIS — E11.59 DIABETES MELLITUS WITH CIRCULATORY COMPLICATION (HCC): Primary | ICD-10-CM

## 2018-06-27 DIAGNOSIS — L97.524 ULCER OF TOE OF LEFT FOOT, WITH NECROSIS OF BONE (HCC): ICD-10-CM

## 2018-06-27 PROCEDURE — 97597 DBRDMT OPN WND 1ST 20 CM/<: CPT

## 2018-06-27 NOTE — PROGRESS NOTES
Chief Complaint  This information was obtained from the patient  Patient is here for a follow up, left foot wound. He denies any pain to the wound. Allergies  NKDA    HPI  This information was obtained from the patient, caregiver and chart.   11-22-17 IN 6-20-18 patient returns today with spouse, he is wearing his darco shoe but states he is having difficulty walking with it. is using a cane.  we discussed other offloading options again today including felted foam but patient is not able to spend 30$ to pur bp wnl for patient. Pulse Regular and wnl for patient. Sanket Fide Respirations easy and unlabored. Temperature wnl. elevated BMI. Appearance neat and clean. Appears in no acute distress. Well nourished and well developed.  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  you can order online at [employer] or [address] [place] [address] or some [employer] carry it or can orde metatarsal head where there is mild bone marrow edema noted. There is no ed osseous erosive or destructive changes. No cortical permeative change identified.         I did adapt a slipper or him to offload the lateral 5th met head on lateral, dorsal and

## 2018-07-11 ENCOUNTER — OFFICE VISIT (OUTPATIENT)
Dept: WOUND CARE | Age: 68
End: 2018-07-11
Attending: NURSE PRACTITIONER
Payer: MEDICARE

## 2018-07-11 DIAGNOSIS — E11.59 DIABETES MELLITUS WITH CIRCULATORY COMPLICATION (HCC): Primary | ICD-10-CM

## 2018-07-11 DIAGNOSIS — L97.524 ULCER OF TOE OF LEFT FOOT, WITH NECROSIS OF BONE (HCC): ICD-10-CM

## 2018-07-11 PROCEDURE — 11042 DBRDMT SUBQ TIS 1ST 20SQCM/<: CPT

## 2018-07-11 PROCEDURE — 29445 APPL RIGID TOT CNTC LEG CAST: CPT

## 2018-07-11 NOTE — PROGRESS NOTES
Subjective    Chief Complaint  This information was obtained from the patient  Patient is here for a follow up, left foot wound. He denies any pain to the wound.     Allergies  NKDA    HPI  This information was obtained from the patient, caregiver and chart Integumentary (Hair/Skin/Nails): Calluses/Corns, Ulcers  Neurological: Loss of Protective Sensation (bilaterally insensate)  Psychiatric: Nervousness / Tension  Prior Wound History: Other (?? osteo with probe to bone)    Patient denies complaints or sympto Wound #1 Left, Lateral Foot is a chronic Bolivar Grade 3 Diabetic Ulcer and has received a status of Bridged. Subsequent wound encounter measurements are 0.5cm length x 0.8cm width x 0.2cm depth, with an area of 0.4 sq cm and a volume of 0.08 cubic cm.  No t Wound #1 (Diabetic Ulcer) is located on the left, lateral foot. A skin/subcutaneous tissue level excisional/surgical debridement with a total area debrided of 0.84 sq cm was performed by Haroldo Sabillon NP.  Subcutaneous was removed along with devitalized t Protein: Meats, beans, eggs, milk and yogurt particularly [place] yogurt), tofu, soy nuts, soy protein products  Vitamin C: Citrus fruits and juices, strawberries, tomatoes, tomato juice, peppers, baked potatoes, spinach, broccoli, cauliflower, Oklee spro I d/w patient and spouse that without strict offloading the chances of this ulcer resolving is nill.  I discussed that we are needing to be cognizant of the re-vasc that was done by dr. Tania Boo and that at any point that could \"shut down\" again his arteri

## 2018-07-13 ENCOUNTER — OFFICE VISIT (OUTPATIENT)
Dept: WOUND CARE | Age: 68
End: 2018-07-13
Attending: NURSE PRACTITIONER
Payer: MEDICARE

## 2018-07-13 DIAGNOSIS — L97.524 ULCER OF TOE OF LEFT FOOT, WITH NECROSIS OF BONE (HCC): ICD-10-CM

## 2018-07-13 DIAGNOSIS — E11.59 DIABETES MELLITUS WITH CIRCULATORY COMPLICATION (HCC): Primary | ICD-10-CM

## 2018-07-13 PROCEDURE — 29445 APPL RIGID TOT CNTC LEG CAST: CPT

## 2018-07-16 NOTE — TELEPHONE ENCOUNTER
Jacob Ferguson Nurse   Caller: Spouse (Today,  1:00 PM)             Spouse called and LVM stating that the Lyrica is with [facility]. Please call back if anymore questions.       Script has been faxed to [facility]

## 2018-07-16 NOTE — TELEPHONE ENCOUNTER
Last refilled on 2/20/18 for # 270 with 1 refills  Last seen on 3/6/18  Future Appointments  Date Time Provider Alexei Snow   7/18/2018 9:00 AM DARREN [id number] PF WOUNDCARE [place]   11/20/2018 1:00 PM MD DARREN Lam HEM ONC [place]

## 2018-07-16 NOTE — TELEPHONE ENCOUNTER
Patient has been using lyrica assistance program in the past.  [facility] is requesting the script.     Left message on voicemail asking patient to call back and clarify which pharmacy to send script, the [facility] or the assistance program

## 2018-07-18 ENCOUNTER — OFFICE VISIT (OUTPATIENT)
Dept: WOUND CARE | Age: 68
End: 2018-07-18
Attending: NURSE PRACTITIONER
Payer: MEDICARE

## 2018-07-18 DIAGNOSIS — E11.59 DIABETES MELLITUS WITH CIRCULATORY COMPLICATION (HCC): Primary | ICD-10-CM

## 2018-07-18 DIAGNOSIS — L97.524 ULCER OF TOE OF LEFT FOOT, WITH NECROSIS OF BONE (HCC): ICD-10-CM

## 2018-07-18 PROCEDURE — 29445 APPL RIGID TOT CNTC LEG CAST: CPT

## 2018-07-18 NOTE — PROGRESS NOTES
Subjective    Chief Complaint  This information was obtained from the patient  Patient is here for a follow up of the left foot. Patient states the cast that was put on this last visit was the \"best it's ever been. \"     Allergies  NKDA    HPI  This infor Patient complains of:   Eyes: Glasses / Contacts  Cardiovascular (Central/Peripheral):  Other (htn, hl, valvular disease, lle baloon intervention), Edema (controlled and stable)  Genitourinary (): Other (ckd stage 3)  Integumentary (Hair/Skin/Nails): Call Wound #1 Left, Lateral Foot is a chronic Bolivar Grade 3 Diabetic Ulcer and has received a status of Bridged. Subsequent wound encounter measurements are 0.5cm length x 0.6cm width x 0.1cm depth, with an area of 0.3 sq cm and a volume of 0.03 cubic cm.  No t May not shower.  - Cleanse limb, foot, between toes with soap and water with each cast change  Cleanse with Vashe - soak with vashe with each TCC change  Silver alginate - declan Valdez: BRING THIS DRESSING WITH YOU TO EACH APPOINTMENT  Missael/Super Perfusion assessed by palpation of pulses.  - weak dp left  Arterial/Vascular consult: - [facility] - dr Juli Davila.  balloon intervention 1-5-18  Last sharp debridement date: - 7-11-18  Last A1C date and value: - January 2018 a1c 6.1  October 2017 a1c 5.8  Last albu

## 2018-07-23 ENCOUNTER — OFFICE VISIT (OUTPATIENT)
Dept: WOUND CARE | Facility: HOSPITAL | Age: 68
End: 2018-07-23
Attending: NURSE PRACTITIONER
Payer: MEDICARE

## 2018-07-23 DIAGNOSIS — L97.524 ULCER OF TOE OF LEFT FOOT, WITH NECROSIS OF BONE (HCC): ICD-10-CM

## 2018-07-23 DIAGNOSIS — E11.59 DIABETES MELLITUS WITH CIRCULATORY COMPLICATION (HCC): Primary | ICD-10-CM

## 2018-07-23 PROCEDURE — 29445 APPL RIGID TOT CNTC LEG CAST: CPT

## 2018-07-23 NOTE — PROGRESS NOTES
Subjective    Chief Complaint  This information was obtained from the patient  Patient is here for a follow up of the left foot wound    Allergies  NKDA    HPI  This information was obtained from the patient, caregiver and chart.   11-22-17 INITIAL:  elle 7/23/18: Pt is here for follow up, states he has been sitting and elevating leg most of the day. He has limited walking as instructed. He is also tolerating the TCC well. Feels last application was a little tight but no pain.  Pt brought his dressing as ins Normal respiratory effort, without use of accessory muscles. Cardiovascular:  +1 DP and PT. No LE pain, mild edema, Capillary refill < 3 seconds. Digits are warm. No hairgrowth on legs and feet. .     Musculoskeletal:  unsteady-uses walker for stability Wound Cleansing & Dressings  May not shower.  - Cleanse limb, foot, between toes with soap and water with each cast change  Cleanse with Vashe - soak with vashe with each TCC change  Silver alginate - declan Valdez: BRING THIS DRESSING WITH YOU TO EACH Perfusion assessed by palpation of pulses.  - weak dp left  Arterial/Vascular consult: - [facility] - dr Sunny Fernandez.  balloon intervention 1-5-18  Last sharp debridement date: - 7-11-18  Last A1C date and value: - January 2018 a1c 6.1  October 2017 a1c 5.8  Last albu

## 2018-07-24 ENCOUNTER — TELEPHONE (OUTPATIENT)
Dept: FAMILY MEDICINE CLINIC | Facility: CLINIC | Age: 68
End: 2018-07-24

## 2018-07-24 DIAGNOSIS — E13.621 FOOT ULCER DUE TO SECONDARY DM (HCC): Primary | ICD-10-CM

## 2018-07-24 DIAGNOSIS — L97.509 FOOT ULCER DUE TO SECONDARY DM (HCC): Primary | ICD-10-CM

## 2018-07-30 ENCOUNTER — OFFICE VISIT (OUTPATIENT)
Dept: WOUND CARE | Facility: HOSPITAL | Age: 68
End: 2018-07-30
Attending: NURSE PRACTITIONER
Payer: MEDICARE

## 2018-07-30 DIAGNOSIS — E11.59 DIABETES MELLITUS WITH CIRCULATORY COMPLICATION (HCC): Primary | ICD-10-CM

## 2018-07-30 DIAGNOSIS — L97.524 ULCER OF TOE OF LEFT FOOT, WITH NECROSIS OF BONE (HCC): ICD-10-CM

## 2018-07-30 PROCEDURE — 29445 APPL RIGID TOT CNTC LEG CAST: CPT

## 2018-07-30 NOTE — PROGRESS NOTES
Subjective    Chief Complaint  This information was obtained from the patient  Patient is here for a follow up of the left foot wound. Complaining of angle of cast from last visit. No complaints of pain.     Allergies  NKDA    HPI  This information was obta 7/23/18: Pt is here for follow up, states he has been sitting and elevating leg most of the day. He has limited walking as instructed. He is also tolerating the TCC well. Feels last application was a little tight but no pain.  Pt brought his dressing as ins BP Elevated, on antihypertensive meds. . Pulse RRR. RR within normal limits. Afebrile. Within Ideal body weight range. Alert, calm, well developed, in no apparent distress.  Height/Length: 72 in (182.88 cm), Weight: 212 lbs (96.36 kgs), BMI: 28.7, [place] Ankle Measurement 11 cm from heel with left measurement of 23.4 cm  Right Extremity:   Calf Measurement 33 cm from heel   Ankle Measurement 11 cm from heel          Assessment          Procedures    Wound #1  Wound #1 (Diabetic Ulcer) is located on the lef Vitamin A: Dark green, leafy vegetables, orange or yellow vegetables, cantaloupe, fortified dairy products, liver, fortified cereals  Zinc: Fortified cereals, red meats, seafood    S/S of Infection  Non-adherence    Care summary  Reviewed and evaluated lab

## 2018-08-06 ENCOUNTER — OFFICE VISIT (OUTPATIENT)
Dept: WOUND CARE | Age: 68
End: 2018-08-06
Attending: NURSE PRACTITIONER
Payer: MEDICARE

## 2018-08-06 DIAGNOSIS — E11.59 DIABETES MELLITUS WITH CIRCULATORY COMPLICATION (HCC): Primary | ICD-10-CM

## 2018-08-06 DIAGNOSIS — L97.524 ULCER OF TOE OF LEFT FOOT, WITH NECROSIS OF BONE (HCC): ICD-10-CM

## 2018-08-06 PROCEDURE — 29445 APPL RIGID TOT CNTC LEG CAST: CPT

## 2018-08-07 DIAGNOSIS — I10 ESSENTIAL HYPERTENSION: ICD-10-CM

## 2018-08-07 RX ORDER — METOPROLOL TARTRATE 50 MG/1
TABLET, FILM COATED ORAL
Qty: 180 TABLET | Refills: 0 | Status: SHIPPED | OUTPATIENT
Start: 2018-08-07 | End: 2018-11-06

## 2018-08-07 NOTE — TELEPHONE ENCOUNTER
Last refilled on 5/14/18 for # 180 with 0 rf. Last labs 5/8/18. Last seen on 3/6/18.   BP Readings from Last 3 Encounters:  05/08/18 : 154/70  03/21/18 : 147/63  03/06/18 : 132/84    Future Appointments  Date Time Provider Alexei Snow   8/8/2018 9:0

## 2018-08-08 ENCOUNTER — APPOINTMENT (OUTPATIENT)
Dept: WOUND CARE | Age: 68
End: 2018-08-08
Attending: NURSE PRACTITIONER
Payer: MEDICARE

## 2018-08-08 DIAGNOSIS — L97.524 ULCER OF TOE OF LEFT FOOT, WITH NECROSIS OF BONE (HCC): ICD-10-CM

## 2018-08-08 DIAGNOSIS — E11.59 DIABETES MELLITUS WITH CIRCULATORY COMPLICATION (HCC): Primary | ICD-10-CM

## 2018-08-08 PROCEDURE — 29445 APPL RIGID TOT CNTC LEG CAST: CPT

## 2018-08-15 ENCOUNTER — OFFICE VISIT (OUTPATIENT)
Dept: WOUND CARE | Age: 68
End: 2018-08-15
Attending: NURSE PRACTITIONER
Payer: MEDICARE

## 2018-08-15 DIAGNOSIS — E11.59 DIABETES MELLITUS WITH CIRCULATORY COMPLICATION (HCC): Primary | ICD-10-CM

## 2018-08-15 DIAGNOSIS — Z91.81 AT RISK FOR FALLING: ICD-10-CM

## 2018-08-15 DIAGNOSIS — L97.524 ULCER OF TOE OF LEFT FOOT, WITH NECROSIS OF BONE (HCC): ICD-10-CM

## 2018-08-15 PROCEDURE — 99214 OFFICE O/P EST MOD 30 MIN: CPT

## 2018-08-15 NOTE — PROGRESS NOTES
Subjective    Chief Complaint  This information was obtained from the patient  Patient is here for a follow up visit for a wound to the left foot. He reports this cast was too tight.     Allergies  NKDA    HPI  This information was obtained from the patient 8-15-18 patient returns today with spouse, he states the cast was too tight. He has developed some SDTI areas on his dorsal distal digits. Per spouse he was c/o pain/tightness but would not allow her to call the WC.   I stressed the importance that if jatinder Medication reconciliation completed at today's visit. : Yes        Objective    Constitutional  bp wnl for patient. Pulse Regular and wnl for patient. Roverto Delude Respirations easy and unlabored. Temperature wnl. elevated BMI. Appearance neat and clean.  Appears in no wound bed with epithelial island in center. area on 2nd dorsal distal digit with purple, non blancheable discoloration, no bogginess, no surrounding errythema approximately 0.5 cm in diameter.  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

## 2018-08-20 ENCOUNTER — TELEPHONE (OUTPATIENT)
Dept: FAMILY MEDICINE CLINIC | Facility: CLINIC | Age: 68
End: 2018-08-20

## 2018-08-20 NOTE — TELEPHONE ENCOUNTER
Form for diabetic shoes has been filled out by Dr Darren Riggins.   Need to fax along with OV note to [address] [place] [address]

## 2018-08-21 ENCOUNTER — TELEPHONE (OUTPATIENT)
Dept: FAMILY MEDICINE CLINIC | Facility: CLINIC | Age: 68
End: 2018-08-21

## 2018-08-21 NOTE — TELEPHONE ENCOUNTER
Due to patient insurance must use [employer] for diabetic shoes.      Order and OV note faxed to [facility] at [phone]

## 2018-08-21 NOTE — TELEPHONE ENCOUNTER
Left message on voicemail/answering machine for patient to call office   Need to know which location patient would like order faxed to.

## 2018-08-21 NOTE — TELEPHONE ENCOUNTER
Patient wife Viki Nation returned call. States they will be using [facility] location.   Requests info be faxed there    Spoke with Karolina at [address] who states they need  of OV within the last 6 months and order faxed [phone]    Order and OV n

## 2018-08-22 ENCOUNTER — OFFICE VISIT (OUTPATIENT)
Dept: WOUND CARE | Age: 68
End: 2018-08-22
Attending: NURSE PRACTITIONER
Payer: MEDICARE

## 2018-08-22 DIAGNOSIS — E11.59 DIABETES MELLITUS WITH CIRCULATORY COMPLICATION (HCC): Primary | ICD-10-CM

## 2018-08-22 DIAGNOSIS — L97.524 ULCER OF TOE OF LEFT FOOT, WITH NECROSIS OF BONE (HCC): ICD-10-CM

## 2018-08-22 PROCEDURE — 99215 OFFICE O/P EST HI 40 MIN: CPT

## 2018-08-22 NOTE — PROGRESS NOTES
Subjective    Chief Complaint  This information was obtained from the patient  Patient is here for a follow up visit for a wound to the left foot. Patient stated no pain. Patient has edema to the left leg.     Allergies  NKDA    HPI  This information was ob further breakdown. because of the areas of pressure I am hesitant to put him back in tcc.  I constructed felted foam offloading and we placed him back in darco    8-22-18 patient returns today with spouse, he developed a new wound on the right medial foot kgs), BMI: 28.7, Temperature: 96.4 °F (35.78 °C), Pulse: 72 bpm, Respiratory Rate: 16 breaths/min, Blood Pressure: 169/78 mmHg, Capillary Blood Glucose: 188 mg/dl.      Cardiovascular:  pulsatile signals at the dp/pt/distal hallux(stump) bilaterally and at amount of serous drainage noted which has no odor. The patient reports a wound pain of level 0/10. The wound margin is well defined. Wound bed has 51-75% slough, 1-25% bright red, pink, firm granulation.    The periwound skin moisture is normal. The periwou possible. Off-Loading  No Weight Bearing  Darco shoe with peg insert  Other: - felted foam    Follow-Up Appointments  Return Appointment in 1 week. Misc/Additional Orders  Supplement with a daily multivitamin.   Increase dietary protein - look for Juli margin of the fifth metatarsal head that measures approximately 8 mm in greatest dimension.  There is secondary soft tissue edema/cellulitis extending from the cutaneous wound to the lateral margin of the fifth   metatarsal head where there is mild bone mar

## 2018-08-29 ENCOUNTER — OFFICE VISIT (OUTPATIENT)
Dept: WOUND CARE | Age: 68
End: 2018-08-29
Attending: NURSE PRACTITIONER
Payer: MEDICARE

## 2018-08-29 DIAGNOSIS — E11.59 DIABETES MELLITUS WITH CIRCULATORY COMPLICATION (HCC): Primary | ICD-10-CM

## 2018-08-29 DIAGNOSIS — L97.524 ULCER OF TOE OF LEFT FOOT, WITH NECROSIS OF BONE (HCC): ICD-10-CM

## 2018-08-29 PROCEDURE — 11730 AVULSION NAIL PLATE SIMPLE 1: CPT

## 2018-08-29 PROCEDURE — 11721 DEBRIDE NAIL 6 OR MORE: CPT

## 2018-08-29 NOTE — PROGRESS NOTES
Subjective    Chief Complaint  This information was obtained from the patient  Patient is here for a wound care follow up. He states that he may have a new wound from having walked outside in flip flops. He presents without compression on.     Allergies  NK shoes with inserts to prevent further breakdown. because of the areas of pressure I am hesitant to put him back in tcc.  I constructed felted foam offloading and we placed him back in darco    8-22-18 patient returns today with spouse, he developed a new w Information  Medication reconciliation completed at today's visit. : Yes        Objective    Constitutional  bp wnl for patient. Pulse Regular and wnl for patient. [place] Respirations easy and unlabored. Temperature wnl. elevated BMI. Appearance neat and clean. Medial Foot is an acute Bolivar Grade 1 Diabetic Ulcer and has received a status of Not Healed. Subsequent wound encounter measurements are 0.3cm length x 0.3cm width with no measurable depth, with an area of 0.09 sq cm . No tunneling has been noted.  No sin medical decision-making. Alert and oriented times 3. No evidence of depression, anxiety, or agitation. Calm, cooperative, and communicative. Appropriate interactions and affect.         Assessment    Active Problems    ICD-10  (Encounter Diagnosis) E11.59 - every: - WEEKLY    Wound #3 Right, Medial Foot     Wound Cleansing & Dressings  May shower with protection.   Antibiotic Ointment/Cream. - bactroban to gauze or bandaid  Change Dressing Daily and PRN    Wound #4 Left Second Toe     Wound Cleansing & Dressin and at the left 2nd and 5th digit distal tip, and right medial foot distal to wound with doppler  Perfusion assessed by palpation of pulses.  - weak dp left  Arterial/Vascular consult: - [facility] - dr Deangelo Heredia.  balloon intervention 1-5-18  Last sharp debridement death following) occur due to diabetic foot wounds/calluses from improper foot wear and repetetive trauma. patient has a difficult time comprehending how age and disease processes change the body's ability to heal and prevent wounds.   he was agreeable

## 2018-09-05 ENCOUNTER — OFFICE VISIT (OUTPATIENT)
Dept: WOUND CARE | Age: 68
End: 2018-09-05
Attending: NURSE PRACTITIONER
Payer: MEDICARE

## 2018-09-05 DIAGNOSIS — E11.59 DIABETES MELLITUS WITH CIRCULATORY COMPLICATION (HCC): Primary | ICD-10-CM

## 2018-09-05 DIAGNOSIS — L97.524 ULCER OF TOE OF LEFT FOOT, WITH NECROSIS OF BONE (HCC): ICD-10-CM

## 2018-09-05 PROCEDURE — 99213 OFFICE O/P EST LOW 20 MIN: CPT

## 2018-09-05 NOTE — PROGRESS NOTES
Subjective    Chief Complaint  This information was obtained from the patient  Patient is here for a follow up of for bilateral feet. Patient states he took his last prescribed antibiotic today. Patient states \"the toe looks terrific. \"     Allergies  NKD the patient, caregiver  Patient complains of:   Eyes: Glasses / Contacts  Cardiovascular (Central/Peripheral): Edema (stable), Other (htn, hl, valvular disease, lle baloon intervention)  Genitourinary (): Other (ckd stage 3)  Integumentary (Hair/Skin/Ines 1 Diabetic Ulcer and has received a status of Not Healed. Subsequent wound encounter measurements are 0.3cm length x 0.6cm width with no measurable depth, with an area of 0.18 sq cm . No tunneling has been noted. No sinus tract has been noted.  No undermini patient reports no wound pain due to the wound being insensate. The wound margin is well defined. Wound bed has % pink, firm granulation. The periwound skin moisture is normal. The periwound skin exhibited: Edema, Erythema.  The periwound skin did n 2 packets/day.  you can order online at [employer] or [address] [place] [address] or some [employer] carry it or can order it for you  AND  FOCUS ON THE FOLLOWING FOODS:  Protein: Meats, beans, eggs, milk and yogurt particularly [place] yogurt), tofu, soy nuts, soy protein products  V should be scheduled. will utlize bactroban on all wounds, continue with felted foam offlaoding. discussed with patient/spouse that if he is getting a mold made for shoes they will need to remove the felted foam and then replace it.  they verbalized u

## 2018-09-11 ENCOUNTER — TELEPHONE (OUTPATIENT)
Dept: FAMILY MEDICINE CLINIC | Facility: CLINIC | Age: 68
End: 2018-09-11

## 2018-09-11 DIAGNOSIS — L97.509 FOOT ULCER DUE TO SECONDARY DM (HCC): ICD-10-CM

## 2018-09-11 DIAGNOSIS — E13.621 FOOT ULCER DUE TO SECONDARY DM (HCC): ICD-10-CM

## 2018-09-11 DIAGNOSIS — E11.42 TYPE 2 DIABETES MELLITUS WITH DIABETIC POLYNEUROPATHY, WITHOUT LONG-TERM CURRENT USE OF INSULIN (HCC): Primary | ICD-10-CM

## 2018-09-12 ENCOUNTER — OFFICE VISIT (OUTPATIENT)
Dept: WOUND CARE | Age: 68
End: 2018-09-12
Attending: NURSE PRACTITIONER
Payer: MEDICARE

## 2018-09-12 DIAGNOSIS — L97.524 ULCER OF TOE OF LEFT FOOT, WITH NECROSIS OF BONE (HCC): ICD-10-CM

## 2018-09-12 DIAGNOSIS — E11.59 DIABETES MELLITUS WITH CIRCULATORY COMPLICATION (HCC): Primary | ICD-10-CM

## 2018-09-12 PROCEDURE — 99213 OFFICE O/P EST LOW 20 MIN: CPT

## 2018-09-12 NOTE — PROGRESS NOTES
Subjective    Chief Complaint  This information was obtained from the patient  Patient is here for a follow up of for bilateral feet. Patient denies pain. Allergies  NKDA    HPI  This information was obtained from the patient, caregiver and chart.   11-2 (Central/Peripheral): Edema (stable), Other (htn, hl, valvular disease, lle baloon intervention)  Genitourinary (): Other (ckd stage 3)  Integumentary (Hair/Skin/Nails): Calluses/Corns (none today), Dryness, Ulcers  Neurological: Loss of Protective Sensa measurements are 0.3cm length x 0.6cm width with no measurable depth, with an area of 0.18 sq cm . No tunneling has been noted. No sinus tract has been noted. No undermining has been noted.  There is a small amount of serous drainage noted which has no odor periwound skin exhibited: Edema, Callus, Dry/Scaly, Erythema. The periwound skin did not exhibit: Crepitus, Fluctuance, Friable, Ecchymosis. The temperature of the periwound skin is WNL. Periwound skin does not exhibit signs or symptoms of infection.  Local THE FOLLOWING FOODS:  Protein: Meats, beans, eggs, milk and yogurt particularly [place] yogurt), tofu, soy nuts, soy protein products  Vitamin C: Citrus fruits and juices, strawberries, tomatoes, tomato juice, peppers, baked potatoes, spinach, broccoli, caul Signature(s)  Signed By: Date:  Flako TORIBIO-C, YO-AP, CFCN, [address] [place] [address],[address], [facility] 09/12/2018 12:48:59       Entered By: Flako Gore on 09/12/2018 12:48:48

## 2018-09-17 ENCOUNTER — TELEPHONE (OUTPATIENT)
Dept: FAMILY MEDICINE CLINIC | Facility: CLINIC | Age: 68
End: 2018-09-17

## 2018-09-17 DIAGNOSIS — N18.4 CKD (CHRONIC KIDNEY DISEASE) STAGE 4, GFR 15-29 ML/MIN (HCC): Primary | ICD-10-CM

## 2018-09-17 RX ORDER — AMLODIPINE BESYLATE 10 MG/1
TABLET ORAL
Qty: 30 TABLET | Refills: 0 | Status: SHIPPED | OUTPATIENT
Start: 2018-09-17 | End: 2018-11-12

## 2018-09-17 RX ORDER — HYDRALAZINE HYDROCHLORIDE 25 MG/1
25 TABLET, FILM COATED ORAL 2 TIMES DAILY
Qty: 60 TABLET | Refills: 0 | Status: SHIPPED | OUTPATIENT
Start: 2018-09-17 | End: 2018-11-12

## 2018-09-17 NOTE — TELEPHONE ENCOUNTER
Pt needs referral for Dr. Gloria Harada, kidney. Pt has appt Oct 18. Pls call [facility] Query when referral is ready.

## 2018-09-18 RX ORDER — HYDRALAZINE HYDROCHLORIDE 25 MG/1
TABLET, FILM COATED ORAL
Qty: 180 TABLET | Refills: 0 | OUTPATIENT
Start: 2018-09-18

## 2018-09-18 RX ORDER — AMLODIPINE BESYLATE 10 MG/1
TABLET ORAL
Qty: 90 TABLET | Refills: 0 | OUTPATIENT
Start: 2018-09-18

## 2018-09-19 ENCOUNTER — OFFICE VISIT (OUTPATIENT)
Dept: WOUND CARE | Age: 68
End: 2018-09-19
Attending: NURSE PRACTITIONER
Payer: MEDICARE

## 2018-09-19 ENCOUNTER — HOSPITAL ENCOUNTER (OUTPATIENT)
Dept: GENERAL RADIOLOGY | Age: 68
Discharge: HOME | End: 2018-09-19
Attending: NURSE PRACTITIONER
Payer: MEDICARE

## 2018-09-19 DIAGNOSIS — L97.524 ULCER OF TOE OF LEFT FOOT, WITH NECROSIS OF BONE (HCC): ICD-10-CM

## 2018-09-19 DIAGNOSIS — E11.59 DIABETES MELLITUS WITH CIRCULATORY COMPLICATION (HCC): Primary | ICD-10-CM

## 2018-09-19 PROCEDURE — 73630 X-RAY EXAM OF FOOT: CPT | Performed by: NURSE PRACTITIONER

## 2018-09-19 PROCEDURE — 99213 OFFICE O/P EST LOW 20 MIN: CPT

## 2018-09-19 NOTE — PROGRESS NOTES
Subjective    Chief Complaint  This information was obtained from the patient  Patient is here for a wound care follow up. He denies pain. Allergies  NKDA    HPI  This information was obtained from the patient, caregiver and chart.   11-22-17 INITIAL:  p 9-19-18 patient returns today with spouse, they have not gotten the call that his diabetic shoes with inserts are ready yet. there is not any s/s of acute infection however both wounds have essentially stalled.  there is more dry/exudate  around the latera bp elevated recheck improved, patient denies symptoms of headache, dizziness, sob, or vision changes. will continue to monitor. Pulse Regular and wnl for patient. Burlene Stanton Respirations easy and unlabored. Temperature wnl. Weight normal for height. . Appearance neat Wound #4 Left Second Toe is a chronic Bolivar Grade 1 Diabetic Ulcer and has received a status of Not Healed. Subsequent wound encounter measurements are 0.4cm length x 0.5cm width x 0.2cm depth, with an area of 0.2 sq cm and a volume of 0.04 cubic cm.  No t Antibiotic Ointment/Cream. - please apply 2 drops of gentamicin to each wound and then cover with the adaptic mesh-if the wound is appearing dry you may need to \"fold\" the mesh to keep the wound more moist in between dressing changes  Change dressing BID Last sharp debridement date: - 7-11-18  Last A1C date and value: - January 2018 a1c 6.1  October 2017 a1c 5.8  Last albumin date and value: - may 2018 albumin 3.8/tp7.7  November 2017 albumin 3.7/tp7.8  Osteomyelitis suspected due to: - MRI results 11-30-1

## 2018-09-20 ENCOUNTER — TELEPHONE (OUTPATIENT)
Dept: FAMILY MEDICINE CLINIC | Facility: CLINIC | Age: 68
End: 2018-09-20

## 2018-09-20 NOTE — TELEPHONE ENCOUNTER
Need referral updated for 3 pair if inserts.   Spoke with Dalia Lozada at [facility] who updated referral.  Referral faxed to [address] [place] [address] at [facility] at [phone]

## 2018-09-20 NOTE — TELEPHONE ENCOUNTER
Mirella Called form from [facility] to talk to [address] [place] [address] Nurse  Please return call to [phone]

## 2018-09-26 ENCOUNTER — OFFICE VISIT (OUTPATIENT)
Dept: WOUND CARE | Age: 68
End: 2018-09-26
Attending: NURSE PRACTITIONER
Payer: MEDICARE

## 2018-09-26 DIAGNOSIS — L97.524 ULCER OF TOE OF LEFT FOOT, WITH NECROSIS OF BONE (HCC): ICD-10-CM

## 2018-09-26 DIAGNOSIS — E11.59 DIABETES MELLITUS WITH CIRCULATORY COMPLICATION (HCC): Primary | ICD-10-CM

## 2018-09-26 PROCEDURE — 99214 OFFICE O/P EST MOD 30 MIN: CPT

## 2018-09-26 NOTE — PROGRESS NOTES
Subjective    Chief Complaint  This information was obtained from the patient  Patient is here for a wound care follow up. He states he has been using the eye drops twice a day.     Allergies  NKDA    HPI  This information was obtained from the patient, car 9-19-18 patient returns today with spouse, they have not gotten the call that his diabetic shoes with inserts are ready yet. there is not any s/s of acute infection however both wounds have essentially stalled.  there is more dry/exudate  around the latera Cardiovascular (Central/Peripheral): Chest Pain, Dyspnea on Exertion, Intermittent Claudication, Lower extremity (leg) resting pain  Gastrointestinal (GI): Difficulty Swallowing  Neurological: Numbness, Tingling  Psychiatric: Claustrophobia, Memory Loss, D The periwound skin exhibited: Callus, Dry/Scaly. The periwound skin did not exhibit: Edema, Induration, Fluctuance, Ecchymosis, Erythema, Rubor. The temperature of the periwound skin is Warm. Periwound skin does not exhibit signs or symptoms of infection. Additional Orders:    Wound Cleansing & Dressings  May not shower.  - Cleanse limb, foot, between toes with soap and water with each dressing change  Cleanse with saline or wound cleanser  Antibiotic Ointment/Cream. - please apply 2 drops of gentamicin to t January 2018 bun 21, creat 1.96, gfr34  NOVEMBER 2017 BUN 22, CREAT 2.03, GFR33  Wound type: - DFU  Wound improving. No s/s of infection.    Perfusion assessed by TRISTON/TBI - [facility]-  Perfusion assessed by doppler. - pulsatile signals at the dp/pt/distal hallux( I d/w patient and spouse transition to the accomodative shoe with inserts with white socks to monitor for drainage, frequent foot inspection and if either of the aformentioned occur to return to the [address] [facility].      we also discussed routine diabetic foot c

## 2018-10-03 ENCOUNTER — OFFICE VISIT (OUTPATIENT)
Dept: WOUND CARE | Age: 68
End: 2018-10-03
Attending: NURSE PRACTITIONER
Payer: MEDICARE

## 2018-10-03 DIAGNOSIS — E11.59 DIABETES MELLITUS WITH CIRCULATORY COMPLICATION (HCC): Primary | ICD-10-CM

## 2018-10-03 DIAGNOSIS — L97.524 ULCER OF TOE OF LEFT FOOT, WITH NECROSIS OF BONE (HCC): ICD-10-CM

## 2018-10-03 PROCEDURE — 99213 OFFICE O/P EST LOW 20 MIN: CPT

## 2018-10-03 NOTE — PROGRESS NOTES
Subjective    Chief Complaint  This information was obtained from the patient  Patient is here for a wound care follow up of left foot and toe wounds.  Patients stated denies pain and no concern     Allergies  NKDA    HPI  This information was obtained from This information was obtained from the patient, caregiver  Patient complains of:   Eyes: Glasses / Contacts  Cardiovascular (Central/Peripheral): Edema (stable), Other (htn, hl, valvular disease, lle baloon intervention)  Genitourinary (): Other (ckd sta Wound #1 Left, Lateral Foot is a chronic Bolivar Grade 1 Diabetic Ulcer and has received an outcome of Resolved. Subsequent wound encounter measurements are 0cm length x 0cm width with no measurable depth, with an area of 0 sq cm .  No tunneling has been not (Encounter Diagnosis) E11.621 - Type 2 diabetes mellitus with foot ulcer  (Encounter Diagnosis) L97.524 - Non-pressure chronic ulcer of other part of left foot with necrosis of bone  (Encounter Diagnosis) Z79.84 - Long term (current) use of oral hypoglycem January 2018 bun 21, creat 1.96, gfr34  NOVEMBER 2017 BUN 22, CREAT 2.03, GFR33  Wound type: - DFU  Wound improving. No s/s of infection.    Perfusion assessed by TRISTON/TBI - [facility]-  Perfusion assessed by doppler. - pulsatile signals at the dp/pt/distal hallux( patient doing well, discussed continue monitoring of feet, also discussed potentially utilizing toe lift on the 2nd digit if it continues not to heal even with increased moisture    Electronic Signature(s)  Signed By: Date:  Tai Tanging MALU-C, YO-AP,

## 2018-10-09 ENCOUNTER — TELEPHONE (OUTPATIENT)
Dept: FAMILY MEDICINE CLINIC | Facility: CLINIC | Age: 68
End: 2018-10-09

## 2018-10-09 DIAGNOSIS — Z95.2 S/P TAVR (TRANSCATHETER AORTIC VALVE REPLACEMENT): ICD-10-CM

## 2018-10-09 DIAGNOSIS — E11.42 TYPE 2 DIABETES MELLITUS WITH DIABETIC POLYNEUROPATHY, WITHOUT LONG-TERM CURRENT USE OF INSULIN (HCC): ICD-10-CM

## 2018-10-09 DIAGNOSIS — N18.4 CKD (CHRONIC KIDNEY DISEASE) STAGE 4, GFR 15-29 ML/MIN (HCC): ICD-10-CM

## 2018-10-09 DIAGNOSIS — L97.509 FOOT ULCER DUE TO SECONDARY DM (HCC): Primary | ICD-10-CM

## 2018-10-09 DIAGNOSIS — E13.621 FOOT ULCER DUE TO SECONDARY DM (HCC): Primary | ICD-10-CM

## 2018-10-09 DIAGNOSIS — C18.7 CANCER OF SIGMOID COLON (HCC): ICD-10-CM

## 2018-10-09 DIAGNOSIS — I25.10 CORONARY ARTERY DISEASE INVOLVING NATIVE CORONARY ARTERY OF NATIVE HEART WITHOUT ANGINA PECTORIS: ICD-10-CM

## 2018-10-09 DIAGNOSIS — Z86.79 HISTORY OF ATRIAL FIBRILLATION: ICD-10-CM

## 2018-10-09 DIAGNOSIS — L97.522 ULCERATED, FOOT, LEFT, WITH FAT LAYER EXPOSED (HCC): ICD-10-CM

## 2018-10-09 NOTE — TELEPHONE ENCOUNTER
Spouse called pt is in need of some referrals   Dr. Bhupendra Oliver- oncologist 11/20    Dr. Margarita Ko- cardiologist 10/17    Aniyah Nicole 10/18  Also needs more visits with his wound doctor. He goes every Wednesday and is going to need more referrals approved visits.

## 2018-10-10 ENCOUNTER — OFFICE VISIT (OUTPATIENT)
Dept: WOUND CARE | Age: 68
End: 2018-10-10
Attending: NURSE PRACTITIONER
Payer: MEDICARE

## 2018-10-10 DIAGNOSIS — L97.524 ULCER OF TOE OF LEFT FOOT, WITH NECROSIS OF BONE (HCC): ICD-10-CM

## 2018-10-10 DIAGNOSIS — E11.59 DIABETES MELLITUS WITH CIRCULATORY COMPLICATION (HCC): Primary | ICD-10-CM

## 2018-10-10 PROCEDURE — 99213 OFFICE O/P EST LOW 20 MIN: CPT

## 2018-10-10 NOTE — PROGRESS NOTES
Subjective    Chief Complaint  This information was obtained from the patient  Patient is here for a wound care follow up for a left second toe wound. Patients denies any pain to the wound.     Allergies  NKDA    HPI  This information was obtained from the Genitourinary (): Other (ckd stage 3)  Integumentary (Hair/Skin/Nails): Calluses/Corns (minimal), Dryness, Ulcers  Neurological: Loss of Protective Sensation (bilaterally insensate)  Psychiatric: Nervousness / Tension    Patient denies complaints or symp Wound #4 Left Second Toe is a chronic Bolivar Grade 1 Diabetic Ulcer and has received a status of Not Healed. Subsequent wound encounter measurements are 0.4cm length x 0.5cm width x 0.1cm depth, with an area of 0.2 sq cm and a volume of 0.02 cubic cm.  No t 2) moisturize everywhere except between the toes  3) monitor for drainage  4) inspect your feet every hour when you first start wearing the shoe-look for redness, callus, blister, purple-if you note any of those immediately stop wearing the shoe and return Perfusion assessed by doppler. - pulsatile signals at the dp/pt/distal hallux(stump) bilaterally and at the left 2nd and 5th digit distal tip, and right medial foot distal to wound with doppler  Perfusion assessed by palpation of pulses.  - weak dp left  Ar d/w patient and spouse that I am still concerned about underlying osteo and that I would like him to get a consult with dr. Carl Perez the dpm at the wound clinic. I will also update dr Carl Perez and ask for his opinion based on the xray we have already gotten.

## 2018-10-15 DIAGNOSIS — E11.42 TYPE 2 DIABETES MELLITUS WITH DIABETIC POLYNEUROPATHY, WITHOUT LONG-TERM CURRENT USE OF INSULIN (HCC): ICD-10-CM

## 2018-10-15 RX ORDER — GLIPIZIDE 5 MG/1
TABLET, FILM COATED, EXTENDED RELEASE ORAL
Qty: 90 TABLET | Refills: 2 | Status: SHIPPED | OUTPATIENT
Start: 2018-10-15 | End: 2018-11-13

## 2018-10-15 NOTE — TELEPHONE ENCOUNTER
Last refilled on 4/16/18 for # 90 with 1 refills  Last A1C 6.2 on 5/30/18  Last seen on 3/6/18  Future Appointments   Date Time Provider Alexei Snow   10/18/2018  1:30 PM Joyce Morgan MD [facility]   10/24/2018  9:00 AM PF [id number] P

## 2018-10-17 ENCOUNTER — PRIOR ORIGINAL RECORDS (OUTPATIENT)
Dept: OTHER | Age: 68
End: 2018-10-17

## 2018-10-17 ENCOUNTER — MYAURORA ACCOUNT LINK (OUTPATIENT)
Dept: OTHER | Age: 68
End: 2018-10-17

## 2018-10-18 ENCOUNTER — TELEPHONE (OUTPATIENT)
Dept: FAMILY MEDICINE CLINIC | Facility: CLINIC | Age: 68
End: 2018-10-18

## 2018-10-18 ENCOUNTER — OFFICE VISIT (OUTPATIENT)
Dept: NEPHROLOGY | Facility: CLINIC | Age: 68
End: 2018-10-18

## 2018-10-18 VITALS — SYSTOLIC BLOOD PRESSURE: 152 MMHG | WEIGHT: 213 LBS | BODY MASS INDEX: 31 KG/M2 | DIASTOLIC BLOOD PRESSURE: 78 MMHG

## 2018-10-18 DIAGNOSIS — I10 ESSENTIAL HYPERTENSION: ICD-10-CM

## 2018-10-18 DIAGNOSIS — N18.4 CKD (CHRONIC KIDNEY DISEASE) STAGE 4, GFR 15-29 ML/MIN (HCC): Primary | ICD-10-CM

## 2018-10-18 DIAGNOSIS — R80.9 PROTEINURIA, UNSPECIFIED TYPE: ICD-10-CM

## 2018-10-18 DIAGNOSIS — E11.42 TYPE 2 DIABETES MELLITUS WITH DIABETIC POLYNEUROPATHY, WITHOUT LONG-TERM CURRENT USE OF INSULIN (HCC): Primary | ICD-10-CM

## 2018-10-18 PROCEDURE — 99214 OFFICE O/P EST MOD 30 MIN: CPT | Performed by: INTERNAL MEDICINE

## 2018-10-18 RX ORDER — AMLODIPINE BESYLATE 10 MG/1
10 TABLET ORAL DAILY
Qty: 90 TABLET | Refills: 3 | Status: SHIPPED | OUTPATIENT
Start: 2018-10-18 | End: 2019-10-07

## 2018-10-18 RX ORDER — HYDRALAZINE HYDROCHLORIDE 25 MG/1
25 TABLET, FILM COATED ORAL 2 TIMES DAILY
Qty: 180 TABLET | Refills: 3 | Status: SHIPPED | OUTPATIENT
Start: 2018-10-18 | End: 2019-10-07

## 2018-10-18 NOTE — PROGRESS NOTES
Nephrology Progress Note      ASSESSMENT/PLAN:        1) CKD 3- baseline Cr 2-2.5 mg/dL is due to hypertensive nephrosclerosis, diabetic nephropathy, chronic NSAID use and history of right-sided urinary obstruction due to complete ureteral compression by m blood pressure    • High cholesterol    • History of blood transfusion     March/2016   • History of stomach ulcers     recent gi bleed-March/2016   • History of stroke 2004   • Hyperlipidemia    • Hyperlipidemia LDL goal <70    • Hypertension, essential, by Lalitha Zee DPM at [facility] MAIN OR   • UPPER GI ENDOSCOPY,EXAM        Family History   Problem Relation Age of Onset   • Heart Disorder Father 62        MI   • Hypertension Father    • Heart Disorder Brother 67        MI   • Diabetes Neg       Social BMI 30.56 kg/m²   Wt Readings from Last 3 Encounters:  10/18/18 : 213 lb  05/08/18 : 216 lb 9.6 oz  03/06/18 : 217 lb 6.4 oz    General: Alert and oriented in no apparent distress.   HEENT: No scleral icterus, MMM  Neck: Supple, no FEROZ or thyromegaly  Card

## 2018-10-19 RX ORDER — PREGABALIN 100 MG/1
CAPSULE ORAL
Qty: 270 CAPSULE | Refills: 1 | Status: SHIPPED | OUTPATIENT
Start: 2018-10-19 | End: 2019-01-24

## 2018-10-22 DIAGNOSIS — E78.1 PURE HYPERGLYCERIDEMIA: ICD-10-CM

## 2018-10-22 RX ORDER — ATORVASTATIN CALCIUM 10 MG/1
TABLET, FILM COATED ORAL
Qty: 90 TABLET | Refills: 3 | Status: SHIPPED | OUTPATIENT
Start: 2018-10-22 | End: 2019-10-16

## 2018-10-22 NOTE — TELEPHONE ENCOUNTER
Last refilled on 10/23/17 for # 90 with 3 refills  Last lipid 1/22/18  Last seen on 3/6/18  Future Appointments   Date Time Provider Alexei Snow   10/24/2018  9:00 AM [facility]   10/29/2018  1:30  [address] Wound [facility]

## 2018-10-24 ENCOUNTER — OFFICE VISIT (OUTPATIENT)
Dept: WOUND CARE | Age: 68
End: 2018-10-24
Attending: NURSE PRACTITIONER
Payer: MEDICARE

## 2018-10-24 ENCOUNTER — ORDER TRANSCRIPTION (OUTPATIENT)
Dept: WOUND CARE | Facility: HOSPITAL | Age: 68
End: 2018-10-24

## 2018-10-24 DIAGNOSIS — M86.9 OSTEOMYELITIS OF ANKLE AND FOOT (HCC): Primary | ICD-10-CM

## 2018-10-24 DIAGNOSIS — E11.59 DIABETES MELLITUS WITH CIRCULATORY COMPLICATION (HCC): Primary | ICD-10-CM

## 2018-10-24 DIAGNOSIS — L97.524 ULCER OF TOE OF LEFT FOOT, WITH NECROSIS OF BONE (HCC): ICD-10-CM

## 2018-10-24 PROCEDURE — 99213 OFFICE O/P EST LOW 20 MIN: CPT

## 2018-10-25 ENCOUNTER — TELEPHONE (OUTPATIENT)
Dept: FAMILY MEDICINE CLINIC | Facility: CLINIC | Age: 68
End: 2018-10-25

## 2018-10-25 DIAGNOSIS — H26.9 CATARACT, UNSPECIFIED CATARACT TYPE, UNSPECIFIED LATERALITY: Primary | ICD-10-CM

## 2018-10-26 ENCOUNTER — TELEPHONE (OUTPATIENT)
Dept: FAMILY MEDICINE CLINIC | Facility: CLINIC | Age: 68
End: 2018-10-26

## 2018-10-26 DIAGNOSIS — L97.509 FOOT ULCER DUE TO SECONDARY DM (HCC): Primary | ICD-10-CM

## 2018-10-26 DIAGNOSIS — E13.621 FOOT ULCER DUE TO SECONDARY DM (HCC): Primary | ICD-10-CM

## 2018-10-26 DIAGNOSIS — M20.42 HAMMER TOE OF LEFT FOOT: ICD-10-CM

## 2018-10-26 DIAGNOSIS — Z89.412 STATUS POST AMPUTATION OF LEFT GREAT TOE (HCC): ICD-10-CM

## 2018-10-26 NOTE — TELEPHONE ENCOUNTER
Patient needs a referral to see his Podiatrist, Dr Heidi Herbert & also his Opthamologist, Dr Saud Rodriguez. Please call when referrals are completed so they can call and make the appointments.

## 2018-10-29 ENCOUNTER — APPOINTMENT (OUTPATIENT)
Dept: WOUND CARE | Facility: HOSPITAL | Age: 68
End: 2018-10-29
Attending: NURSE PRACTITIONER
Payer: MEDICARE

## 2018-10-30 ENCOUNTER — MED REC SCAN ONLY (OUTPATIENT)
Dept: FAMILY MEDICINE CLINIC | Facility: CLINIC | Age: 68
End: 2018-10-30

## 2018-11-02 ENCOUNTER — HOSPITAL ENCOUNTER (OUTPATIENT)
Dept: MRI IMAGING | Age: 68
Discharge: HOME | End: 2018-11-02
Attending: NURSE PRACTITIONER
Payer: MEDICARE

## 2018-11-02 DIAGNOSIS — M86.9 OSTEOMYELITIS OF ANKLE AND FOOT (HCC): ICD-10-CM

## 2018-11-02 PROCEDURE — 73718 MRI LOWER EXTREMITY W/O DYE: CPT | Performed by: NURSE PRACTITIONER

## 2018-11-06 DIAGNOSIS — I10 ESSENTIAL HYPERTENSION: ICD-10-CM

## 2018-11-06 RX ORDER — METOPROLOL TARTRATE 50 MG/1
TABLET, FILM COATED ORAL
Qty: 180 TABLET | Refills: 0 | Status: SHIPPED | OUTPATIENT
Start: 2018-11-06 | End: 2019-02-09

## 2018-11-07 ENCOUNTER — OFFICE VISIT (OUTPATIENT)
Dept: WOUND CARE | Age: 68
End: 2018-11-07
Attending: NURSE PRACTITIONER
Payer: MEDICARE

## 2018-11-07 DIAGNOSIS — E11.59 DIABETES MELLITUS WITH CIRCULATORY COMPLICATION (HCC): ICD-10-CM

## 2018-11-07 DIAGNOSIS — M86.9 OSTEOMYELITIS OF ANKLE AND FOOT (HCC): Primary | ICD-10-CM

## 2018-11-07 PROCEDURE — 99213 OFFICE O/P EST LOW 20 MIN: CPT

## 2018-11-07 NOTE — PROGRESS NOTES
Subjective    Chief Complaint  This information was obtained from the patient  Patient is here for a follow up of the left second toe. Patient states he was not happy with the silver product used last visit. They think it was turning red and not healing.  P 11-7-18 patient returns today, he did get his mri done 5 days ago which was indeed + for osteo of the 2nd digit, this was discussd with the patient including: hbo, iv abx, surgery. he states they have an appt with dr. Suresh Brown later this week.   I demonst bp wnl for patient. Pulse Regular and wnl for patient. Ottie Clack Respirations easy and unlabored. Temperature wnl. Weight normal for height. . Appearance neat and clean. Appears in no acute distress. Well nourished and well developed.  Height/Length: 72 in (182.88 cm patient to see dr Gonzalo Crouch the end of this week. Psychiatric:  judgement and insight is intact, however assistance by family for medical decision-making.  Alert and oriented times 3. patient with flat affect, but cooperative with exam and answers chen Vitamin C: Citrus fruits and juices, strawberries, tomatoes, tomato juice, peppers, baked potatoes, spinach, broccoli, cauliflower, [place] sprouts, cabbage  Vitamin A: Dark green, leafy vegetables, orange or yellow vegetables, cantaloupe, fortified dairy No destructive bony process to indicate osteomyelitis. Specifically the 5th metatarsal head shows no lytic lesion or loss of cortical outline, and the 2nd toe shows no destructive lytic process or loss of cortical outline.   Mild tissue swelling along   th

## 2018-11-09 ENCOUNTER — PRIOR ORIGINAL RECORDS (OUTPATIENT)
Dept: OTHER | Age: 68
End: 2018-11-09

## 2018-11-09 ENCOUNTER — TELEPHONE (OUTPATIENT)
Dept: FAMILY MEDICINE CLINIC | Facility: CLINIC | Age: 68
End: 2018-11-09

## 2018-11-09 NOTE — TELEPHONE ENCOUNTER
Spoke with patient wife who states they did not notify cardiology patient is having procedure. Wife will call cardiology to let them know and make sure they have no concerns. Patient needs labs and EKG done at preop appt.   Order in epic

## 2018-11-09 NOTE — TELEPHONE ENCOUNTER
Left message on voicemail/answering machine for patient to call office     Need to make sure patient has discussed upcoming surgery with cardiology as well.

## 2018-11-09 NOTE — TELEPHONE ENCOUNTER
Pt is coming in Monday for a pre-OP px. He is having his 2nd toe on his left foot removed. Dr Karlee Lopez.  11-15-18

## 2018-11-12 ENCOUNTER — PRIOR ORIGINAL RECORDS (OUTPATIENT)
Dept: OTHER | Age: 68
End: 2018-11-12

## 2018-11-12 ENCOUNTER — OFFICE VISIT (OUTPATIENT)
Dept: FAMILY MEDICINE CLINIC | Facility: CLINIC | Age: 68
End: 2018-11-12

## 2018-11-12 VITALS
BODY MASS INDEX: 30.92 KG/M2 | RESPIRATION RATE: 16 BRPM | HEIGHT: 70 IN | TEMPERATURE: 98 F | SYSTOLIC BLOOD PRESSURE: 138 MMHG | WEIGHT: 216 LBS | HEART RATE: 69 BPM | OXYGEN SATURATION: 98 % | DIASTOLIC BLOOD PRESSURE: 80 MMHG

## 2018-11-12 DIAGNOSIS — M86.072 ACUTE HEMATOGENOUS OSTEOMYELITIS OF LEFT FOOT (HCC): Primary | ICD-10-CM

## 2018-11-12 DIAGNOSIS — Z01.818 PREOP EXAMINATION: ICD-10-CM

## 2018-11-12 DIAGNOSIS — E11.42 TYPE 2 DIABETES MELLITUS WITH DIABETIC POLYNEUROPATHY, WITHOUT LONG-TERM CURRENT USE OF INSULIN (HCC): ICD-10-CM

## 2018-11-12 DIAGNOSIS — I35.0 NONRHEUMATIC AORTIC VALVE STENOSIS: ICD-10-CM

## 2018-11-12 DIAGNOSIS — I73.9 PVD (PERIPHERAL VASCULAR DISEASE) (HCC): ICD-10-CM

## 2018-11-12 DIAGNOSIS — I25.10 CORONARY ARTERY DISEASE INVOLVING NATIVE CORONARY ARTERY OF NATIVE HEART WITHOUT ANGINA PECTORIS: ICD-10-CM

## 2018-11-12 PROCEDURE — 93000 ELECTROCARDIOGRAM COMPLETE: CPT | Performed by: FAMILY MEDICINE

## 2018-11-12 PROCEDURE — 36415 COLL VENOUS BLD VENIPUNCTURE: CPT | Performed by: FAMILY MEDICINE

## 2018-11-12 PROCEDURE — 80048 BASIC METABOLIC PNL TOTAL CA: CPT | Performed by: PODIATRIST

## 2018-11-12 PROCEDURE — 85025 COMPLETE CBC W/AUTO DIFF WBC: CPT | Performed by: FAMILY MEDICINE

## 2018-11-12 PROCEDURE — 99214 OFFICE O/P EST MOD 30 MIN: CPT | Performed by: FAMILY MEDICINE

## 2018-11-12 PROCEDURE — 83036 HEMOGLOBIN GLYCOSYLATED A1C: CPT | Performed by: FAMILY MEDICINE

## 2018-11-12 NOTE — H&P
Oliva Huerta is a 76year old male who presents for a pre-operative physical exam. Patient is to have left 2nd toe removal, to be done by Dr. Sheela Reyes at [facility] on 11/20/18. HPI:   Pt complains of left foot wound on 2nd toe.  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         Imaging & Consult

## 2018-11-13 RX ORDER — GLIPIZIDE 10 MG/1
10 TABLET, FILM COATED, EXTENDED RELEASE ORAL DAILY
Qty: 90 TABLET | Refills: 0 | Status: SHIPPED | OUTPATIENT
Start: 2018-11-13 | End: 2019-02-09

## 2018-11-13 NOTE — TELEPHONE ENCOUNTER
Notes recorded by Pritesh Donahue DO on 11/13/2018 at 12:29 PM CST  Pls let pt know that his A1c is up to 6.9, so it is creeping up. Lets increase the glipizide to a 10 mg dose and recheck A1c in 3 months. Let me know where to send script.       Patient wi

## 2018-11-14 ENCOUNTER — TELEPHONE (OUTPATIENT)
Dept: FAMILY MEDICINE CLINIC | Facility: CLINIC | Age: 68
End: 2018-11-14

## 2018-11-14 NOTE — TELEPHONE ENCOUNTER
Called and spoke with Jasvir Zamora at [facility] to confirm the discontinuation of the Glipizide 5mg.  Jasvir Zamora confirmed that they have received the new prescription for the 10mg

## 2018-11-15 ENCOUNTER — TELEPHONE (OUTPATIENT)
Dept: FAMILY MEDICINE CLINIC | Facility: CLINIC | Age: 68
End: 2018-11-15

## 2018-11-15 ENCOUNTER — PRIOR ORIGINAL RECORDS (OUTPATIENT)
Dept: OTHER | Age: 68
End: 2018-11-15

## 2018-11-15 NOTE — TELEPHONE ENCOUNTER
Rec'd call from Dr. Chema Cruz regarding clearance for surgery. He reviewed the case including EKG from this week and does not think any intervention is needed prior to surgery. Pt is cleared for surgery on 11/20 at this time.      I generated a note, please f

## 2018-11-19 ENCOUNTER — TELEPHONE (OUTPATIENT)
Dept: FAMILY MEDICINE CLINIC | Facility: CLINIC | Age: 68
End: 2018-11-19

## 2018-11-19 DIAGNOSIS — Z01.818 PREOP TESTING: ICD-10-CM

## 2018-11-19 DIAGNOSIS — Z86.14 HISTORY OF MRSA INFECTION: Primary | ICD-10-CM

## 2018-11-19 DIAGNOSIS — E11.42 TYPE 2 DIABETES MELLITUS WITH DIABETIC POLYNEUROPATHY, WITHOUT LONG-TERM CURRENT USE OF INSULIN (HCC): ICD-10-CM

## 2018-11-19 NOTE — TELEPHONE ENCOUNTER
Spouse called, pt is having amputation of second toe on left foot this Saturday. Spouse states Dr. Canales Kin needs to dictate a history and physical to the hospital no sooner than 7:30 this Friday.    Any questions please call spouse at [phone]

## 2018-11-19 NOTE — TELEPHONE ENCOUNTER
Spoke with Jatin Grande at [facility] who states patient was schedule for surgery tomorrow but it has been cancelled due to patient needing a negative MRSA prior to surgery.      Spoke with patient wife who states patient surgery will now be at [facility]

## 2018-11-20 ENCOUNTER — TELEPHONE (OUTPATIENT)
Dept: FAMILY MEDICINE CLINIC | Facility: CLINIC | Age: 68
End: 2018-11-20

## 2018-11-20 ENCOUNTER — NURSE ONLY (OUTPATIENT)
Dept: FAMILY MEDICINE CLINIC | Facility: CLINIC | Age: 68
End: 2018-11-20
Payer: MEDICARE

## 2018-11-20 ENCOUNTER — APPOINTMENT (OUTPATIENT)
Dept: HEMATOLOGY/ONCOLOGY | Age: 68
End: 2018-11-20
Payer: MEDICARE

## 2018-11-20 DIAGNOSIS — Z86.14 HISTORY OF MRSA INFECTION: ICD-10-CM

## 2018-11-20 DIAGNOSIS — Z01.818 PREOP TESTING: ICD-10-CM

## 2018-11-20 DIAGNOSIS — E11.42 TYPE 2 DIABETES MELLITUS WITH DIABETIC POLYNEUROPATHY, WITHOUT LONG-TERM CURRENT USE OF INSULIN (HCC): ICD-10-CM

## 2018-11-20 PROCEDURE — 87081 CULTURE SCREEN ONLY: CPT | Performed by: FAMILY MEDICINE

## 2018-11-20 RX ORDER — SODIUM CHLORIDE, SODIUM LACTATE, POTASSIUM CHLORIDE, CALCIUM CHLORIDE 600; 310; 30; 20 MG/100ML; MG/100ML; MG/100ML; MG/100ML
INJECTION, SOLUTION INTRAVENOUS CONTINUOUS
Status: CANCELLED | OUTPATIENT
Start: 2018-11-20

## 2018-11-20 NOTE — TELEPHONE ENCOUNTER
Spoke with Bharat Weldon in preadmission. States 11/12/18 preop ok  Patient needs MRSA swab  preop orders from Dr Jonathon Dempsey call for [facility] and [facility]. Todd Rousseau, orders entered per Dr Radha Delgadillo were done at preop, [facility] and [facility].     Bharat Weldon will contact Dr Radha Delgadillo t

## 2018-11-20 NOTE — TELEPHONE ENCOUNTER
Spoke with Lilly Fortune at Dr Dolores Camacho office who confirms patient is having surgery Saturday at [address] per [facility] preadmission, Dr Pardo Friday needs to dictate an H&P no sooner that 24 hours prior to surgery.     Left message on preadmit voicemail r

## 2018-11-21 ENCOUNTER — ANESTHESIA EVENT (OUTPATIENT)
Dept: SURGERY | Facility: HOSPITAL | Age: 68
End: 2018-11-21

## 2018-11-24 ENCOUNTER — ANESTHESIA (OUTPATIENT)
Dept: SURGERY | Facility: HOSPITAL | Age: 68
End: 2018-11-24

## 2018-11-24 ENCOUNTER — HOSPITAL ENCOUNTER (OUTPATIENT)
Facility: HOSPITAL | Age: 68
Setting detail: HOSPITAL OUTPATIENT SURGERY
Discharge: HOME | End: 2018-11-24
Attending: PODIATRIST | Admitting: PODIATRIST
Payer: MEDICARE

## 2018-11-24 VITALS
DIASTOLIC BLOOD PRESSURE: 77 MMHG | RESPIRATION RATE: 16 BRPM | BODY MASS INDEX: 28.66 KG/M2 | HEIGHT: 72 IN | TEMPERATURE: 98 F | SYSTOLIC BLOOD PRESSURE: 126 MMHG | WEIGHT: 211.63 LBS | OXYGEN SATURATION: 99 % | HEART RATE: 59 BPM

## 2018-11-24 DIAGNOSIS — M86.172 ACUTE OSTEOMYELITIS OF TOE, LEFT (HCC): ICD-10-CM

## 2018-11-24 PROCEDURE — 0Y6S0Z1 DETACHMENT AT LEFT 2ND TOE, HIGH, OPEN APPROACH: ICD-10-PCS | Performed by: PODIATRIST

## 2018-11-24 PROCEDURE — 87176 TISSUE HOMOGENIZATION CULTR: CPT | Performed by: PODIATRIST

## 2018-11-24 PROCEDURE — 87075 CULTR BACTERIA EXCEPT BLOOD: CPT | Performed by: PODIATRIST

## 2018-11-24 PROCEDURE — 87070 CULTURE OTHR SPECIMN AEROBIC: CPT | Performed by: PODIATRIST

## 2018-11-24 PROCEDURE — 87205 SMEAR GRAM STAIN: CPT | Performed by: PODIATRIST

## 2018-11-24 PROCEDURE — 87077 CULTURE AEROBIC IDENTIFY: CPT | Performed by: PODIATRIST

## 2018-11-24 PROCEDURE — 87186 SC STD MICRODIL/AGAR DIL: CPT | Performed by: PODIATRIST

## 2018-11-24 PROCEDURE — 82962 GLUCOSE BLOOD TEST: CPT

## 2018-11-24 RX ORDER — MORPHINE SULFATE 4 MG/ML
2 INJECTION, SOLUTION INTRAMUSCULAR; INTRAVENOUS EVERY 5 MIN PRN
Status: DISCONTINUED | OUTPATIENT
Start: 2018-11-24 | End: 2018-11-24

## 2018-11-24 RX ORDER — NALOXONE HYDROCHLORIDE 0.4 MG/ML
80 INJECTION, SOLUTION INTRAMUSCULAR; INTRAVENOUS; SUBCUTANEOUS AS NEEDED
Status: DISCONTINUED | OUTPATIENT
Start: 2018-11-24 | End: 2018-11-24

## 2018-11-24 RX ORDER — INSULIN ASPART 100 [IU]/ML
INJECTION, SOLUTION INTRAVENOUS; SUBCUTANEOUS ONCE
Status: DISCONTINUED | OUTPATIENT
Start: 2018-11-24 | End: 2018-11-24

## 2018-11-24 RX ORDER — MAGNESIUM HYDROXIDE 1200 MG/15ML
LIQUID ORAL CONTINUOUS PRN
Status: DISCONTINUED | OUTPATIENT
Start: 2018-11-24 | End: 2018-11-24

## 2018-11-24 RX ORDER — ACETAMINOPHEN 500 MG
1000 TABLET ORAL ONCE AS NEEDED
Status: DISCONTINUED | OUTPATIENT
Start: 2018-11-24 | End: 2018-11-24

## 2018-11-24 RX ORDER — ONDANSETRON 2 MG/ML
4 INJECTION INTRAMUSCULAR; INTRAVENOUS AS NEEDED
Status: DISCONTINUED | OUTPATIENT
Start: 2018-11-24 | End: 2018-11-24

## 2018-11-24 RX ORDER — HYDROCODONE BITARTRATE AND ACETAMINOPHEN 5; 325 MG/1; MG/1
2 TABLET ORAL AS NEEDED
Status: DISCONTINUED | OUTPATIENT
Start: 2018-11-24 | End: 2018-11-24

## 2018-11-24 RX ORDER — CEFAZOLIN SODIUM/WATER 2 G/20 ML
2 SYRINGE (ML) INTRAVENOUS ONCE
Status: COMPLETED | OUTPATIENT
Start: 2018-11-24 | End: 2018-11-24

## 2018-11-24 RX ORDER — METOCLOPRAMIDE HYDROCHLORIDE 5 MG/ML
10 INJECTION INTRAMUSCULAR; INTRAVENOUS AS NEEDED
Status: DISCONTINUED | OUTPATIENT
Start: 2018-11-24 | End: 2018-11-24

## 2018-11-24 RX ORDER — SODIUM CHLORIDE, SODIUM LACTATE, POTASSIUM CHLORIDE, CALCIUM CHLORIDE 600; 310; 30; 20 MG/100ML; MG/100ML; MG/100ML; MG/100ML
INJECTION, SOLUTION INTRAVENOUS CONTINUOUS
Status: DISCONTINUED | OUTPATIENT
Start: 2018-11-24 | End: 2018-11-24

## 2018-11-24 RX ORDER — DEXTROSE MONOHYDRATE 25 G/50ML
50 INJECTION, SOLUTION INTRAVENOUS
Status: DISCONTINUED | OUTPATIENT
Start: 2018-11-24 | End: 2018-11-24 | Stop reason: HOSPADM

## 2018-11-24 RX ORDER — LIDOCAINE HYDROCHLORIDE 10 MG/ML
INJECTION, SOLUTION INFILTRATION; PERINEURAL AS NEEDED
Status: DISCONTINUED | OUTPATIENT
Start: 2018-11-24 | End: 2018-11-24 | Stop reason: HOSPADM

## 2018-11-24 RX ORDER — DEXTROSE MONOHYDRATE 25 G/50ML
50 INJECTION, SOLUTION INTRAVENOUS
Status: DISCONTINUED | OUTPATIENT
Start: 2018-11-24 | End: 2018-11-24

## 2018-11-24 RX ORDER — BUPIVACAINE HYDROCHLORIDE 5 MG/ML
INJECTION, SOLUTION EPIDURAL; INTRACAUDAL AS NEEDED
Status: DISCONTINUED | OUTPATIENT
Start: 2018-11-24 | End: 2018-11-24 | Stop reason: HOSPADM

## 2018-11-24 RX ORDER — HYDROCODONE BITARTRATE AND ACETAMINOPHEN 5; 325 MG/1; MG/1
1 TABLET ORAL AS NEEDED
Status: DISCONTINUED | OUTPATIENT
Start: 2018-11-24 | End: 2018-11-24

## 2018-11-24 NOTE — ANESTHESIA POSTPROCEDURE EVALUATION
[facility] Patient Status:  Hospital Outpatient Surgery   Age/Gender 76year old male MRN [id number]   [facility] SURGERY Attending Ciara Figueroa, [address] Day # 0 PCP Stan Robert DO       Anesthesia Post-op No

## 2018-11-24 NOTE — OPERATIVE REPORT
[facility]    PATIENT'S NAME: Michelle Painter   ATTENDING PHYSICIAN: Emeterio Khan D.P.M. OPERATING PHYSICIAN: Emeterio Khan D.P.M.    PATIENT ACCOUNT#:   [de-identified]    LOCATION:  PREOPASCC EH PRE ASCC 1 EDWP 10  MEDICAL RECORD #:   [id number] culture. At this point, antibiotic was administered. An [employer] pickup and a 15 blade were used to peel back the extensor digitorum longus tendon that remained on the proximal phalanx.   An oscillating saw was then used to resect the head and neck of

## 2018-11-24 NOTE — ANESTHESIA PREPROCEDURE EVALUATION
PRE-OP EVALUATION    Patient Name: Adam Randall    Pre-op Diagnosis: Acute osteomyelitis of toe, left ([facility] [M26.172]    Procedure(s):  PARTIAL AMPUTATION SECOND TOE LEFT FOOT    Surgeon(s) and Role:     * Chon Aleman, BUSTER - Primary    Pre-op vit Fenofibrate 48 MG Oral Tab Take 67 mg by mouth daily. Disp:  Rfl:    multiple vitamin (MVI) Oral Chew Tab Chew 1 tablet by mouth daily. Disp:  Rfl:        Allergies: Patient has no known allergies. Anesthesia Evaluation    Patient summary reviewed. Visual impairment Renal disorder  Hyperlipidemia Essential hypertension  Peripheral vascular disease [facility])             Past Surgical History:   Procedure Laterality Date   • CAROTID ENDARTERECTOMY Right 4/14/05   • CATH TRANSCATHETER AORTIC VALVE REPLACEME Neck ROM: full Cardiovascular      Rhythm: regular  Rate: normal     Dental    No notable dental history. Pulmonary      Breath sounds clear to auscultation bilaterally.                Other findings            ASA: 3   Plan: MAC  NPO status verifie

## 2018-11-24 NOTE — BRIEF OP NOTE
Pre-Operative Diagnosis: Acute osteomyelitis of toe, left ([facility]) [M86.172]     Post-Operative Diagnosis: Acute osteomyelitis of toe, left ([address] [K98.443]      Procedure Performed:   Procedure(s):  PARTIAL AMPUTATION SECOND TOE LEFT FOOT    Surgeon(s) and Crystal

## 2018-12-07 RX ORDER — AMLODIPINE BESYLATE 10 MG/1
TABLET ORAL
Qty: 30 TABLET | Refills: 0 | OUTPATIENT
Start: 2018-12-07

## 2018-12-10 ENCOUNTER — OFFICE VISIT (OUTPATIENT)
Dept: HEMATOLOGY/ONCOLOGY | Facility: HOSPITAL | Age: 68
End: 2018-12-10
Attending: FAMILY MEDICINE
Payer: MEDICARE

## 2018-12-10 VITALS
WEIGHT: 214 LBS | RESPIRATION RATE: 18 BRPM | SYSTOLIC BLOOD PRESSURE: 138 MMHG | TEMPERATURE: 98 F | HEIGHT: 70.98 IN | BODY MASS INDEX: 29.96 KG/M2 | DIASTOLIC BLOOD PRESSURE: 85 MMHG | OXYGEN SATURATION: 97 % | HEART RATE: 76 BPM

## 2018-12-10 DIAGNOSIS — R91.1 LUNG NODULE: Primary | ICD-10-CM

## 2018-12-10 DIAGNOSIS — D69.6 THROMBOCYTOPENIA (HCC): ICD-10-CM

## 2018-12-10 DIAGNOSIS — C18.7 CANCER OF SIGMOID COLON (HCC): ICD-10-CM

## 2018-12-10 PROCEDURE — 99214 OFFICE O/P EST MOD 30 MIN: CPT | Performed by: INTERNAL MEDICINE

## 2018-12-10 NOTE — PROGRESS NOTES
[facility] Progress Note    Patient Name: Boni Hodgkin   YOB: 1950   Medical Record Number: [id number]   CSN: [id number]   Attending Physician: Rosario Silverman M.D.    Referring Physician: Carline Nava DO      Date of Visit: 12/10/2018 CKD    5. HTN    6. Chronic thrombocytopenia (since 1/2017)- could be sequestration from liver disease  (seen on imaging)      History of Present Illness:   Underwent partial amputation of the second toe of the left foot 11/24/18 for osteomyelitis.  Doing o stenosis    • Arrhythmia     afib   • Cancer ([facility] 2015    colon CA   • CKD (chronic kidney disease) stage 3, GFR 30-59 ml/min (HCC)    • Colon cancer ([facility]     Dx in 2015: tx with colon resection   • Diabetes ([email or web address]    • Diabetic peripheral neuropathy ([email or web address]. TOE AMPUTATION Left 11/24/2018    Performed by Raad Dc DPM at [facility] MAIN OR   • TOE AMPUTATION Left 5/21/2016    Performed by Raad Dc DPM at [facility] MAIN OR   • UPPER GI ENDOSCOPY,EXAM         Family Medical History:  Family History   Problem lymphadenopathy. Neck is supple. Lymphatics: There is no palpable lymphadenopathy   Chest: Clear to auscultation. Heart: Regular rate and rhythm. +murmur  Abdomen: Soft, non tender with good bowel sounds. No hepatosplenomegaly. No palpable mass.  Incisi scanning is performed through the chest, abdomen, and pelvis. Dose reduction techniques were used. Dose information is transmitted to the ACR [facility] [employer])   NRDR ([address]) which includes the Dose Index Registry. MD Moe         Impression and Plan:  1. Colon cancer- Clinically MARSHA and doing well from a malignancy standpoint. Was due for a colonoscopy in September but got deferred given toe surgery for osteo. He promises to have this scheduled.   Due for scans in [place]

## 2018-12-21 PROBLEM — E27.8 ADRENAL NODULE (HCC): Status: ACTIVE | Noted: 2018-12-21

## 2018-12-21 PROBLEM — I77.9 CAROTID ARTERY DISORDER (HCC): Status: ACTIVE | Noted: 2018-12-21

## 2018-12-21 PROBLEM — J84.10 LUNG GRANULOMA (HCC): Status: ACTIVE | Noted: 2018-12-21

## 2018-12-21 PROBLEM — I70.0 AORTIC CALCIFICATION (HCC): Status: ACTIVE | Noted: 2018-12-21

## 2019-01-15 ENCOUNTER — TELEPHONE (OUTPATIENT)
Dept: FAMILY MEDICINE CLINIC | Facility: CLINIC | Age: 69
End: 2019-01-15

## 2019-01-15 NOTE — TELEPHONE ENCOUNTER
Patient dropped off his portion of [employer] assistance program for lyrica.   Dr Hubert Bill completed her portion   Forms faxed to Cedric Ochoa for [employer]

## 2019-01-16 ENCOUNTER — TELEPHONE (OUTPATIENT)
Dept: FAMILY MEDICINE CLINIC | Facility: CLINIC | Age: 69
End: 2019-01-16

## 2019-01-17 ENCOUNTER — TELEPHONE (OUTPATIENT)
Dept: FAMILY MEDICINE CLINIC | Facility: CLINIC | Age: 69
End: 2019-01-17

## 2019-01-17 NOTE — TELEPHONE ENCOUNTER
Left voice mail message for pt to call our office. Need to set up Medicare Super Visit. Waiting call back from pt.

## 2019-01-23 ENCOUNTER — PATIENT MESSAGE (OUTPATIENT)
Dept: FAMILY MEDICINE CLINIC | Facility: CLINIC | Age: 69
End: 2019-01-23

## 2019-01-23 DIAGNOSIS — E11.42 TYPE 2 DIABETES MELLITUS WITH DIABETIC POLYNEUROPATHY, WITHOUT LONG-TERM CURRENT USE OF INSULIN (HCC): ICD-10-CM

## 2019-01-23 NOTE — TELEPHONE ENCOUNTER
From: Judit Washington  To: Lindsey Ayers DO  Sent: 1/23/2019 11:26 AM CST  Subject: Prescription Question    Hi Dr. Hubert Bill:    I just talked to someone at [employer]. Apparently, I printed out the wrong forms for Lyrica.  I'll print out the right ones, and

## 2019-01-24 RX ORDER — PREGABALIN 100 MG/1
CAPSULE ORAL
Qty: 90 CAPSULE | Refills: 0 | Status: SHIPPED
Start: 2019-01-24 | End: 2019-02-04

## 2019-02-04 ENCOUNTER — TELEPHONE (OUTPATIENT)
Dept: FAMILY MEDICINE CLINIC | Facility: CLINIC | Age: 69
End: 2019-02-04

## 2019-02-04 DIAGNOSIS — E11.42 TYPE 2 DIABETES MELLITUS WITH DIABETIC POLYNEUROPATHY, WITHOUT LONG-TERM CURRENT USE OF INSULIN (HCC): ICD-10-CM

## 2019-02-04 RX ORDER — PREGABALIN 100 MG/1
CAPSULE ORAL
Qty: 270 CAPSULE | Refills: 1 | Status: SHIPPED | OUTPATIENT
Start: 2019-02-04 | End: 2019-07-29

## 2019-02-04 NOTE — TELEPHONE ENCOUNTER
Patient wife brought updated forms to office for [employer]. Needs to be faxed to assistance program along with new prescription.   Fax to [phone]

## 2019-02-08 ENCOUNTER — OFFICE VISIT (OUTPATIENT)
Dept: FAMILY MEDICINE CLINIC | Facility: CLINIC | Age: 69
End: 2019-02-08
Payer: MEDICARE

## 2019-02-08 VITALS
SYSTOLIC BLOOD PRESSURE: 124 MMHG | HEIGHT: 70.98 IN | TEMPERATURE: 98 F | DIASTOLIC BLOOD PRESSURE: 78 MMHG | HEART RATE: 72 BPM | BODY MASS INDEX: 29.96 KG/M2 | WEIGHT: 214 LBS | OXYGEN SATURATION: 98 %

## 2019-02-08 DIAGNOSIS — L97.509 FOOT ULCER DUE TO SECONDARY DM (HCC): ICD-10-CM

## 2019-02-08 DIAGNOSIS — D69.6 THROMBOCYTOPENIA (HCC): Chronic | ICD-10-CM

## 2019-02-08 DIAGNOSIS — E27.8 ADRENAL NODULE (HCC): ICD-10-CM

## 2019-02-08 DIAGNOSIS — M20.42 HAMMER TOE OF LEFT FOOT: ICD-10-CM

## 2019-02-08 DIAGNOSIS — N18.4 CKD (CHRONIC KIDNEY DISEASE) STAGE 4, GFR 15-29 ML/MIN (HCC): ICD-10-CM

## 2019-02-08 DIAGNOSIS — Z00.00 ENCOUNTER FOR ANNUAL HEALTH EXAMINATION: Primary | ICD-10-CM

## 2019-02-08 DIAGNOSIS — Z11.59 NEED FOR HEPATITIS C SCREENING TEST: ICD-10-CM

## 2019-02-08 DIAGNOSIS — Z91.81 AT RISK FOR FALLING: ICD-10-CM

## 2019-02-08 DIAGNOSIS — I35.0 NONRHEUMATIC AORTIC VALVE STENOSIS: ICD-10-CM

## 2019-02-08 DIAGNOSIS — E11.42 TYPE 2 DIABETES MELLITUS WITH DIABETIC POLYNEUROPATHY, WITHOUT LONG-TERM CURRENT USE OF INSULIN (HCC): ICD-10-CM

## 2019-02-08 DIAGNOSIS — Z87.39 HISTORY OF OSTEOMYELITIS: ICD-10-CM

## 2019-02-08 DIAGNOSIS — Z12.5 SCREENING FOR MALIGNANT NEOPLASM OF PROSTATE: ICD-10-CM

## 2019-02-08 DIAGNOSIS — I10 ESSENTIAL HYPERTENSION: ICD-10-CM

## 2019-02-08 DIAGNOSIS — E13.621 FOOT ULCER DUE TO SECONDARY DM (HCC): ICD-10-CM

## 2019-02-08 DIAGNOSIS — I70.0 AORTIC CALCIFICATION (HCC): ICD-10-CM

## 2019-02-08 DIAGNOSIS — H25.9 SENILE CATARACT OF LEFT EYE, UNSPECIFIED AGE-RELATED CATARACT TYPE: ICD-10-CM

## 2019-02-08 DIAGNOSIS — I73.9 PVD (PERIPHERAL VASCULAR DISEASE) (HCC): ICD-10-CM

## 2019-02-08 DIAGNOSIS — I25.10 CORONARY ARTERY DISEASE INVOLVING NATIVE CORONARY ARTERY OF NATIVE HEART WITHOUT ANGINA PECTORIS: ICD-10-CM

## 2019-02-08 DIAGNOSIS — Z95.2 S/P TAVR (TRANSCATHETER AORTIC VALVE REPLACEMENT): ICD-10-CM

## 2019-02-08 DIAGNOSIS — E11.40 CHRONIC PAINFUL DIABETIC NEUROPATHY (HCC): ICD-10-CM

## 2019-02-08 DIAGNOSIS — Z86.79 HISTORY OF ATRIAL FIBRILLATION: ICD-10-CM

## 2019-02-08 DIAGNOSIS — C18.7 CANCER OF SIGMOID COLON (HCC): ICD-10-CM

## 2019-02-08 DIAGNOSIS — Z28.21 IMMUNIZATION NOT CARRIED OUT BECAUSE OF PATIENT REFUSAL: ICD-10-CM

## 2019-02-08 DIAGNOSIS — Z89.412 STATUS POST AMPUTATION OF LEFT GREAT TOE (HCC): ICD-10-CM

## 2019-02-08 DIAGNOSIS — J84.10 LUNG GRANULOMA (HCC): ICD-10-CM

## 2019-02-08 DIAGNOSIS — I77.9 CAROTID ARTERY DISORDER (HCC): ICD-10-CM

## 2019-02-08 PROCEDURE — 96160 PT-FOCUSED HLTH RISK ASSMT: CPT | Performed by: FAMILY MEDICINE

## 2019-02-08 PROCEDURE — 99397 PER PM REEVAL EST PAT 65+ YR: CPT | Performed by: FAMILY MEDICINE

## 2019-02-08 PROCEDURE — G0439 PPPS, SUBSEQ VISIT: HCPCS | Performed by: FAMILY MEDICINE

## 2019-02-08 NOTE — PROGRESS NOTES
HPI:   Yonis Ku is a 76year old male who presents for a MA (Medicare Advantage) [facility] (Once per calendar year). Needs refills: metoprolol, [facility]. Cataract: having cataract surgery with Jann Abreu in April.  Had the other depressed, or hopeless (over the last two weeks)?: Not at all  PHQ-2 SCORE: 0     Advanced Directive:   He does NOT have a Living Will on file in [facility] [address]    Advance care planning including the explanation and discussion of advance directives standard forms pe disease) stage 4, GFR 15-29 ml/min (HCC)     Chronic painful diabetic neuropathy (HCC)     Foot ulcer due to secondary DM ([address]     Age-related cataract of left eye     Adrenal nodule (HCC)     Lung granuloma ([address]     Aortic calcification ([address]     Alice Hernandez MOUTH EVERY MORNING BEFORE BREAKFAST   Fenofibrate 48 MG Oral Tab Take 67 mg by mouth daily. Multiple Vitamins-Minerals (CENTRUM SILVER OR) Chew 1 tablet by mouth daily. aspirin 81 MG Oral Tab Take 81 mg by mouth daily.         MEDICAL INFORMATION:   He reports that he does not use drugs.      REVIEW OF SYSTEMS:   GENERAL: feels well otherwise, no new concerns today   SKIN: denies any unusual skin lesions, sores on feet are healed   EYES: denies blurred vision or double vision  HEENT: denies nasal congesti tongue normal; teeth and gums normal   Neck: Supple, symmetrical, trachea midline, no adenopathy, thyroid: not enlarged, symmetric, no tenderness/mass/nodules, no carotid bruit or JVD   Back:   Symmetric, no curvature, ROM normal, no CVA tenderness   Lungs for hepatitis C screening test  Check with next labs. - HCV ANTIBODY; Future    4.  Type 2 diabetes mellitus with diabetic polyneuropathy, without long-term current use of insulin (HCC)  Check fasting labs, too soon for A1c today, will come back next week 17. CKD (chronic kidney disease) stage 4, GFR 15-29 ml/min ([facility])  Following with Dr. Alethea Coates, creat has been generally stable. 18. Thrombocytopenia ([facility]  Following with Dr. Nelson Alfaro, level has been stable lately.      19. Status post amputation of left No flowsheet data found.     Fasting Blood Sugar (FSB)Annually Glucose (mg/dL)   Date Value   12/10/2018 129 (H)       Cardiovascular Disease Screening     LDL Annually LDL Cholesterol (mg/dL)   Date Value   01/22/2018      Comment:     Unable to calculate prescription benefits, but Medicare does not cover unless Medically needed    Zoster   Not covered by Medicare Part B No vaccine history found This may be covered with your pharmacy  prescription benefits      [address] [facility] Internal Lab or Pr

## 2019-02-08 NOTE — PATIENT INSTRUCTIONS
Ashley Ariza's SCREENING SCHEDULE   Tests on this list are recommended by your physician but may not be covered, or covered at this frequency, by your insurer. Please check with your insurance carrier before scheduling to verify coverage.     Cathleen Mejia cigarettes in their lifetime   • Anyone with a family history    Colorectal Cancer Screening Covered up to Age 76     Colonoscopy Screen   Covered every 10 years- more often if abnormal Colonoscopy due on 09/01/2027 Update Health Maintenance if applicable mentally retarded   Persons who live in the same house as a HepB virus carrier   Homosexual men   Illicit injectable drug abusers     Tetanus Toxoid- Only covered with a cut with metal- TD and TDaP Not covered by Medicare Part B) No orders found for this o

## 2019-02-09 RX ORDER — GLIPIZIDE 10 MG/1
10 TABLET, FILM COATED, EXTENDED RELEASE ORAL DAILY
Qty: 90 TABLET | Refills: 1 | Status: SHIPPED | OUTPATIENT
Start: 2019-02-09 | End: 2019-09-08

## 2019-02-09 RX ORDER — METOPROLOL TARTRATE 50 MG/1
TABLET, FILM COATED ORAL
Qty: 180 TABLET | Refills: 1 | Status: SHIPPED | OUTPATIENT
Start: 2019-02-09 | End: 2019-08-13

## 2019-02-15 ENCOUNTER — NURSE ONLY (OUTPATIENT)
Dept: FAMILY MEDICINE CLINIC | Facility: CLINIC | Age: 69
End: 2019-02-15
Payer: MEDICARE

## 2019-02-15 DIAGNOSIS — Z11.59 NEED FOR HEPATITIS C SCREENING TEST: ICD-10-CM

## 2019-02-15 DIAGNOSIS — Z12.5 SCREENING FOR MALIGNANT NEOPLASM OF PROSTATE: ICD-10-CM

## 2019-02-15 DIAGNOSIS — E11.42 TYPE 2 DIABETES MELLITUS WITH DIABETIC POLYNEUROPATHY, WITHOUT LONG-TERM CURRENT USE OF INSULIN (HCC): ICD-10-CM

## 2019-02-15 LAB
CHOLEST SMN-MCNC: 108 MG/DL (ref ?–200)
COMPLEXED PSA SERPL-MCNC: 1.26 NG/ML (ref ?–4)
EST. AVERAGE GLUCOSE BLD GHB EST-MCNC: 157 MG/DL (ref 68–126)
HBA1C MFR BLD HPLC: 7.1 % (ref ?–5.7)
HCV AB SERPL QL IA: NONREACTIVE
HDLC SERPL-MCNC: 32 MG/DL (ref 40–59)
LDLC SERPL DIRECT ASSAY-MCNC: 28 MG/DL (ref ?–100)
NONHDLC SERPL-MCNC: 76 MG/DL (ref ?–130)
TRIGL SERPL-MCNC: 597 MG/DL (ref 30–149)

## 2019-02-15 PROCEDURE — 80061 LIPID PANEL: CPT | Performed by: FAMILY MEDICINE

## 2019-02-15 PROCEDURE — 83036 HEMOGLOBIN GLYCOSYLATED A1C: CPT | Performed by: FAMILY MEDICINE

## 2019-02-15 PROCEDURE — 86803 HEPATITIS C AB TEST: CPT | Performed by: FAMILY MEDICINE

## 2019-02-15 PROCEDURE — 83721 ASSAY OF BLOOD LIPOPROTEIN: CPT | Performed by: FAMILY MEDICINE

## 2019-02-15 PROCEDURE — 36415 COLL VENOUS BLD VENIPUNCTURE: CPT | Performed by: FAMILY MEDICINE

## 2019-02-15 NOTE — PROGRESS NOTES
Gold, mint and lav tubes drawn from right arm with 23g butterfly needle x1. Pt melissa well.  Pt left the clinic in stable condition

## 2019-02-16 DIAGNOSIS — E11.42 TYPE 2 DIABETES MELLITUS WITH DIABETIC POLYNEUROPATHY, WITHOUT LONG-TERM CURRENT USE OF INSULIN (HCC): Primary | ICD-10-CM

## 2019-02-28 VITALS
HEIGHT: 72 IN | HEART RATE: 70 BPM | WEIGHT: 208 LBS | BODY MASS INDEX: 28.17 KG/M2 | DIASTOLIC BLOOD PRESSURE: 68 MMHG | SYSTOLIC BLOOD PRESSURE: 140 MMHG

## 2019-02-28 VITALS
HEART RATE: 69 BPM | WEIGHT: 212 LBS | BODY MASS INDEX: 28.71 KG/M2 | DIASTOLIC BLOOD PRESSURE: 90 MMHG | HEIGHT: 72 IN | SYSTOLIC BLOOD PRESSURE: 130 MMHG

## 2019-02-28 VITALS — HEART RATE: 74 BPM | SYSTOLIC BLOOD PRESSURE: 162 MMHG | DIASTOLIC BLOOD PRESSURE: 84 MMHG | HEIGHT: 72 IN

## 2019-02-28 VITALS — SYSTOLIC BLOOD PRESSURE: 138 MMHG | DIASTOLIC BLOOD PRESSURE: 82 MMHG | HEART RATE: 80 BPM

## 2019-03-01 VITALS
SYSTOLIC BLOOD PRESSURE: 148 MMHG | DIASTOLIC BLOOD PRESSURE: 84 MMHG | BODY MASS INDEX: 26.28 KG/M2 | HEIGHT: 72 IN | WEIGHT: 194 LBS | HEART RATE: 72 BPM

## 2019-03-01 VITALS
HEART RATE: 60 BPM | BODY MASS INDEX: 27.22 KG/M2 | WEIGHT: 201 LBS | SYSTOLIC BLOOD PRESSURE: 132 MMHG | DIASTOLIC BLOOD PRESSURE: 64 MMHG | HEIGHT: 72 IN

## 2019-03-13 ENCOUNTER — PATIENT MESSAGE (OUTPATIENT)
Dept: FAMILY MEDICINE CLINIC | Facility: CLINIC | Age: 69
End: 2019-03-13

## 2019-03-13 NOTE — TELEPHONE ENCOUNTER
From: Johnny Forward  To: Faimlia Giles DO  Sent: 3/13/2019 9:17 AM CDT  Subject: Referral Request    Dear Dr. Christian Del Toro:    Arsenio Abreu 28-) has an appointment with Dr. Eduin La on April 22nd. After that he will be seeing him for a colonoscopy.  He

## 2019-03-18 RX ORDER — PANTOPRAZOLE SODIUM 40 MG/1
TABLET, DELAYED RELEASE ORAL
Qty: 90 TABLET | Refills: 0 | Status: SHIPPED | OUTPATIENT
Start: 2019-03-18 | End: 2019-06-19

## 2019-03-20 RX ORDER — GLIPIZIDE 5 MG/1
10 TABLET ORAL DAILY
COMMUNITY

## 2019-03-20 RX ORDER — FENOFIBRATE 67 MG/1
CAPSULE ORAL
COMMUNITY
End: 2019-04-03 | Stop reason: SDUPTHER

## 2019-03-20 RX ORDER — AMOXICILLIN 500 MG
CAPSULE ORAL
COMMUNITY
Start: 2017-11-15 | End: 2019-04-17 | Stop reason: ALTCHOICE

## 2019-03-20 RX ORDER — AMLODIPINE BESYLATE 10 MG/1
TABLET ORAL
COMMUNITY
Start: 2017-11-15 | End: 2019-04-17 | Stop reason: SDUPTHER

## 2019-03-20 RX ORDER — ATORVASTATIN CALCIUM 10 MG/1
TABLET, FILM COATED ORAL
COMMUNITY
Start: 2017-02-15 | End: 2019-04-17 | Stop reason: SDUPTHER

## 2019-03-20 RX ORDER — PREGABALIN 100 MG/1
1 CAPSULE ORAL 3 TIMES DAILY
COMMUNITY
Start: 2017-12-20

## 2019-03-20 RX ORDER — MULTIVITAMIN
1 TABLET ORAL DAILY
COMMUNITY
Start: 2016-01-29

## 2019-03-20 RX ORDER — HYDRALAZINE HYDROCHLORIDE 25 MG/1
TABLET, FILM COATED ORAL
COMMUNITY
Start: 2017-11-15 | End: 2019-04-17 | Stop reason: SDUPTHER

## 2019-04-02 ENCOUNTER — OFFICE VISIT (OUTPATIENT)
Dept: FAMILY MEDICINE CLINIC | Facility: CLINIC | Age: 69
End: 2019-04-02
Payer: MEDICARE

## 2019-04-02 VITALS
RESPIRATION RATE: 16 BRPM | HEART RATE: 77 BPM | OXYGEN SATURATION: 98 % | TEMPERATURE: 98 F | SYSTOLIC BLOOD PRESSURE: 138 MMHG | WEIGHT: 216 LBS | BODY MASS INDEX: 30.24 KG/M2 | DIASTOLIC BLOOD PRESSURE: 80 MMHG | HEIGHT: 71 IN

## 2019-04-02 DIAGNOSIS — E11.42 TYPE 2 DIABETES MELLITUS WITH DIABETIC POLYNEUROPATHY, WITHOUT LONG-TERM CURRENT USE OF INSULIN (HCC): ICD-10-CM

## 2019-04-02 DIAGNOSIS — Z95.2 S/P TAVR (TRANSCATHETER AORTIC VALVE REPLACEMENT): ICD-10-CM

## 2019-04-02 DIAGNOSIS — I10 ESSENTIAL HYPERTENSION: ICD-10-CM

## 2019-04-02 DIAGNOSIS — Z01.818 PREOP EXAMINATION: Primary | ICD-10-CM

## 2019-04-02 PROCEDURE — 99214 OFFICE O/P EST MOD 30 MIN: CPT | Performed by: FAMILY MEDICINE

## 2019-04-02 RX ORDER — FENOFIBRATE 67 MG/1
1 CAPSULE ORAL DAILY
COMMUNITY
Start: 2019-04-02 | End: 2020-02-10 | Stop reason: ALTCHOICE

## 2019-04-02 NOTE — PROGRESS NOTES
Meghna Holland is a 76year old male.     CC:  Patient presents with:  Pre-Op Exam: cataract surgery per pt      HPI:  I am seeing Meghna Holland for preoperative evaluation at the request of Dr. Dakota Wang MD for my evaluation of the patient's Date   • Abdominal mass, right lower quadrant     kidney/abd area- colonoscopy scheduled 04/01/16   • Anesthesia complication     per wife, gets upset/mad after anesthesia sometimes   • Aortic stenosis    • Arrhythmia     afib   • Cancer ([facility] 2015    colo Performed by Tr Portillo MD at [facility] CVOR   • OTHER      amputation Left great toe due to gangrene   • OTHER SURGICAL HISTORY      colon resection   • OTHER SURGICAL HISTORY      On 2/2/2017: TAVR for aortic stenosis   • OTHER SURGICAL HISTORY  11/2018 regions  BREAST: not examined/not applicable  EXTREMITIES: No clubbing, cyanosis, trace edema  RECTAL: not examined  GENITAL: not examined  LYMPH: no supraclavicular nodes  MUSCULOSKELETAL: limping ambulation with a cane  NEURO: ---    ASSESSMENT AND PLAN

## 2019-04-03 RX ORDER — FENOFIBRATE 67 MG/1
CAPSULE ORAL
Qty: 90 CAPSULE | Refills: 0 | Status: SHIPPED | OUTPATIENT
Start: 2019-04-03 | End: 2019-07-03 | Stop reason: SDUPTHER

## 2019-04-17 ENCOUNTER — OFFICE VISIT (OUTPATIENT)
Dept: CARDIOLOGY | Facility: CLINIC | Age: 69
End: 2019-04-17

## 2019-04-17 VITALS
WEIGHT: 216 LBS | HEIGHT: 72 IN | HEART RATE: 77 BPM | BODY MASS INDEX: 29.26 KG/M2 | DIASTOLIC BLOOD PRESSURE: 72 MMHG | SYSTOLIC BLOOD PRESSURE: 132 MMHG

## 2019-04-17 DIAGNOSIS — I35.0 AORTIC VALVE STENOSIS, ETIOLOGY OF CARDIAC VALVE DISEASE UNSPECIFIED: ICD-10-CM

## 2019-04-17 DIAGNOSIS — I70.249 ATHEROSCLEROSIS OF NATIVE ARTERY OF BOTH LOWER EXTREMITIES WITH BILATERAL ULCERATION, UNSPECIFIED ULCERATION SITE (CMD): ICD-10-CM

## 2019-04-17 DIAGNOSIS — I25.10 CORONARY ARTERY DISEASE INVOLVING NATIVE CORONARY ARTERY OF NATIVE HEART WITHOUT ANGINA PECTORIS: ICD-10-CM

## 2019-04-17 DIAGNOSIS — I63.9 CEREBROVASCULAR ACCIDENT (CVA), UNSPECIFIED MECHANISM (CMD): Primary | ICD-10-CM

## 2019-04-17 DIAGNOSIS — E78.2 HYPERLIPIDEMIA, MIXED: ICD-10-CM

## 2019-04-17 DIAGNOSIS — Z95.2 S/P AORTIC VALVE REPLACEMENT WITH PROSTHETIC VALVE: ICD-10-CM

## 2019-04-17 DIAGNOSIS — I65.23 ASYMPTOMATIC CAROTID ARTERY STENOSIS, BILATERAL: ICD-10-CM

## 2019-04-17 DIAGNOSIS — I70.239 ATHEROSCLEROSIS OF NATIVE ARTERY OF BOTH LOWER EXTREMITIES WITH BILATERAL ULCERATION, UNSPECIFIED ULCERATION SITE (CMD): ICD-10-CM

## 2019-04-17 PROCEDURE — 99214 OFFICE O/P EST MOD 30 MIN: CPT | Performed by: INTERNAL MEDICINE

## 2019-04-17 RX ORDER — ATORVASTATIN CALCIUM 10 MG/1
10 TABLET, FILM COATED ORAL DAILY
Qty: 90 TABLET | Refills: 3 | OUTPATIENT
Start: 2019-04-17 | End: 2019-11-20 | Stop reason: SDUPTHER

## 2019-04-17 RX ORDER — AMLODIPINE BESYLATE 10 MG/1
10 TABLET ORAL DAILY
Qty: 90 TABLET | Refills: 3 | OUTPATIENT
Start: 2019-04-17

## 2019-04-17 RX ORDER — HYDRALAZINE HYDROCHLORIDE 25 MG/1
25 TABLET, FILM COATED ORAL 2 TIMES DAILY
Qty: 180 TABLET | Refills: 3 | OUTPATIENT
Start: 2019-04-17 | End: 2019-11-20 | Stop reason: SDUPTHER

## 2019-05-20 ENCOUNTER — TELEPHONE (OUTPATIENT)
Dept: FAMILY MEDICINE CLINIC | Facility: CLINIC | Age: 69
End: 2019-05-20

## 2019-05-20 NOTE — TELEPHONE ENCOUNTER
Letter mailed to patient reminding him he is due for labs.     Lab Frequency Next Occurrence   HEMOGLOBIN A1C Once 05/16/2019

## 2019-05-29 PROBLEM — Z85.038 PERSONAL HISTORY OF OTHER MALIGNANT NEOPLASM OF LARGE INTESTINE: Status: ACTIVE | Noted: 2019-05-29

## 2019-05-29 PROBLEM — K64.1 SECOND DEGREE HEMORRHOIDS: Status: ACTIVE | Noted: 2019-05-29

## 2019-05-29 PROBLEM — K63.5 POLYP OF COLON: Status: ACTIVE | Noted: 2019-05-29

## 2019-05-29 PROCEDURE — 88305 TISSUE EXAM BY PATHOLOGIST: CPT | Performed by: STUDENT IN AN ORGANIZED HEALTH CARE EDUCATION/TRAINING PROGRAM

## 2019-06-17 ENCOUNTER — NURSE ONLY (OUTPATIENT)
Dept: FAMILY MEDICINE CLINIC | Facility: CLINIC | Age: 69
End: 2019-06-17
Payer: MEDICARE

## 2019-06-17 DIAGNOSIS — E11.42 TYPE 2 DIABETES MELLITUS WITH DIABETIC POLYNEUROPATHY, WITHOUT LONG-TERM CURRENT USE OF INSULIN (HCC): ICD-10-CM

## 2019-06-17 PROCEDURE — 83036 HEMOGLOBIN GLYCOSYLATED A1C: CPT | Performed by: FAMILY MEDICINE

## 2019-06-18 DIAGNOSIS — E11.42 TYPE 2 DIABETES MELLITUS WITH DIABETIC POLYNEUROPATHY, WITHOUT LONG-TERM CURRENT USE OF INSULIN (HCC): Primary | ICD-10-CM

## 2019-06-19 RX ORDER — PANTOPRAZOLE SODIUM 40 MG/1
TABLET, DELAYED RELEASE ORAL
Qty: 90 TABLET | Refills: 0 | Status: SHIPPED | OUTPATIENT
Start: 2019-06-19 | End: 2019-09-23

## 2019-06-19 NOTE — TELEPHONE ENCOUNTER
Last refilled on 3/18/19 for # 90 with 0 refills  Last OV 2/8/19  Future Appointments   Date Time Provider Alexei Gwendolyn   6/20/2019  9:15 AM PF [facility] PF CT [place]   7/2/2019  1:00 PM Azucena Pink MD PF HEM ONC Holland        Thank you.

## 2019-06-20 ENCOUNTER — HOSPITAL ENCOUNTER (OUTPATIENT)
Dept: CT IMAGING | Age: 69
Discharge: HOME | End: 2019-06-20
Attending: INTERNAL MEDICINE
Payer: MEDICARE

## 2019-06-20 DIAGNOSIS — C18.7 CANCER OF SIGMOID COLON (HCC): ICD-10-CM

## 2019-06-20 PROCEDURE — 74176 CT ABD & PELVIS W/O CONTRAST: CPT | Performed by: INTERNAL MEDICINE

## 2019-06-20 PROCEDURE — 71250 CT THORAX DX C-: CPT | Performed by: INTERNAL MEDICINE

## 2019-07-02 ENCOUNTER — OFFICE VISIT (OUTPATIENT)
Dept: HEMATOLOGY/ONCOLOGY | Age: 69
End: 2019-07-02
Attending: INTERNAL MEDICINE
Payer: MEDICARE

## 2019-07-02 VITALS
BODY MASS INDEX: 29 KG/M2 | WEIGHT: 212.13 LBS | SYSTOLIC BLOOD PRESSURE: 132 MMHG | OXYGEN SATURATION: 94 % | TEMPERATURE: 98 F | RESPIRATION RATE: 18 BRPM | HEART RATE: 71 BPM | DIASTOLIC BLOOD PRESSURE: 68 MMHG

## 2019-07-02 DIAGNOSIS — D69.6 THROMBOCYTOPENIA (HCC): ICD-10-CM

## 2019-07-02 DIAGNOSIS — C18.7 CANCER OF SIGMOID COLON (HCC): ICD-10-CM

## 2019-07-02 DIAGNOSIS — R91.1 LUNG NODULE: Primary | ICD-10-CM

## 2019-07-02 LAB
ALBUMIN SERPL-MCNC: 3.9 G/DL (ref 3.4–5)
ALBUMIN/GLOB SERPL: 1 {RATIO} (ref 1–2)
ALP LIVER SERPL-CCNC: 66 U/L (ref 45–117)
ALT SERPL-CCNC: 25 U/L (ref 16–61)
ANION GAP SERPL CALC-SCNC: 7 MMOL/L (ref 0–18)
AST SERPL-CCNC: 17 U/L (ref 15–37)
BASOPHILS # BLD AUTO: 0.06 X10(3) UL (ref 0–0.2)
BASOPHILS NFR BLD AUTO: 0.8 %
BILIRUB SERPL-MCNC: 0.6 MG/DL (ref 0.1–2)
BUN BLD-MCNC: 26 MG/DL (ref 7–18)
BUN/CREAT SERPL: 11.8 (ref 10–20)
CALCIUM BLD-MCNC: 9.4 MG/DL (ref 8.5–10.1)
CEA SERPL-MCNC: 1 NG/ML (ref ?–5)
CHLORIDE SERPL-SCNC: 108 MMOL/L (ref 98–112)
CO2 SERPL-SCNC: 23 MMOL/L (ref 21–32)
CREAT BLD-MCNC: 2.21 MG/DL (ref 0.7–1.3)
DEPRECATED RDW RBC AUTO: 43.2 FL (ref 35.1–46.3)
EOSINOPHIL # BLD AUTO: 0.3 X10(3) UL (ref 0–0.7)
EOSINOPHIL NFR BLD AUTO: 4.1 %
ERYTHROCYTE [DISTWIDTH] IN BLOOD BY AUTOMATED COUNT: 14.2 % (ref 11–15)
GLOBULIN PLAS-MCNC: 3.9 G/DL (ref 2.8–4.4)
GLUCOSE BLD-MCNC: 116 MG/DL (ref 70–99)
HCT VFR BLD AUTO: 41.8 % (ref 39–53)
HGB BLD-MCNC: 14.2 G/DL (ref 13–17.5)
IMM GRANULOCYTES # BLD AUTO: 0.02 X10(3) UL (ref 0–1)
IMM GRANULOCYTES NFR BLD: 0.3 %
LYMPHOCYTES # BLD AUTO: 1.79 X10(3) UL (ref 1–4)
LYMPHOCYTES NFR BLD AUTO: 24.3 %
M PROTEIN MFR SERPL ELPH: 7.8 G/DL (ref 6.4–8.2)
MCH RBC QN AUTO: 28.8 PG (ref 26–34)
MCHC RBC AUTO-ENTMCNC: 34 G/DL (ref 31–37)
MCV RBC AUTO: 84.8 FL (ref 80–100)
MONOCYTES # BLD AUTO: 0.64 X10(3) UL (ref 0.1–1)
MONOCYTES NFR BLD AUTO: 8.7 %
NEUTROPHILS # BLD AUTO: 4.56 X10 (3) UL (ref 1.5–7.7)
NEUTROPHILS # BLD AUTO: 4.56 X10(3) UL (ref 1.5–7.7)
NEUTROPHILS NFR BLD AUTO: 61.8 %
OSMOLALITY SERPL CALC.SUM OF ELEC: 292 MOSM/KG (ref 275–295)
PLATELET # BLD AUTO: 131 10(3)UL (ref 150–450)
POTASSIUM SERPL-SCNC: 4.4 MMOL/L (ref 3.5–5.1)
RBC # BLD AUTO: 4.93 X10(6)UL (ref 3.8–5.8)
SODIUM SERPL-SCNC: 138 MMOL/L (ref 136–145)
WBC # BLD AUTO: 7.4 X10(3) UL (ref 4–11)

## 2019-07-02 PROCEDURE — 99213 OFFICE O/P EST LOW 20 MIN: CPT | Performed by: INTERNAL MEDICINE

## 2019-07-02 NOTE — PROGRESS NOTES
Pt here for 6 month MD f/u. Energy level has been \"reasonable\", appetite is good. Pt had recent CT scan and colonoscopy. Denies pain. Pt has no further complaints.      Outpatient Oncology Care Plan  Problem list:  fatigue  knowledge deficit    Problems r

## 2019-07-02 NOTE — PROGRESS NOTES
[facility] Progress Note    Patient Name: Kendy Browning   YOB: 1950   Medical Record Number: [id number]   CSN: [id number]   Attending Physician: Christa Santo M.D.    Referring Physician: Denver San, DO      Date of Visit: 7/2/2019 imaging)    7. Underwent partial amputation of the second toe of the left foot 11/24/18 for osteomyelitis      History of Present Illness:   Had colonoscopy 5/2019 which showed tubular adenomas. Recommended repeat colonoscopy in 2 years.  Says he is doing f History:  Past Medical History:   Diagnosis Date   • Abdominal mass, right lower quadrant     kidney/abd area- colonoscopy scheduled 04/01/16   • Acute, but ill-defined, cerebrovascular disease    • Anesthesia complication     per wife, gets upset/mad afte ENDOSCOPY   • COLONOSCOPY N/A 4/1/2016    Performed by Hans Ghosh MD at [address] N/A 4/3/2016    Performed by Bell Holloway MD at [address]      March/2016   • HEART TRANS AORTIC VALVE REPLACE Days per week: Not on file        Minutes per session: Not on file      Stress: Not on file    Relationships      Social connections:        Talks on phone: Not on file        Gets together: Not on file        Attends [employer] service: Not on file 12/10/2018    .0 (L) 12/10/2018    MCV 84.5 12/10/2018    MCH 29.2 12/10/2018    MCHC 34.5 12/10/2018    RDW 13.7 12/10/2018    NEPRELIM 5.27 12/10/2018    NEPERCENT 64.7 12/10/2018    LYPERCENT 22.5 12/10/2018    MOPERCENT 8.0 12/10/2018    EOPERCE fissure. No new pulmonary opacity. MEDIASTINUM:  No mass or adenopathy. GIANCARLO:  No mass or adenopathy. CARDIAC:  Coronary artery calcium. T AVR change. PLEURA:  No mass or effusion. CHEST WALL:  No mass or axillary adenopathy.     AORTA:  Mildly soft/pasty. Takes imodium as needed for diarrhea. 3. Lung nodules- have been stable for >2 years. No further f/u imaging indicated. Labs reviewed. CEA has been low. Thrombocytopenia stable (felt to be from liver disease).  Known CKD    RTC 1 year wi

## 2019-07-05 RX ORDER — FENOFIBRATE 67 MG/1
CAPSULE ORAL
Qty: 90 CAPSULE | Refills: 1 | Status: SHIPPED | OUTPATIENT
Start: 2019-07-05 | End: 2019-11-29 | Stop reason: ALTCHOICE

## 2019-07-29 DIAGNOSIS — E11.42 TYPE 2 DIABETES MELLITUS WITH DIABETIC POLYNEUROPATHY, WITHOUT LONG-TERM CURRENT USE OF INSULIN (HCC): ICD-10-CM

## 2019-07-29 RX ORDER — PREGABALIN 100 MG/1
CAPSULE ORAL
Qty: 270 CAPSULE | Refills: 1 | Status: SHIPPED | OUTPATIENT
Start: 2019-07-29 | End: 2019-08-08

## 2019-07-29 NOTE — TELEPHONE ENCOUNTER
Patient brought forms to office for Lyrica assistance.     Need KE signature on form and a printed prescription faxed to [phone]

## 2019-08-07 ENCOUNTER — PATIENT MESSAGE (OUTPATIENT)
Dept: FAMILY MEDICINE CLINIC | Facility: CLINIC | Age: 69
End: 2019-08-07

## 2019-08-07 DIAGNOSIS — E11.42 TYPE 2 DIABETES MELLITUS WITH DIABETIC POLYNEUROPATHY, WITHOUT LONG-TERM CURRENT USE OF INSULIN (HCC): ICD-10-CM

## 2019-08-07 NOTE — TELEPHONE ENCOUNTER
From: Jodene Nyhan  To: Ana Roth DO  Sent: 8/7/2019 7:28 AM CDT  Subject: Prescription Question    Dear Dr. Darren Riggins,    Can you please write Jeremie David a prescription for Lyrica, and send it to the [place] at [facility] in [facility]?  The Lyrica fr

## 2019-08-08 RX ORDER — PREGABALIN 100 MG/1
CAPSULE ORAL
Qty: 90 CAPSULE | Refills: 0 | Status: SHIPPED
Start: 2019-08-08 | End: 2020-01-28

## 2019-08-13 DIAGNOSIS — I10 ESSENTIAL HYPERTENSION: ICD-10-CM

## 2019-08-13 DIAGNOSIS — I25.10 CORONARY ARTERY DISEASE INVOLVING NATIVE CORONARY ARTERY OF NATIVE HEART WITHOUT ANGINA PECTORIS: ICD-10-CM

## 2019-08-13 DIAGNOSIS — Z86.79 HISTORY OF ATRIAL FIBRILLATION: ICD-10-CM

## 2019-08-13 RX ORDER — METOPROLOL TARTRATE 50 MG/1
TABLET, FILM COATED ORAL
Qty: 180 TABLET | Refills: 0 | Status: SHIPPED | OUTPATIENT
Start: 2019-08-13 | End: 2019-11-10

## 2019-09-08 DIAGNOSIS — E11.42 TYPE 2 DIABETES MELLITUS WITH DIABETIC POLYNEUROPATHY, WITHOUT LONG-TERM CURRENT USE OF INSULIN (HCC): ICD-10-CM

## 2019-09-09 RX ORDER — GLIPIZIDE 10 MG/1
TABLET, FILM COATED, EXTENDED RELEASE ORAL
Qty: 90 TABLET | Refills: 0 | Status: SHIPPED | OUTPATIENT
Start: 2019-09-09 | End: 2019-12-09

## 2019-09-09 NOTE — TELEPHONE ENCOUNTER
Last refill: 2/9/19 #90 w/ 1 refill  Last OV: 2/8/19  Last A1C: 6/17/19, 6.8    No future appointments.

## 2019-09-23 RX ORDER — PANTOPRAZOLE SODIUM 40 MG/1
TABLET, DELAYED RELEASE ORAL
Qty: 90 TABLET | Refills: 0 | Status: SHIPPED | OUTPATIENT
Start: 2019-09-23 | End: 2019-12-16

## 2019-09-24 ENCOUNTER — TELEPHONE (OUTPATIENT)
Dept: FAMILY MEDICINE CLINIC | Facility: CLINIC | Age: 69
End: 2019-09-24

## 2019-09-24 NOTE — TELEPHONE ENCOUNTER
Letter emailed to patient reminding him he is due for labs    Lab Frequency Next Occurrence   HEMOGLOBIN A1C Once 09/18/2019

## 2019-09-27 ENCOUNTER — MED REC SCAN ONLY (OUTPATIENT)
Dept: FAMILY MEDICINE CLINIC | Facility: CLINIC | Age: 69
End: 2019-09-27

## 2019-10-01 ENCOUNTER — NURSE ONLY (OUTPATIENT)
Dept: FAMILY MEDICINE CLINIC | Facility: CLINIC | Age: 69
End: 2019-10-01
Payer: MEDICARE

## 2019-10-01 DIAGNOSIS — E11.42 TYPE 2 DIABETES MELLITUS WITH DIABETIC POLYNEUROPATHY, WITHOUT LONG-TERM CURRENT USE OF INSULIN (HCC): ICD-10-CM

## 2019-10-01 PROCEDURE — 83036 HEMOGLOBIN GLYCOSYLATED A1C: CPT | Performed by: FAMILY MEDICINE

## 2019-10-03 DIAGNOSIS — E11.42 TYPE 2 DIABETES MELLITUS WITH DIABETIC POLYNEUROPATHY, WITHOUT LONG-TERM CURRENT USE OF INSULIN (HCC): Primary | ICD-10-CM

## 2019-10-08 RX ORDER — AMLODIPINE BESYLATE 10 MG/1
TABLET ORAL
Qty: 90 TABLET | Refills: 0 | Status: SHIPPED | OUTPATIENT
Start: 2019-10-08 | End: 2020-01-06

## 2019-10-08 RX ORDER — HYDRALAZINE HYDROCHLORIDE 25 MG/1
TABLET, FILM COATED ORAL
Qty: 180 TABLET | Refills: 0 | Status: SHIPPED | OUTPATIENT
Start: 2019-10-08 | End: 2020-01-06

## 2019-10-08 NOTE — TELEPHONE ENCOUNTER
LOV 10/18/19. Pt to be seen. Will call to schedule appt.      Last CMP done 7/2/19    Collected:  7/2/2019 12:48 PM Status:  Final result Dx:  [facility]    Ref Range & Units 7/2/19 12:48 PM   Glucose 70 - 99 mg/dL 116High     Sodium 136

## 2019-10-14 DIAGNOSIS — N18.4 CHRONIC KIDNEY DISEASE, STAGE IV (SEVERE) (HCC): Primary | ICD-10-CM

## 2019-10-16 DIAGNOSIS — E78.1 PURE HYPERGLYCERIDEMIA: ICD-10-CM

## 2019-10-16 RX ORDER — ATORVASTATIN CALCIUM 10 MG/1
TABLET, FILM COATED ORAL
Qty: 90 TABLET | Refills: 0 | Status: SHIPPED | OUTPATIENT
Start: 2019-10-16 | End: 2020-01-10

## 2019-11-10 DIAGNOSIS — I10 ESSENTIAL HYPERTENSION: ICD-10-CM

## 2019-11-10 DIAGNOSIS — I25.10 CORONARY ARTERY DISEASE INVOLVING NATIVE CORONARY ARTERY OF NATIVE HEART WITHOUT ANGINA PECTORIS: ICD-10-CM

## 2019-11-10 DIAGNOSIS — Z86.79 HISTORY OF ATRIAL FIBRILLATION: ICD-10-CM

## 2019-11-11 RX ORDER — METOPROLOL TARTRATE 50 MG/1
TABLET, FILM COATED ORAL
Qty: 180 TABLET | Refills: 0 | Status: SHIPPED | OUTPATIENT
Start: 2019-11-11 | End: 2020-02-11

## 2019-11-11 NOTE — TELEPHONE ENCOUNTER
Last OV 2/8/19  (4/2/19 Jakstys)  7/2/19  /68  Last refilled 8/13/19   #180  0 refills    No future appt with JOEY

## 2019-11-20 ENCOUNTER — OFFICE VISIT (OUTPATIENT)
Dept: CARDIOLOGY | Facility: CLINIC | Age: 69
End: 2019-11-20

## 2019-11-20 VITALS
BODY MASS INDEX: 28.99 KG/M2 | SYSTOLIC BLOOD PRESSURE: 120 MMHG | HEART RATE: 84 BPM | HEIGHT: 72 IN | WEIGHT: 214 LBS | DIASTOLIC BLOOD PRESSURE: 68 MMHG

## 2019-11-20 DIAGNOSIS — I70.239 ATHEROSCLEROSIS OF NATIVE ARTERY OF BOTH LOWER EXTREMITIES WITH BILATERAL ULCERATION, UNSPECIFIED ULCERATION SITE (CMD): ICD-10-CM

## 2019-11-20 DIAGNOSIS — E78.2 HYPERLIPIDEMIA, MIXED: ICD-10-CM

## 2019-11-20 DIAGNOSIS — I65.23 ASYMPTOMATIC CAROTID ARTERY STENOSIS, BILATERAL: ICD-10-CM

## 2019-11-20 DIAGNOSIS — Z95.2 S/P AORTIC VALVE REPLACEMENT WITH PROSTHETIC VALVE: Primary | ICD-10-CM

## 2019-11-20 DIAGNOSIS — I70.249 ATHEROSCLEROSIS OF NATIVE ARTERY OF BOTH LOWER EXTREMITIES WITH BILATERAL ULCERATION, UNSPECIFIED ULCERATION SITE (CMD): ICD-10-CM

## 2019-11-20 DIAGNOSIS — I25.10 CORONARY ARTERY DISEASE INVOLVING NATIVE CORONARY ARTERY OF NATIVE HEART WITHOUT ANGINA PECTORIS: ICD-10-CM

## 2019-11-20 PROCEDURE — 99214 OFFICE O/P EST MOD 30 MIN: CPT | Performed by: INTERNAL MEDICINE

## 2019-11-20 RX ORDER — HYDRALAZINE HYDROCHLORIDE 25 MG/1
25 TABLET, FILM COATED ORAL 2 TIMES DAILY
Qty: 180 TABLET | Refills: 3 | OUTPATIENT
Start: 2019-11-20

## 2019-11-20 RX ORDER — ATORVASTATIN CALCIUM 10 MG/1
10 TABLET, FILM COATED ORAL DAILY
Qty: 90 TABLET | Refills: 3 | OUTPATIENT
Start: 2019-11-20

## 2019-11-22 ENCOUNTER — MED REC SCAN ONLY (OUTPATIENT)
Dept: FAMILY MEDICINE CLINIC | Facility: CLINIC | Age: 69
End: 2019-11-22

## 2019-11-26 LAB
CHOLEST SERPL-MCNC: 116 MG/DL
CHOLEST/HDLC SERPL: 3.6 (CALC)
HDLC SERPL-MCNC: 32 MG/DL
LDLC SERPL CALC-MCNC: ABNORMAL MG/DL (CALC)
NONHDLC SERPL-MCNC: 84 MG/DL (CALC)
TRIGL SERPL-MCNC: 778 MG/DL

## 2019-11-27 ENCOUNTER — TELEPHONE (OUTPATIENT)
Dept: CARDIOLOGY | Age: 69
End: 2019-11-27

## 2019-11-27 DIAGNOSIS — E78.2 HYPERLIPIDEMIA, MIXED: Primary | ICD-10-CM

## 2019-11-29 RX ORDER — ICOSAPENT ETHYL 1000 MG/1
2 CAPSULE ORAL 2 TIMES DAILY
Qty: 120 CAPSULE | Refills: 6 | Status: SHIPPED | OUTPATIENT
Start: 2019-11-29 | End: 2020-07-06

## 2019-12-05 ENCOUNTER — OFFICE VISIT (OUTPATIENT)
Dept: NEPHROLOGY | Facility: CLINIC | Age: 69
End: 2019-12-05
Payer: MEDICARE

## 2019-12-05 VITALS — DIASTOLIC BLOOD PRESSURE: 68 MMHG | SYSTOLIC BLOOD PRESSURE: 142 MMHG | BODY MASS INDEX: 29 KG/M2 | WEIGHT: 216 LBS

## 2019-12-05 DIAGNOSIS — N18.30 CKD (CHRONIC KIDNEY DISEASE) STAGE 3, GFR 30-59 ML/MIN (HCC): Primary | ICD-10-CM

## 2019-12-05 DIAGNOSIS — I10 ESSENTIAL HYPERTENSION: ICD-10-CM

## 2019-12-05 PROCEDURE — 99214 OFFICE O/P EST MOD 30 MIN: CPT | Performed by: INTERNAL MEDICINE

## 2019-12-05 RX ORDER — ICOSAPENT ETHYL 1000 MG/1
1 CAPSULE ORAL 2 TIMES DAILY
COMMUNITY

## 2019-12-05 NOTE — PROGRESS NOTES
Nephrology Progress Note      ASSESSMENT/PLAN:        1) CKD 3- baseline Cr 2-2.5 mg/dL is due to hypertensive nephrosclerosis, diabetic nephropathy, chronic NSAID use and history of right-sided urinary obstruction due to complete ureteral compression by m Cancer ([facility] 2015    colon CA   • CKD (chronic kidney disease) stage 3, GFR 30-59 ml/min (HCC)    • Colon cancer ([facility]     Dx in 2015: tx with colon resection   • Diabetes ([facility]    • Diabetes mellitus ([facility]    • Diabetic peripheral neuropathy ([facility]    • Bailee Santana due to gangrene   • OTHER SURGICAL HISTORY      colon resection   • OTHER SURGICAL HISTORY      On 2/2/2017: TAVR for aortic stenosis   • OTHER SURGICAL HISTORY  11/2018    left second toe amputated   • TOE AMPUTATION Left 11/24/2018    Performed by Siri Gómez physician. 1 kit 0   • Fenofibrate 67 MG Oral Cap Take 1 tablet by mouth daily. • Doxylamine Succinate, Sleep, (UNISOM OR) Take 1 tablet by mouth nightly as needed. • Loperamide HCl (IMODIUM A-D OR) Take by mouth daily as needed.      • Multiple Vit

## 2019-12-07 ENCOUNTER — TELEPHONE (OUTPATIENT)
Dept: FAMILY MEDICINE CLINIC | Facility: CLINIC | Age: 69
End: 2019-12-07

## 2019-12-07 NOTE — TELEPHONE ENCOUNTER
Copy of eye exam received by fax from Dr Az Serna. Exam done 12/4/19  No retinopathy noted. flowsheet updated  Sent to scanning.

## 2019-12-09 DIAGNOSIS — E11.42 TYPE 2 DIABETES MELLITUS WITH DIABETIC POLYNEUROPATHY, WITHOUT LONG-TERM CURRENT USE OF INSULIN (HCC): ICD-10-CM

## 2019-12-09 RX ORDER — GLIPIZIDE 10 MG/1
TABLET, FILM COATED, EXTENDED RELEASE ORAL
Qty: 90 TABLET | Refills: 0 | Status: SHIPPED | OUTPATIENT
Start: 2019-12-09 | End: 2020-03-02

## 2019-12-09 NOTE — TELEPHONE ENCOUNTER
Last refilled on 9/9/19 for # 90 with 0 refills  Last a1c 6.9 on 10/1/19  Last OV 4/2/19-saw TJ for pre-op  Future Appointments   Date Time Provider Alexei Snow   10/6/2020 10:00 AM Maylin Walsh MD [facility] CHRISTOPHER Barber        Thank you.

## 2019-12-16 RX ORDER — PANTOPRAZOLE SODIUM 40 MG/1
TABLET, DELAYED RELEASE ORAL
Qty: 90 TABLET | Refills: 0 | Status: SHIPPED | OUTPATIENT
Start: 2019-12-16 | End: 2020-03-16

## 2020-01-06 ENCOUNTER — TELEPHONE (OUTPATIENT)
Dept: FAMILY MEDICINE CLINIC | Facility: CLINIC | Age: 70
End: 2020-01-06

## 2020-01-06 DIAGNOSIS — E11.42 TYPE 2 DIABETES MELLITUS WITH DIABETIC POLYNEUROPATHY, WITHOUT LONG-TERM CURRENT USE OF INSULIN (HCC): Primary | ICD-10-CM

## 2020-01-06 NOTE — TELEPHONE ENCOUNTER
Letter mailed to patient reminding him he is due for labs.     Lab Frequency Next Occurrence   HEMOGLOBIN A1C

## 2020-01-07 RX ORDER — AMLODIPINE BESYLATE 10 MG/1
TABLET ORAL
Qty: 90 TABLET | Refills: 1 | Status: SHIPPED | OUTPATIENT
Start: 2020-01-07 | End: 2020-07-06

## 2020-01-07 RX ORDER — HYDRALAZINE HYDROCHLORIDE 25 MG/1
TABLET, FILM COATED ORAL
Qty: 180 TABLET | Refills: 1 | Status: SHIPPED | OUTPATIENT
Start: 2020-01-07 | End: 2020-07-06

## 2020-01-10 ENCOUNTER — PATIENT OUTREACH (OUTPATIENT)
Dept: FAMILY MEDICINE CLINIC | Facility: CLINIC | Age: 70
End: 2020-01-10

## 2020-01-10 DIAGNOSIS — E78.1 PURE HYPERGLYCERIDEMIA: ICD-10-CM

## 2020-01-10 RX ORDER — ATORVASTATIN CALCIUM 10 MG/1
TABLET, FILM COATED ORAL
Qty: 90 TABLET | Refills: 0 | Status: SHIPPED | OUTPATIENT
Start: 2020-01-10 | End: 2020-04-11

## 2020-01-10 NOTE — PROGRESS NOTES
Spouse called back and thought she needed to make a lab appt, I said no it was for pt's Medicare Super visit, she said in that case, let me call back and make it at a later time.

## 2020-01-10 NOTE — TELEPHONE ENCOUNTER
Last refill on Atorvastatin # 90 with 0 refills on 10 16 2019   Last OV on 4 2 2019 - Pre-Op exam   Last Lipid panel on 2 16 2019   No future appointments scheduled

## 2020-01-17 LAB
CHOLEST SERPL-MCNC: 105 MG/DL
CHOLEST/HDLC SERPL: NORMAL {RATIO}
HDLC SERPL-MCNC: 34 MG/DL
LDLC SERPL CALC-MCNC: NORMAL MG/DL
LENGTH OF FAST TIME PATIENT: NORMAL H
NONHDLC SERPL-MCNC: NORMAL MG/DL
TRIGL SERPL-MCNC: 621 MG/DL
VLDLC SERPL CALC-MCNC: NORMAL MG/DL

## 2020-01-18 LAB — HEMOGLOBIN A1C: 6.7 % OF TOTAL HGB

## 2020-01-21 ENCOUNTER — MED REC SCAN ONLY (OUTPATIENT)
Dept: FAMILY MEDICINE CLINIC | Facility: CLINIC | Age: 70
End: 2020-01-21

## 2020-01-24 ENCOUNTER — TELEPHONE (OUTPATIENT)
Dept: CARDIOLOGY | Age: 70
End: 2020-01-24

## 2020-01-28 ENCOUNTER — TELEPHONE (OUTPATIENT)
Dept: FAMILY MEDICINE CLINIC | Facility: CLINIC | Age: 70
End: 2020-01-28

## 2020-01-28 DIAGNOSIS — E11.42 TYPE 2 DIABETES MELLITUS WITH DIABETIC POLYNEUROPATHY, WITHOUT LONG-TERM CURRENT USE OF INSULIN (HCC): ICD-10-CM

## 2020-01-28 RX ORDER — PREGABALIN 100 MG/1
CAPSULE ORAL
Qty: 270 CAPSULE | Refills: 1 | Status: SHIPPED | OUTPATIENT
Start: 2020-01-28 | End: 2020-01-28

## 2020-01-28 NOTE — TELEPHONE ENCOUNTER
Patient notified via detailed voicemail left at cell number (ok per  HIPAA consent) that info was sent to [employer] assistance.

## 2020-01-30 ENCOUNTER — TELEPHONE (OUTPATIENT)
Dept: FAMILY MEDICINE CLINIC | Facility: CLINIC | Age: 70
End: 2020-01-30

## 2020-01-30 NOTE — TELEPHONE ENCOUNTER
Fax from [employer] assistance requesting prescription. Script was esribed 1/28/2020. Spoke with Tae Keene who confirmed they received the escribed prescription from 1/28/2020. States she will let the correct department know script is there.  States they

## 2020-01-31 NOTE — TELEPHONE ENCOUNTER
Fax from [employer] stating patient has been approved for assistance until 12/31/2020.  Patient ID number [id number]

## 2020-02-03 ENCOUNTER — CLINICAL ABSTRACT (OUTPATIENT)
Dept: CARDIOLOGY | Age: 70
End: 2020-02-03

## 2020-02-03 DIAGNOSIS — E78.2 HYPERLIPIDEMIA, MIXED: ICD-10-CM

## 2020-02-10 ENCOUNTER — OFFICE VISIT (OUTPATIENT)
Dept: FAMILY MEDICINE CLINIC | Facility: CLINIC | Age: 70
End: 2020-02-10
Payer: MEDICARE

## 2020-02-10 VITALS
RESPIRATION RATE: 16 BRPM | DIASTOLIC BLOOD PRESSURE: 80 MMHG | HEIGHT: 70 IN | SYSTOLIC BLOOD PRESSURE: 142 MMHG | HEART RATE: 68 BPM | BODY MASS INDEX: 31.07 KG/M2 | TEMPERATURE: 98 F | WEIGHT: 217 LBS

## 2020-02-10 DIAGNOSIS — I73.9 PVD (PERIPHERAL VASCULAR DISEASE) (HCC): ICD-10-CM

## 2020-02-10 DIAGNOSIS — Z95.2 S/P TAVR (TRANSCATHETER AORTIC VALVE REPLACEMENT): ICD-10-CM

## 2020-02-10 DIAGNOSIS — Z12.5 SCREENING FOR MALIGNANT NEOPLASM OF PROSTATE: ICD-10-CM

## 2020-02-10 DIAGNOSIS — K64.1 SECOND DEGREE HEMORRHOIDS: ICD-10-CM

## 2020-02-10 DIAGNOSIS — R53.1 GENERALIZED WEAKNESS: ICD-10-CM

## 2020-02-10 DIAGNOSIS — I25.10 CORONARY ARTERY DISEASE INVOLVING NATIVE CORONARY ARTERY OF NATIVE HEART WITHOUT ANGINA PECTORIS: ICD-10-CM

## 2020-02-10 DIAGNOSIS — Z89.412 STATUS POST AMPUTATION OF LEFT GREAT TOE (HCC): ICD-10-CM

## 2020-02-10 DIAGNOSIS — I35.0 NONRHEUMATIC AORTIC VALVE STENOSIS: ICD-10-CM

## 2020-02-10 DIAGNOSIS — K63.5 POLYP OF COLON, UNSPECIFIED PART OF COLON, UNSPECIFIED TYPE: ICD-10-CM

## 2020-02-10 DIAGNOSIS — N18.4 CKD (CHRONIC KIDNEY DISEASE) STAGE 4, GFR 15-29 ML/MIN (HCC): ICD-10-CM

## 2020-02-10 DIAGNOSIS — Z86.79 HISTORY OF ATRIAL FIBRILLATION: ICD-10-CM

## 2020-02-10 DIAGNOSIS — C18.7 CANCER OF SIGMOID COLON (HCC): ICD-10-CM

## 2020-02-10 DIAGNOSIS — E27.8 ADRENAL NODULE (HCC): ICD-10-CM

## 2020-02-10 DIAGNOSIS — Z91.81 AT RISK FOR FALLING: ICD-10-CM

## 2020-02-10 DIAGNOSIS — I77.9 CAROTID ARTERY DISORDER (HCC): ICD-10-CM

## 2020-02-10 DIAGNOSIS — Z85.038 PERSONAL HISTORY OF OTHER MALIGNANT NEOPLASM OF LARGE INTESTINE: ICD-10-CM

## 2020-02-10 DIAGNOSIS — E11.40 CHRONIC PAINFUL DIABETIC NEUROPATHY (HCC): ICD-10-CM

## 2020-02-10 DIAGNOSIS — Z00.00 ENCOUNTER FOR ANNUAL HEALTH EXAMINATION: Primary | ICD-10-CM

## 2020-02-10 DIAGNOSIS — D69.6 THROMBOCYTOPENIA (HCC): Chronic | ICD-10-CM

## 2020-02-10 DIAGNOSIS — E11.42 TYPE 2 DIABETES MELLITUS WITH DIABETIC POLYNEUROPATHY, WITHOUT LONG-TERM CURRENT USE OF INSULIN (HCC): ICD-10-CM

## 2020-02-10 DIAGNOSIS — I70.0 AORTIC CALCIFICATION (HCC): ICD-10-CM

## 2020-02-10 DIAGNOSIS — J84.10 LUNG GRANULOMA (HCC): ICD-10-CM

## 2020-02-10 PROBLEM — E13.621 FOOT ULCER DUE TO SECONDARY DM (HCC): Status: RESOLVED | Noted: 2018-03-06 | Resolved: 2020-02-10

## 2020-02-10 PROBLEM — L97.509 FOOT ULCER DUE TO SECONDARY DM (HCC): Status: RESOLVED | Noted: 2018-03-06 | Resolved: 2020-02-10

## 2020-02-10 PROBLEM — H25.9 AGE-RELATED CATARACT OF LEFT EYE: Status: RESOLVED | Noted: 2018-03-06 | Resolved: 2020-02-10

## 2020-02-10 PROCEDURE — 96160 PT-FOCUSED HLTH RISK ASSMT: CPT | Performed by: FAMILY MEDICINE

## 2020-02-10 PROCEDURE — G0439 PPPS, SUBSEQ VISIT: HCPCS | Performed by: FAMILY MEDICINE

## 2020-02-10 PROCEDURE — 99397 PER PM REEVAL EST PAT 65+ YR: CPT | Performed by: FAMILY MEDICINE

## 2020-02-10 NOTE — PROGRESS NOTES
HPI:   Jodene Nyhan is a 71year old male who presents for a MA (Medicare Advantage) [facility] (Once per calendar year). Needs refills: metoprolol. Cataract: following with Dr. Lianne Gilford.      Patient Active Problem List:     Type 2 diabetes shaun weeks)?: Not at all  PHQ-2 SCORE: 0     Advanced Directive:   He does NOT have a Living Will on file in [facility] [address]    Advance care planning including the explanation and discussion of advance directives standard forms performed Face to Face with patient and Fami nodule ([address]     Lung granuloma ([address]     Aortic calcification (HCC)     Carotid artery disorder (HCC)     Personal history of other malignant neoplasm of large intestine     Polyp of colon     Second degree hemorrhoids    Wt Readings from Last 3 Encounter Take 81 mg by mouth daily.          MEDICAL INFORMATION:   He  has a past medical history of Abdominal mass, right lower quadrant, Acute, but ill-defined, cerebrovascular disease, Anesthesia complication, Aortic stenosis, Arrhythmia, Cancer ([facility] (2015), CK current alcohol use. He reports that he does not use drugs.      REVIEW OF SYSTEMS:   GENERAL: feels well otherwise, no new concerns today   SKIN: denies any unusual skin lesions, sores on feet are healed   EYES: denies blurred vision or double vision  AMADEO normal   Neck: Supple, symmetrical, trachea midline, no adenopathy, thyroid: not enlarged, symmetric, no tenderness/mass/nodules, no carotid bruit or JVD   Back:   Symmetric, no curvature, ROM normal, no CVA tenderness   Lungs:   Clear to auscultation bila PSA with next labs. - PSA SCREEN; Future    4. Aortic calcification (HCC)  S/p TAVR, doing well. Following with Dr. Waqar Coy     5.  Coronary artery disease involving native coronary artery of native heart without angina pectoris  Doing well, following wit pounds without trying?: 2 - No  Has your appetite been poor?: No  How does the patient maintain a good energy level?: Appropriate Exercise  How would you describe your daily physical activity?: Moderate  How would you describe your current health state?: G Once after 65 No vaccine history found Please get once after your 65th birthday    Pneumococcal 23 (Pneumovax)  Covered Once after 65 No vaccine history found Please get once after your 65th birthday    Hepatitis B for Moderate/High Risk No vaccine history No flowsheet data found.

## 2020-02-11 DIAGNOSIS — I10 ESSENTIAL HYPERTENSION: ICD-10-CM

## 2020-02-11 DIAGNOSIS — Z86.79 HISTORY OF ATRIAL FIBRILLATION: ICD-10-CM

## 2020-02-11 DIAGNOSIS — I25.10 CORONARY ARTERY DISEASE INVOLVING NATIVE CORONARY ARTERY OF NATIVE HEART WITHOUT ANGINA PECTORIS: ICD-10-CM

## 2020-02-11 RX ORDER — METOPROLOL TARTRATE 50 MG/1
TABLET, FILM COATED ORAL
Qty: 180 TABLET | Refills: 3 | Status: SHIPPED | OUTPATIENT
Start: 2020-02-11

## 2020-02-11 NOTE — PATIENT INSTRUCTIONS
Mercedez Ariza's SCREENING SCHEDULE   Tests on this list are recommended by your physician but may not be covered, or covered at this frequency, by your insurer. Please check with your insurance carrier before scheduling to verify coverage.     Katherine Nur have smoked more than 100 cigarettes in their lifetime   • Anyone with a family history    Colorectal Cancer Screening Covered up to Age 76     Colonoscopy Screen   Covered every 10 years- more often if abnormal Colonoscopy due on 05/29/2021 Update Health Clients of institutions for the mentally retarded   Persons who live in the same house as a HepB virus carrier   Homosexual men   Illicit injectable drug abusers     Tetanus Toxoid- Only covered with a cut with metal- TD and TDaP Not covered by [facility]

## 2020-02-11 NOTE — TELEPHONE ENCOUNTER
Last OV 2/10/2020, /80  Last labs 12/4/19 BMP/ Creatinine 2.41 Can not refill per protocol)  Last refilled 11/11/19  #180  0 refills

## 2020-02-21 ENCOUNTER — NURSE ONLY (OUTPATIENT)
Dept: FAMILY MEDICINE CLINIC | Facility: CLINIC | Age: 70
End: 2020-02-21
Payer: MEDICARE

## 2020-02-21 ENCOUNTER — OFFICE VISIT (OUTPATIENT)
Dept: PHYSICAL THERAPY | Age: 70
End: 2020-02-21
Attending: FAMILY MEDICINE
Payer: MEDICARE

## 2020-02-21 DIAGNOSIS — R53.1 GENERALIZED WEAKNESS: ICD-10-CM

## 2020-02-21 PROCEDURE — 97163 PT EVAL HIGH COMPLEX 45 MIN: CPT

## 2020-02-21 NOTE — PROGRESS NOTES
Patient to clinic for ear flush, right ear. Large amount of cerumen flushed from right ear. Dr Li Quest in to evaluate. Per KE, no infection seen.   Patient left office in stable condition

## 2020-02-21 NOTE — PROGRESS NOTES
INITIAL EVALUATION:   Referring Physician: Dr. Todd Cheng  Diagnosis: weakness; abnormality of gait     Date of Service: 2/21/2020     PATIENT SUMMARY    Carolina Dixon is a 71year old male.   He indictes he is retired, resides with spouse Giselle March)  He re Hypertension, essential, benign    • Muscle weakness    • Neuropathy     bilat hands,legs,feet   • Peripheral vascular disease (HCC)    • Renal disorder     ckd   • S/P TAVR (transcatheter aortic valve replacement)     On 2/2/2017: TAVR for aortic stenosis having any stamina; he can really feel that in terms of walking; he states he would be hard pressed to walk 3 blocks; he reports he does not feel he gets short of breath or anything just that he gets weak in the legs; he feels like he drags left foot; feel • Neuropathy     bilat hands,legs,feet   • Peripheral vascular disease (HCC)    • Renal disorder     ckd   • S/P TAVR (transcatheter aortic valve replacement)     On 2/2/2017: TAVR for aortic stenosis   • Stented coronary artery    • Stroke ([facility]     [address] walking that is two short for him; he demonstrates terminal stance with push off lateral of foot; he has hard time with left lower extremity foot clearance during swing; gait deviations are increased without assistive device.  -He has amputation of limited walking and functional mobility secondary to impairments in muscle performance.   Peg Aragon would benefit from services of the physical therapist to address the above impairments to enhance function  -Likely to respond to progressive resistance exerc

## 2020-03-02 DIAGNOSIS — E11.42 TYPE 2 DIABETES MELLITUS WITH DIABETIC POLYNEUROPATHY, WITHOUT LONG-TERM CURRENT USE OF INSULIN (HCC): ICD-10-CM

## 2020-03-02 RX ORDER — GLIPIZIDE 10 MG/1
TABLET, FILM COATED, EXTENDED RELEASE ORAL
Qty: 90 TABLET | Refills: 0 | Status: SHIPPED | OUTPATIENT
Start: 2020-03-02 | End: 2020-06-01

## 2020-03-02 NOTE — TELEPHONE ENCOUNTER
Microalbumin procedure in past 12 months or taking ACE/ARB    HgBA1C procedure resulted in past 6 months    Last HgBA1C < 7.5    Appointment in past 6 or next 3 months     Last refill - 12/9/19 - #90   Last A1c - 1/17/20 - 6.7  Last office visit - 2/10/20

## 2020-03-04 ENCOUNTER — OFFICE VISIT (OUTPATIENT)
Dept: PHYSICAL THERAPY | Age: 70
End: 2020-03-04
Attending: FAMILY MEDICINE
Payer: MEDICARE

## 2020-03-04 DIAGNOSIS — R53.1 GENERALIZED WEAKNESS: ICD-10-CM

## 2020-03-04 PROCEDURE — 97110 THERAPEUTIC EXERCISES: CPT

## 2020-03-04 PROCEDURE — 97530 THERAPEUTIC ACTIVITIES: CPT

## 2020-03-04 PROCEDURE — 97112 NEUROMUSCULAR REEDUCATION: CPT

## 2020-03-04 NOTE — PROGRESS NOTES
Dx:     Weakness; abnormality of gait          Authorized # of Visits:  8 visits        Next MD visit: none scheduled  Fall Risk: standard           Precautions: n/a             Subjective:  No new issues or complaints.     Objective:  (See assessment)

## 2020-03-06 ENCOUNTER — OFFICE VISIT (OUTPATIENT)
Dept: PHYSICAL THERAPY | Age: 70
End: 2020-03-06
Attending: FAMILY MEDICINE
Payer: MEDICARE

## 2020-03-06 DIAGNOSIS — R53.1 GENERALIZED WEAKNESS: ICD-10-CM

## 2020-03-06 PROCEDURE — 97530 THERAPEUTIC ACTIVITIES: CPT

## 2020-03-06 PROCEDURE — 97116 GAIT TRAINING THERAPY: CPT

## 2020-03-06 PROCEDURE — 97112 NEUROMUSCULAR REEDUCATION: CPT

## 2020-03-06 NOTE — PROGRESS NOTES
Dx:     Weakness; abnormality of gait          Authorized # of Visits:  8 visits        Next MD visit: none scheduled  Fall Risk: standard           Precautions: n/a             Subjective:   Made adjustment to walker; wanted to know if walker height is now emphasis on postural control in standing -Standing bilateral mid-row and B shoulder extension with emphasis on postural control in standing   -Discussion regarding walker height and rationale; unable to adjust secondary to layers of tape on the walker limi

## 2020-03-09 ENCOUNTER — OFFICE VISIT (OUTPATIENT)
Dept: PHYSICAL THERAPY | Age: 70
End: 2020-03-09
Attending: FAMILY MEDICINE
Payer: MEDICARE

## 2020-03-09 DIAGNOSIS — R53.1 GENERALIZED WEAKNESS: ICD-10-CM

## 2020-03-09 PROCEDURE — 97110 THERAPEUTIC EXERCISES: CPT

## 2020-03-09 PROCEDURE — 97530 THERAPEUTIC ACTIVITIES: CPT

## 2020-03-09 NOTE — PROGRESS NOTES
Dx:     Weakness; abnormality of gait          Authorized # of Visits:  8 visits        Next MD visit: none scheduled  Fall Risk: standard           Precautions: n/a             Subjective:  Patient reports he felt ok after last visit; no new issues to rep single point cane including 6 inch step-up such as with negotiating curb; manual contacts, supervision, standby assist required for saftey -Supine bridge:  12 reps x 3 sets manual contacts @ knees with facilitatory traction to LE   -Supine lower trunk rota

## 2020-03-11 ENCOUNTER — OFFICE VISIT (OUTPATIENT)
Dept: PHYSICAL THERAPY | Age: 70
End: 2020-03-11
Attending: FAMILY MEDICINE
Payer: MEDICARE

## 2020-03-11 DIAGNOSIS — R53.1 GENERALIZED WEAKNESS: ICD-10-CM

## 2020-03-11 PROCEDURE — 97530 THERAPEUTIC ACTIVITIES: CPT

## 2020-03-11 PROCEDURE — 97110 THERAPEUTIC EXERCISES: CPT

## 2020-03-11 NOTE — PROGRESS NOTES
Dx:     Weakness; abnormality of gait          Authorized # of Visits:  8 visits        Next MD visit: none scheduled  Fall Risk: standard           Precautions: n/a             Subjective:   Patient reports he has been experiencing right foot pain; he rep 5     -Sit/stand from elevated table with emphasis on hip hinge full hip extension when coming to terminal standin reps x 3 sets -Sit/stand from elevated table with emphasis on hip hinge full hip extension when coming to terminal standin reps x 3 level 2.0 x 10 minutes -Nu-step level 3.0 x 10 minutes -Nu-step level 3.0 x 12 minutes                             Charges:    Therapeutic excercise x 2  Therapeutic activity x 1   Total Timed Treatment: 40 min    Total Treatment Time: 40 min

## 2020-03-16 ENCOUNTER — APPOINTMENT (OUTPATIENT)
Dept: PHYSICAL THERAPY | Age: 70
End: 2020-03-16
Attending: FAMILY MEDICINE
Payer: MEDICARE

## 2020-03-16 RX ORDER — PANTOPRAZOLE SODIUM 40 MG/1
TABLET, DELAYED RELEASE ORAL
Qty: 90 TABLET | Refills: 0 | Status: SHIPPED | OUTPATIENT
Start: 2020-03-16 | End: 2020-06-15

## 2020-03-16 NOTE — TELEPHONE ENCOUNTER
Routing to provider per protocol. Last refilled on 12/16/19 for # 90 with 0 rf. Last seen on 2/10/20.      Future Appointments   Date Time Provider Alexei Snow   3/18/2020 10:15 AM CHEIKH Morgan   3/23/2020 10:15 AM Tra

## 2020-03-18 ENCOUNTER — APPOINTMENT (OUTPATIENT)
Dept: PHYSICAL THERAPY | Age: 70
End: 2020-03-18
Attending: FAMILY MEDICINE
Payer: MEDICARE

## 2020-03-23 ENCOUNTER — APPOINTMENT (OUTPATIENT)
Dept: PHYSICAL THERAPY | Age: 70
End: 2020-03-23
Attending: FAMILY MEDICINE
Payer: MEDICARE

## 2020-03-25 ENCOUNTER — APPOINTMENT (OUTPATIENT)
Dept: PHYSICAL THERAPY | Age: 70
End: 2020-03-25
Attending: FAMILY MEDICINE
Payer: MEDICARE

## 2020-03-30 ENCOUNTER — APPOINTMENT (OUTPATIENT)
Dept: PHYSICAL THERAPY | Age: 70
End: 2020-03-30
Attending: FAMILY MEDICINE
Payer: MEDICARE

## 2020-04-01 ENCOUNTER — APPOINTMENT (OUTPATIENT)
Dept: PHYSICAL THERAPY | Age: 70
End: 2020-04-01
Attending: FAMILY MEDICINE
Payer: MEDICARE

## 2020-04-10 DIAGNOSIS — E78.1 PURE HYPERGLYCERIDEMIA: ICD-10-CM

## 2020-04-11 RX ORDER — ATORVASTATIN CALCIUM 10 MG/1
TABLET, FILM COATED ORAL
Qty: 90 TABLET | Refills: 0 | Status: SHIPPED | OUTPATIENT
Start: 2020-04-11 | End: 2020-07-13

## 2020-04-11 NOTE — TELEPHONE ENCOUNTER
Cholesterol Medication Protocol Failed4/10 3:44 PM   Lipid panel within past 12 months    ALT < 80    ALT resulted within past year    Appointment within past 12 or next 3 months     Last refill - 1/10/20 - #90   Last lipid panel - 1/17/20   Last ALT - 7/2

## 2020-04-28 ENCOUNTER — TELEPHONE (OUTPATIENT)
Dept: CARDIOLOGY | Age: 70
End: 2020-04-28

## 2020-04-28 RX ORDER — PANTOPRAZOLE SODIUM 40 MG/1
TABLET, DELAYED RELEASE ORAL
COMMUNITY
Start: 2019-12-16

## 2020-04-29 ENCOUNTER — OFFICE VISIT (OUTPATIENT)
Dept: CARDIOLOGY | Age: 70
End: 2020-04-29

## 2020-04-29 VITALS — BODY MASS INDEX: 29.12 KG/M2 | HEIGHT: 72 IN | WEIGHT: 215 LBS

## 2020-04-29 DIAGNOSIS — I70.239 ATHEROSCLEROSIS OF NATIVE ARTERY OF BOTH LOWER EXTREMITIES WITH BILATERAL ULCERATION, UNSPECIFIED ULCERATION SITE (CMD): ICD-10-CM

## 2020-04-29 DIAGNOSIS — I70.249 ATHEROSCLEROSIS OF NATIVE ARTERY OF BOTH LOWER EXTREMITIES WITH BILATERAL ULCERATION, UNSPECIFIED ULCERATION SITE (CMD): ICD-10-CM

## 2020-04-29 DIAGNOSIS — Z95.2 S/P AORTIC VALVE REPLACEMENT WITH PROSTHETIC VALVE: ICD-10-CM

## 2020-04-29 DIAGNOSIS — E78.2 HYPERLIPIDEMIA, MIXED: ICD-10-CM

## 2020-04-29 DIAGNOSIS — I65.23 ASYMPTOMATIC CAROTID ARTERY STENOSIS, BILATERAL: ICD-10-CM

## 2020-04-29 DIAGNOSIS — I35.0 AORTIC VALVE STENOSIS, ETIOLOGY OF CARDIAC VALVE DISEASE UNSPECIFIED: ICD-10-CM

## 2020-04-29 DIAGNOSIS — I63.9 CEREBROVASCULAR ACCIDENT (CVA), UNSPECIFIED MECHANISM (CMD): Primary | ICD-10-CM

## 2020-04-29 DIAGNOSIS — I25.10 CORONARY ARTERY DISEASE INVOLVING NATIVE CORONARY ARTERY OF NATIVE HEART WITHOUT ANGINA PECTORIS: ICD-10-CM

## 2020-04-29 PROCEDURE — 99442 TELEPHONE E&M BY PHYSICIAN EST PT NOT ORIG PREV 7 DAYS 11-20 MIN: CPT | Performed by: INTERNAL MEDICINE

## 2020-04-29 RX ORDER — METOPROLOL TARTRATE 50 MG/1
TABLET, FILM COATED ORAL
COMMUNITY
Start: 2020-02-11

## 2020-04-29 SDOH — HEALTH STABILITY: PHYSICAL HEALTH: ON AVERAGE, HOW MANY MINUTES DO YOU ENGAGE IN EXERCISE AT THIS LEVEL?: 20 MIN

## 2020-04-29 SDOH — SOCIAL STABILITY: SOCIAL NETWORK: ARE YOU MARRIED, WIDOWED, DIVORCED, SEPARATED, NEVER MARRIED, OR LIVING WITH A PARTNER?: MARRIED

## 2020-04-29 SDOH — HEALTH STABILITY: PHYSICAL HEALTH: ON AVERAGE, HOW MANY DAYS PER WEEK DO YOU ENGAGE IN MODERATE TO STRENUOUS EXERCISE (LIKE A BRISK WALK)?: 2 DAYS

## 2020-04-29 ASSESSMENT — PATIENT HEALTH QUESTIONNAIRE - PHQ9
1. LITTLE INTEREST OR PLEASURE IN DOING THINGS: NOT AT ALL
2. FEELING DOWN, DEPRESSED OR HOPELESS: NOT AT ALL
SUM OF ALL RESPONSES TO PHQ9 QUESTIONS 1 AND 2: 0
SUM OF ALL RESPONSES TO PHQ9 QUESTIONS 1 AND 2: 0

## 2020-04-30 ENCOUNTER — MED REC SCAN ONLY (OUTPATIENT)
Dept: FAMILY MEDICINE CLINIC | Facility: CLINIC | Age: 70
End: 2020-04-30

## 2020-05-14 ENCOUNTER — TELEPHONE (OUTPATIENT)
Dept: HEMATOLOGY/ONCOLOGY | Facility: HOSPITAL | Age: 70
End: 2020-05-14

## 2020-05-14 NOTE — TELEPHONE ENCOUNTER
LM for patient. Changing appt to [place]. Pt to call with any questions or conflicts.  Pt MyChart active

## 2020-05-31 DIAGNOSIS — E11.42 TYPE 2 DIABETES MELLITUS WITH DIABETIC POLYNEUROPATHY, WITHOUT LONG-TERM CURRENT USE OF INSULIN (HCC): ICD-10-CM

## 2020-06-01 RX ORDER — GLIPIZIDE 10 MG/1
TABLET, FILM COATED, EXTENDED RELEASE ORAL
Qty: 90 TABLET | Refills: 0 | Status: SHIPPED | OUTPATIENT
Start: 2020-06-01 | End: 2020-08-31

## 2020-06-02 ENCOUNTER — APPOINTMENT (OUTPATIENT)
Dept: HEMATOLOGY/ONCOLOGY | Age: 70
End: 2020-06-02
Attending: INTERNAL MEDICINE
Payer: MEDICARE

## 2020-06-15 RX ORDER — PANTOPRAZOLE SODIUM 40 MG/1
TABLET, DELAYED RELEASE ORAL
Qty: 90 TABLET | Refills: 2 | Status: SHIPPED | OUTPATIENT
Start: 2020-06-15

## 2020-06-29 DIAGNOSIS — E11.42 TYPE 2 DIABETES MELLITUS WITH DIABETIC POLYNEUROPATHY, WITHOUT LONG-TERM CURRENT USE OF INSULIN (HCC): ICD-10-CM

## 2020-06-29 RX ORDER — PREGABALIN 100 MG/1
CAPSULE ORAL
Qty: 270 CAPSULE | Refills: 0 | Status: SHIPPED | OUTPATIENT
Start: 2020-06-29 | End: 2020-09-28

## 2020-06-30 NOTE — TELEPHONE ENCOUNTER
Nothing further needed from our office  See 1/30/2020 tel enc.  Patient assistance approved until 12/31/2020

## 2020-07-05 DIAGNOSIS — E78.2 HYPERLIPIDEMIA, MIXED: Primary | ICD-10-CM

## 2020-07-06 RX ORDER — AMLODIPINE BESYLATE 10 MG/1
TABLET ORAL
Qty: 90 TABLET | Refills: 1 | Status: SHIPPED | OUTPATIENT
Start: 2020-07-06 | End: 2021-01-04

## 2020-07-06 RX ORDER — HYDRALAZINE HYDROCHLORIDE 25 MG/1
TABLET, FILM COATED ORAL
Qty: 180 TABLET | Refills: 1 | Status: SHIPPED | OUTPATIENT
Start: 2020-07-06 | End: 2021-01-04

## 2020-07-06 RX ORDER — ICOSAPENT ETHYL 1000 MG/1
CAPSULE ORAL
Qty: 360 CAPSULE | Refills: 3 | Status: SHIPPED | OUTPATIENT
Start: 2020-07-06

## 2020-07-12 DIAGNOSIS — E78.1 PURE HYPERGLYCERIDEMIA: ICD-10-CM

## 2020-07-13 RX ORDER — ATORVASTATIN CALCIUM 10 MG/1
TABLET, FILM COATED ORAL
Qty: 90 TABLET | Refills: 1 | Status: SHIPPED | OUTPATIENT
Start: 2020-07-13 | End: 2020-10-06

## 2020-07-13 NOTE — TELEPHONE ENCOUNTER
Pt failed refill protocol for the following reasons:  Cholesterol Medication Protocol Failed7/12 1:49 PM   ALT < 80    ALT resulted within past year    Lipid panel within past 12 months           Last refill: 4- #90 with 0 refills  Last appt: 2-10-2

## 2020-08-12 ENCOUNTER — APPOINTMENT (OUTPATIENT)
Dept: HEMATOLOGY/ONCOLOGY | Facility: HOSPITAL | Age: 70
End: 2020-08-12
Attending: INTERNAL MEDICINE
Payer: MEDICARE

## 2020-08-28 ENCOUNTER — TELEPHONE (OUTPATIENT)
Dept: HEMATOLOGY/ONCOLOGY | Facility: HOSPITAL | Age: 70
End: 2020-08-28

## 2020-08-28 DIAGNOSIS — D69.6 THROMBOCYTOPENIA (HCC): ICD-10-CM

## 2020-08-28 DIAGNOSIS — C18.7 CANCER OF SIGMOID COLON (HCC): Primary | ICD-10-CM

## 2020-08-28 LAB
ALBUMIN SERPL-MCNC: 4.4 G/DL (ref 3.6–5.1)
ALBUMIN/GLOB SERPL: 1.6 (CALC) (ref 1–2.5)
ALP SERPL-CCNC: 89 U/L (ref 35–144)
ALT SERPL-CCNC: 27 U/L (ref 9–46)
AST SERPL-CCNC: 26 U/L (ref 10–35)
BASOPHILS # BLD AUTO: 48 CELLS/UL (ref 0–200)
BASOPHILS NFR BLD AUTO: 0.7 %
BILIRUB SERPL-MCNC: 0.8 MG/DL (ref 0.2–1.2)
BUN SERPL-MCNC: 18 MG/DL (ref 7–25)
BUN/CREAT SERPL: 10 (CALC) (ref 6–22)
CALCIUM SERPL-MCNC: 9.8 MG/DL (ref 8.6–10.3)
CHLORIDE SERPL-SCNC: 104 MMOL/L (ref 98–110)
CHOLEST SERPL-MCNC: 98 MG/DL
CHOLEST/HDLC SERPL: 3.2 (CALC)
CK SERPL-CCNC: 30 U/L (ref 44–196)
CO2 SERPL-SCNC: 24 MMOL/L (ref 20–32)
CREAT SERPL-MCNC: 1.72 MG/DL (ref 0.7–1.18)
EOSINOPHIL # BLD AUTO: 242 CELLS/UL (ref 15–500)
EOSINOPHIL NFR BLD AUTO: 3.5 %
ERYTHROCYTE [DISTWIDTH] IN BLOOD BY AUTOMATED COUNT: 13.8 % (ref 11–15)
GFRSERPLBLD MDRD-ARVRAT: 39 ML/MIN/1.73M2
GLOBULIN SER CALC-MCNC: 2.7 G/DL (CALC) (ref 1.9–3.7)
GLUCOSE SERPL-MCNC: 212 MG/DL (ref 65–99)
HBA1C MFR BLD: 7.7 % OF TOTAL HGB
HCT VFR BLD AUTO: 43.8 % (ref 38.5–50)
HDLC SERPL-MCNC: 31 MG/DL
HGB BLD-MCNC: 14.9 G/DL (ref 13.2–17.1)
LDLC SERPL CALC-MCNC: ABNORMAL MG/DL (CALC)
LDLC SERPL DIRECT ASSAY-MCNC: 20 MG/DL
LYMPHOCYTES # BLD AUTO: 1449 CELLS/UL (ref 850–3900)
LYMPHOCYTES NFR BLD AUTO: 21 %
MCH RBC QN AUTO: 28.5 PG (ref 27–33)
MCHC RBC AUTO-ENTMCNC: 34 G/DL (ref 32–36)
MCV RBC AUTO: 83.7 FL (ref 80–100)
MONOCYTES # BLD AUTO: 380 CELLS/UL (ref 200–950)
MONOCYTES NFR BLD AUTO: 5.5 %
NEUTROPHILS # BLD AUTO: 4782 CELLS/UL (ref 1500–7800)
NEUTROPHILS NFR BLD AUTO: 69.3 %
NONHDLC SERPL-MCNC: 67 MG/DL (CALC)
PLATELET # BLD AUTO: 115 THOUSAND/UL (ref 140–400)
PMV BLD REES-ECKER: 11.9 FL (ref 7.5–12.5)
POTASSIUM SERPL-SCNC: 4.7 MMOL/L (ref 3.5–5.3)
PROT SERPL-MCNC: 7.1 G/DL (ref 6.1–8.1)
PSA, TOTAL: 0.9 NG/ML
RBC # BLD AUTO: 5.23 MILLION/UL (ref 4.2–5.8)
SODIUM SERPL-SCNC: 138 MMOL/L (ref 135–146)
TRIGL SERPL-MCNC: 508 MG/DL
WBC # BLD AUTO: 6.9 THOUSAND/UL (ref 3.8–10.8)

## 2020-08-28 NOTE — TELEPHONE ENCOUNTER
Spoke with CT department. Order changed to without contrast per APN order. They verbalized understanding.

## 2020-08-28 NOTE — IMAGING NOTE
08/28 @ 1323 Elizabeth from Dr Allie Ashton office took message regarding GFR= 26 from Dec 2019. Pt is scheduled 09/04 at 0915 for CT with IV contrast. MATAN.

## 2020-08-28 NOTE — TELEPHONE ENCOUNTER
Fiona called saying the patient is scheduled for a CT with Contrast. She says his GFR is 26 12/19. She wants to know if there are further orders, or anything more current.  Please call

## 2020-08-28 NOTE — IMAGING NOTE
08/28 @ [phone] Per Joleen Moses from Dr Nelia Aguilar office, pt can proceed with CT scan. Will need GFR on appt date. Last GFR= 26 from Dec 2019. Please call Dr Nelia Aguilar or check Frances's notes in Epic  if GFR result is  less than 26. No hydration orders at this time.

## 2020-08-31 DIAGNOSIS — E11.42 TYPE 2 DIABETES MELLITUS WITH DIABETIC POLYNEUROPATHY, WITHOUT LONG-TERM CURRENT USE OF INSULIN (HCC): ICD-10-CM

## 2020-08-31 RX ORDER — GLIPIZIDE 10 MG/1
10 TABLET, FILM COATED, EXTENDED RELEASE ORAL DAILY
Qty: 90 TABLET | Refills: 0 | Status: SHIPPED | OUTPATIENT
Start: 2020-08-31 | End: 2020-11-30

## 2020-08-31 NOTE — TELEPHONE ENCOUNTER
Fax from [facility] requesting refll glipizide    Last OV 2/10/2020  Last labs 1/17/2020  A1c 6.7, no microalbumin  Last refilled 6/1/2020      Diabetic Medication Protocol Failed8/31 4:41 PM   HgBA1C procedure resulted in past 6 months    Last HgBA1C < 7.5

## 2020-09-04 ENCOUNTER — HOSPITAL ENCOUNTER (OUTPATIENT)
Dept: CT IMAGING | Age: 70
Discharge: HOME | End: 2020-09-04
Attending: INTERNAL MEDICINE
Payer: MEDICARE

## 2020-09-04 DIAGNOSIS — C18.7 CANCER OF SIGMOID COLON (HCC): ICD-10-CM

## 2020-09-04 DIAGNOSIS — D69.6 THROMBOCYTOPENIA (HCC): ICD-10-CM

## 2020-09-04 PROCEDURE — 74176 CT ABD & PELVIS W/O CONTRAST: CPT | Performed by: INTERNAL MEDICINE

## 2020-09-04 PROCEDURE — 71250 CT THORAX DX C-: CPT | Performed by: INTERNAL MEDICINE

## 2020-09-09 ENCOUNTER — OFFICE VISIT (OUTPATIENT)
Dept: HEMATOLOGY/ONCOLOGY | Facility: HOSPITAL | Age: 70
End: 2020-09-09
Attending: INTERNAL MEDICINE
Payer: MEDICARE

## 2020-09-09 VITALS
BODY MASS INDEX: 30 KG/M2 | OXYGEN SATURATION: 99 % | DIASTOLIC BLOOD PRESSURE: 79 MMHG | TEMPERATURE: 97 F | HEART RATE: 64 BPM | RESPIRATION RATE: 18 BRPM | SYSTOLIC BLOOD PRESSURE: 169 MMHG | WEIGHT: 210.81 LBS

## 2020-09-09 DIAGNOSIS — C18.7 CANCER OF SIGMOID COLON (HCC): ICD-10-CM

## 2020-09-09 DIAGNOSIS — D69.6 THROMBOCYTOPENIA (HCC): ICD-10-CM

## 2020-09-09 DIAGNOSIS — R91.1 LUNG NODULE: Primary | ICD-10-CM

## 2020-09-09 LAB
ALBUMIN SERPL-MCNC: 3.6 G/DL (ref 3.4–5)
ALBUMIN/GLOB SERPL: 0.9 {RATIO} (ref 1–2)
ALP LIVER SERPL-CCNC: 99 U/L (ref 45–117)
ALT SERPL-CCNC: 40 U/L (ref 16–61)
ANION GAP SERPL CALC-SCNC: 4 MMOL/L (ref 0–18)
AST SERPL-CCNC: 28 U/L (ref 15–37)
BASOPHILS # BLD AUTO: 0.04 X10(3) UL (ref 0–0.2)
BASOPHILS NFR BLD AUTO: 0.5 %
BILIRUB SERPL-MCNC: 0.8 MG/DL (ref 0.1–2)
BUN BLD-MCNC: 19 MG/DL (ref 7–18)
BUN/CREAT SERPL: 9.7 (ref 10–20)
CALCIUM BLD-MCNC: 9.3 MG/DL (ref 8.5–10.1)
CEA SERPL-MCNC: 1.4 NG/ML (ref ?–5)
CHLORIDE SERPL-SCNC: 107 MMOL/L (ref 98–112)
CO2 SERPL-SCNC: 25 MMOL/L (ref 21–32)
CREAT BLD-MCNC: 1.96 MG/DL (ref 0.7–1.3)
DEPRECATED RDW RBC AUTO: 40.4 FL (ref 35.1–46.3)
EOSINOPHIL # BLD AUTO: 0.22 X10(3) UL (ref 0–0.7)
EOSINOPHIL NFR BLD AUTO: 2.5 %
ERYTHROCYTE [DISTWIDTH] IN BLOOD BY AUTOMATED COUNT: 13.5 % (ref 11–15)
GLOBULIN PLAS-MCNC: 4.1 G/DL (ref 2.8–4.4)
GLUCOSE BLD-MCNC: 253 MG/DL (ref 70–99)
HCT VFR BLD AUTO: 43.9 % (ref 39–53)
HGB BLD-MCNC: 14.8 G/DL (ref 13–17.5)
IMM GRANULOCYTES # BLD AUTO: 0.02 X10(3) UL (ref 0–1)
IMM GRANULOCYTES NFR BLD: 0.2 %
LYMPHOCYTES # BLD AUTO: 1.68 X10(3) UL (ref 1–4)
LYMPHOCYTES NFR BLD AUTO: 18.9 %
M PROTEIN MFR SERPL ELPH: 7.7 G/DL (ref 6.4–8.2)
MCH RBC QN AUTO: 27.9 PG (ref 26–34)
MCHC RBC AUTO-ENTMCNC: 33.7 G/DL (ref 31–37)
MCV RBC AUTO: 82.7 FL (ref 80–100)
MONOCYTES # BLD AUTO: 0.55 X10(3) UL (ref 0.1–1)
MONOCYTES NFR BLD AUTO: 6.2 %
NEUTROPHILS # BLD AUTO: 6.36 X10 (3) UL (ref 1.5–7.7)
NEUTROPHILS # BLD AUTO: 6.36 X10(3) UL (ref 1.5–7.7)
NEUTROPHILS NFR BLD AUTO: 71.7 %
OSMOLALITY SERPL CALC.SUM OF ELEC: 293 MOSM/KG (ref 275–295)
PATIENT FASTING Y/N/NP: NO
PLATELET # BLD AUTO: 134 10(3)UL (ref 150–450)
POTASSIUM SERPL-SCNC: 4.7 MMOL/L (ref 3.5–5.1)
RBC # BLD AUTO: 5.31 X10(6)UL (ref 3.8–5.8)
SODIUM SERPL-SCNC: 136 MMOL/L (ref 136–145)
WBC # BLD AUTO: 8.9 X10(3) UL (ref 4–11)

## 2020-09-09 PROCEDURE — 99214 OFFICE O/P EST MOD 30 MIN: CPT | Performed by: INTERNAL MEDICINE

## 2020-09-09 NOTE — PROGRESS NOTES
[facility] Progress Note    Patient Name: Brayden Person   YOB: 1950   Medical Record Number: [id number]   CSN: [id number]   Attending Physician: Candace Carroll M.D.    Referring Physician: Stephanie Yip DO      Date of Visit: 9/9/2020 5/2019 which showed tubular adenomas. Recommended repeat colonoscopy in 2 years      2. AS s/p TAVR 2/2017    3. DM    4. CKD    5. HTN    6. Chronic thrombocytopenia (since 1/2017)- could be sequestration from liver disease  (seen on imaging)    7.  Howardme Sierainaas 81 MG Oral Tab, Take 81 mg by mouth daily.   , Disp: , Rfl:     Past Medical History:  Past Medical History:   Diagnosis Date   • Abdominal mass, right lower quadrant     kidney/abd area- colonoscopy scheduled 04/01/16   • Acute, but ill-defined, cerebrovas • CHOLECYSTECTOMY     • COLECTOMY     • COLONOSCOPY N/A 9/1/2017    Performed by Shakila Schmidt MD at [facility] ENDOSCOPY   • COLONOSCOPY N/A 4/3/2016    Performed by Shakila Schmidt MD at [facility] ENDOSCOPY   • COLONOSCOPY N/A 4/1/2016    Performed by Christopher [email or web address]      Smokeless tobacco: Never Used    Substance and Sexual Activity      Alcohol use: Yes        Comment: social      Drug use: No      Sexual activity: Not on file    Lifestyle      Physical activity:        Days per week: Not on file healed. Extremities: Pedal pulses are present. Mild BLE edema  Neurological: Grossly intact.        Laboratory:  Recent Results (from the past 24 hour(s))   COMP METABOLIC PANEL (14)    Collection Time: 09/09/20  9:00 AM   Result Value Ref Range    Glucos COMPARISON:  [place], CT, CT CHEST+ABDOMEN+PELVIS(CPT=71250/91577), 6/20/2019, 9:26 AM.     INDICATIONS:  D69.6 Thrombocytopenia, unspecified C18.7 Malignant neoplasm of sigmoid colon     TECHNIQUE:  Following oral contrast administration, unenhanced 9/04/2020 at 9:59 AM       Finalized by (CST): Yvonne Villasenor MD on 9/04/2020 at 10:25 AM         Impression and Plan:  1. Colon cancer- Clinically MARSHA and doing well from a malignancy standpoint.  Reviewed most recent CT with him and his wife which show

## 2020-09-21 ENCOUNTER — TELEPHONE (OUTPATIENT)
Dept: CARDIOLOGY | Age: 70
End: 2020-09-21

## 2020-09-23 ENCOUNTER — OFFICE VISIT (OUTPATIENT)
Dept: CARDIOLOGY | Facility: CLINIC | Age: 70
End: 2020-09-23

## 2020-09-23 VITALS — HEIGHT: 72 IN | BODY MASS INDEX: 29.16 KG/M2

## 2020-09-23 DIAGNOSIS — I70.239 ATHEROSCLEROSIS OF NATIVE ARTERY OF BOTH LOWER EXTREMITIES WITH BILATERAL ULCERATION, UNSPECIFIED ULCERATION SITE (CMD): ICD-10-CM

## 2020-09-23 DIAGNOSIS — I35.0 NONRHEUMATIC AORTIC VALVE STENOSIS: ICD-10-CM

## 2020-09-23 DIAGNOSIS — Z98.890 HISTORY OF CAROTID ENDARTERECTOMY: ICD-10-CM

## 2020-09-23 DIAGNOSIS — I25.10 CORONARY ARTERY DISEASE INVOLVING NATIVE CORONARY ARTERY OF NATIVE HEART WITHOUT ANGINA PECTORIS: ICD-10-CM

## 2020-09-23 DIAGNOSIS — I70.249 ATHEROSCLEROSIS OF NATIVE ARTERY OF BOTH LOWER EXTREMITIES WITH BILATERAL ULCERATION, UNSPECIFIED ULCERATION SITE (CMD): ICD-10-CM

## 2020-09-23 DIAGNOSIS — E78.2 HYPERLIPIDEMIA, MIXED: ICD-10-CM

## 2020-09-23 DIAGNOSIS — I63.9 CEREBROVASCULAR ACCIDENT (CVA), UNSPECIFIED MECHANISM (CMD): Primary | ICD-10-CM

## 2020-09-23 DIAGNOSIS — I65.23 ASYMPTOMATIC CAROTID ARTERY STENOSIS, BILATERAL: ICD-10-CM

## 2020-09-23 DIAGNOSIS — Z95.2 S/P AORTIC VALVE REPLACEMENT WITH PROSTHETIC VALVE: ICD-10-CM

## 2020-09-23 PROCEDURE — 99215 OFFICE O/P EST HI 40 MIN: CPT | Performed by: INTERNAL MEDICINE

## 2020-09-23 SDOH — HEALTH STABILITY: PHYSICAL HEALTH: ON AVERAGE, HOW MANY MINUTES DO YOU ENGAGE IN EXERCISE AT THIS LEVEL?: 20 MIN

## 2020-09-23 SDOH — HEALTH STABILITY: PHYSICAL HEALTH: ON AVERAGE, HOW MANY DAYS PER WEEK DO YOU ENGAGE IN MODERATE TO STRENUOUS EXERCISE (LIKE A BRISK WALK)?: 2 DAYS

## 2020-09-23 SDOH — SOCIAL STABILITY: SOCIAL NETWORK: ARE YOU MARRIED, WIDOWED, DIVORCED, SEPARATED, NEVER MARRIED, OR LIVING WITH A PARTNER?: MARRIED

## 2020-09-23 ASSESSMENT — PATIENT HEALTH QUESTIONNAIRE - PHQ9
1. LITTLE INTEREST OR PLEASURE IN DOING THINGS: NOT AT ALL
SUM OF ALL RESPONSES TO PHQ9 QUESTIONS 1 AND 2: 0
SUM OF ALL RESPONSES TO PHQ9 QUESTIONS 1 AND 2: 0
2. FEELING DOWN, DEPRESSED OR HOPELESS: NOT AT ALL
CLINICAL INTERPRETATION OF PHQ9 SCORE: NO FURTHER SCREENING NEEDED
CLINICAL INTERPRETATION OF PHQ2 SCORE: NO FURTHER SCREENING NEEDED

## 2020-09-25 ENCOUNTER — MED REC SCAN ONLY (OUTPATIENT)
Dept: FAMILY MEDICINE CLINIC | Facility: CLINIC | Age: 70
End: 2020-09-25

## 2020-09-25 ENCOUNTER — TELEPHONE (OUTPATIENT)
Dept: FAMILY MEDICINE CLINIC | Facility: CLINIC | Age: 70
End: 2020-09-25

## 2020-09-25 DIAGNOSIS — H61.92 SKIN LESION OF LEFT EXTERNAL EAR: Primary | ICD-10-CM

## 2020-09-26 NOTE — TELEPHONE ENCOUNTER
Please call pt. Dr. Deo Bauer noticed a spot on his ear and he is concerned for skin cancer. I would like him to see dermatology. Refer to Dr. Jaqueline Zeng here in town as long as insurance will allow him to see her. Referral placed.

## 2020-09-26 NOTE — TELEPHONE ENCOUNTER
Allyne Leyden returned call and states they got the [facility] message regarding Dr Susie Daniel. States Dr Katherine Eastman discussed his concern at the appointment as well. They will contact derm   No further questions.

## 2020-09-28 DIAGNOSIS — E11.42 TYPE 2 DIABETES MELLITUS WITH DIABETIC POLYNEUROPATHY, WITHOUT LONG-TERM CURRENT USE OF INSULIN (HCC): ICD-10-CM

## 2020-09-28 RX ORDER — PREGABALIN 100 MG/1
CAPSULE ORAL
Qty: 270 CAPSULE | Refills: 0 | Status: SHIPPED | OUTPATIENT
Start: 2020-09-28 | End: 2020-12-14

## 2020-09-30 ENCOUNTER — TELEPHONE (OUTPATIENT)
Dept: FAMILY MEDICINE CLINIC | Facility: CLINIC | Age: 70
End: 2020-09-30

## 2020-09-30 DIAGNOSIS — H93.90 LESION OF EAR: Primary | ICD-10-CM

## 2020-09-30 NOTE — TELEPHONE ENCOUNTER
SPOUSE CALLED AND ADV THAT PT WAS REFERRED TO DR COVARRUBIAS ([facility]). FIRST AVAILABLE IS NOT UNTIL February. WOULD LIKE RECOMMENDATIONS OF SOMEONE ELSE TO GET IN SOONER.     ADV  NOT IN OFFICE TODAY - SPOUSE V/U    THANK YOU

## 2020-10-01 ENCOUNTER — TELEPHONE (OUTPATIENT)
Dept: FAMILY MEDICINE CLINIC | Facility: CLINIC | Age: 70
End: 2020-10-01

## 2020-10-01 DIAGNOSIS — H93.90 LESION OF EAR: Primary | ICD-10-CM

## 2020-10-01 NOTE — TELEPHONE ENCOUNTER
Spoke with Kadi Dhillon at [place] [address] who states they have opening in [place] with Dr Debbie Morris as early as 10/5    Left message for Byron Yun to contact [address] [place] [address] Dermatology and schedule with provider who is able to see patient soonest (see above).   Asked her

## 2020-10-01 NOTE — TELEPHONE ENCOUNTER
Wife is calling about referral for dermatologist.  Both of the previous referrals can not be seen til Dec. Please advise.

## 2020-10-06 ENCOUNTER — OFFICE VISIT (OUTPATIENT)
Dept: NEPHROLOGY | Facility: CLINIC | Age: 70
End: 2020-10-06
Payer: MEDICARE

## 2020-10-06 VITALS
SYSTOLIC BLOOD PRESSURE: 118 MMHG | HEART RATE: 64 BPM | DIASTOLIC BLOOD PRESSURE: 70 MMHG | BODY MASS INDEX: 29.46 KG/M2 | RESPIRATION RATE: 16 BRPM | WEIGHT: 205.81 LBS | HEIGHT: 70 IN

## 2020-10-06 DIAGNOSIS — N18.32 STAGE 3B CHRONIC KIDNEY DISEASE (HCC): Primary | ICD-10-CM

## 2020-10-06 DIAGNOSIS — I10 ESSENTIAL HYPERTENSION: ICD-10-CM

## 2020-10-06 PROCEDURE — 3008F BODY MASS INDEX DOCD: CPT | Performed by: INTERNAL MEDICINE

## 2020-10-06 PROCEDURE — 3074F SYST BP LT 130 MM HG: CPT | Performed by: INTERNAL MEDICINE

## 2020-10-06 PROCEDURE — 3078F DIAST BP <80 MM HG: CPT | Performed by: INTERNAL MEDICINE

## 2020-10-06 PROCEDURE — 99214 OFFICE O/P EST MOD 30 MIN: CPT | Performed by: INTERNAL MEDICINE

## 2020-10-06 RX ORDER — ATORVASTATIN CALCIUM 10 MG/1
10 TABLET, FILM COATED ORAL NIGHTLY
COMMUNITY
End: 2021-01-04

## 2020-10-06 NOTE — PROGRESS NOTES
Nephrology Progress Note      ASSESSMENT/PLAN:        1) CKD 3- baseline Cr 2-2.5 mg/dL is due to hypertensive nephrosclerosis, diabetic nephropathy, chronic NSAID use and history of right-sided urinary obstruction due to complete ureteral compression by m colon CA   • CKD (chronic kidney disease) stage 3, GFR 30-59 ml/min    • Colon cancer (HCC)     Dx in 2015: tx with colon resection   • Diabetes ([facility]    • Diabetes mellitus ([facility]    • Diabetic peripheral neuropathy [facility]    • Diarrhea, unspecified    • Es REPLACEMENT FEMORAL APPROACH N/A 2/2/2017    Performed by Jono Silver MD at [facility] CVOR   • OTHER      amputation Left great toe due to gangrene   • OTHER SURGICAL HISTORY      colon resection   • OTHER SURGICAL HISTORY      On 2/2/2017: TAVR for aortic st Icosapent Ethyl (VASCEPA) 1 g Oral Cap Take 1 g by mouth 2 (two) times daily. 2 pills 2 times daily      • Doxylamine Succinate, Sleep, (UNISOM OR) Take 1 tablet by mouth nightly as needed.      • Multiple Vitamins-Minerals (CENTRUM SILVER OR) Chew 1 tablet

## 2020-10-22 ENCOUNTER — MED REC SCAN ONLY (OUTPATIENT)
Dept: FAMILY MEDICINE CLINIC | Facility: CLINIC | Age: 70
End: 2020-10-22

## 2020-11-05 ENCOUNTER — MED REC SCAN ONLY (OUTPATIENT)
Dept: FAMILY MEDICINE CLINIC | Facility: CLINIC | Age: 70
End: 2020-11-05

## 2020-11-29 DIAGNOSIS — E11.42 TYPE 2 DIABETES MELLITUS WITH DIABETIC POLYNEUROPATHY, WITHOUT LONG-TERM CURRENT USE OF INSULIN (HCC): ICD-10-CM

## 2020-11-30 RX ORDER — GLIPIZIDE 10 MG/1
TABLET, FILM COATED, EXTENDED RELEASE ORAL
Qty: 90 TABLET | Refills: 0 | Status: SHIPPED | OUTPATIENT
Start: 2020-11-30

## 2020-11-30 NOTE — TELEPHONE ENCOUNTER
Diabetic Medication Protocol [id number]/29/2020 01:48 PM   HgBA1C procedure resulted in past 6 months Protocol Details    Last HgBA1C < 7.5     Microalbumin procedure in past 12 months or taking ACE/ARB     Appointment in past 6 or next 3 month      Last OV

## 2020-11-30 NOTE — TELEPHONE ENCOUNTER
Future Appointments   Date Time Provider Alexei Snow   12/24/2020 10:45 AM [facility] [place] Lanza   10/13/2021 10:00 AM Cristo Kolb MD [facility] [place]

## 2020-12-10 ENCOUNTER — PATIENT OUTREACH (OUTPATIENT)
Dept: FAMILY MEDICINE CLINIC | Facility: CLINIC | Age: 70
End: 2020-12-10

## 2020-12-14 DIAGNOSIS — E11.42 TYPE 2 DIABETES MELLITUS WITH DIABETIC POLYNEUROPATHY, WITHOUT LONG-TERM CURRENT USE OF INSULIN (HCC): ICD-10-CM

## 2020-12-14 RX ORDER — PREGABALIN 100 MG/1
CAPSULE ORAL
Qty: 270 CAPSULE | Refills: 0 | Status: SHIPPED | OUTPATIENT
Start: 2020-12-14

## 2020-12-21 ENCOUNTER — OFFICE VISIT (OUTPATIENT)
Dept: FAMILY MEDICINE CLINIC | Facility: CLINIC | Age: 70
End: 2020-12-21
Payer: MEDICARE

## 2020-12-21 VITALS
DIASTOLIC BLOOD PRESSURE: 80 MMHG | BODY MASS INDEX: 29.92 KG/M2 | SYSTOLIC BLOOD PRESSURE: 138 MMHG | HEART RATE: 69 BPM | OXYGEN SATURATION: 98 % | RESPIRATION RATE: 20 BRPM | HEIGHT: 70 IN | WEIGHT: 209 LBS | TEMPERATURE: 98 F

## 2020-12-21 DIAGNOSIS — I73.9 PVD (PERIPHERAL VASCULAR DISEASE) (HCC): ICD-10-CM

## 2020-12-21 DIAGNOSIS — E11.42 TYPE 2 DIABETES MELLITUS WITH DIABETIC POLYNEUROPATHY, WITHOUT LONG-TERM CURRENT USE OF INSULIN (HCC): Primary | ICD-10-CM

## 2020-12-21 DIAGNOSIS — Z89.412 STATUS POST AMPUTATION OF LEFT GREAT TOE (HCC): ICD-10-CM

## 2020-12-21 PROCEDURE — 3008F BODY MASS INDEX DOCD: CPT | Performed by: FAMILY MEDICINE

## 2020-12-21 PROCEDURE — 3079F DIAST BP 80-89 MM HG: CPT | Performed by: FAMILY MEDICINE

## 2020-12-21 PROCEDURE — 3075F SYST BP GE 130 - 139MM HG: CPT | Performed by: FAMILY MEDICINE

## 2020-12-21 PROCEDURE — 99214 OFFICE O/P EST MOD 30 MIN: CPT | Performed by: FAMILY MEDICINE

## 2020-12-21 NOTE — PROGRESS NOTES
HPI:   Mahendra Kearney is a 79year old male who presents for recheck of his diabetes. Patient’s FBS have been not checked. Last visit with ophthalmologist was over a yr ago. Pt has been checking his feet on a regular basis.  Pt denies any tingling of the <=30.0 ug/mg Final   03/31/2016 61.2 (H) <=30.0 ug/mg Final       Current Outpatient Medications   Medication Sig Dispense Refill   • pregabalin (LYRICA) 100 MG Oral Cap Take 1 capsule by mouth 3 times a day as directed by physician.  270 capsule 0   • GLIP 10/18/2016   • High blood pressure    • High cholesterol    • History of blood transfusion     March/2016   • History of osteomyelitis 7/7/2016   • History of stomach ulcers     recent gi bleed-March/2016   • History of stroke 2004   • Hyperlipidemia    • AMPUTATION Left 5/21/2016    Performed by Amirah Bowden DPM at [facility] MAIN OR   • UPPER GI ENDOSCOPY,EXAM     • VALVE REPAIR        Social History: Social History    Tobacco Use      Smoking status: Former Smoker        Packs/day: 1.50        Years: 25.00 return in 6 months.      Type 2 diabetes mellitus with diabetic polyneuropathy, without long-term current use of insulin (hcc)  (primary encounter diagnosis)  Pvd (peripheral vascular disease) (hcc)  Status post amputation of left great toe (hcc)  - check l

## 2021-01-04 ENCOUNTER — TELEPHONE (OUTPATIENT)
Dept: FAMILY MEDICINE CLINIC | Facility: CLINIC | Age: 71
End: 2021-01-04

## 2021-01-04 RX ORDER — ATORVASTATIN CALCIUM 10 MG/1
10 TABLET, FILM COATED ORAL NIGHTLY
Qty: 90 TABLET | Refills: 1 | Status: SHIPPED | OUTPATIENT
Start: 2021-01-04

## 2021-01-04 RX ORDER — HYDRALAZINE HYDROCHLORIDE 25 MG/1
TABLET, FILM COATED ORAL
Qty: 180 TABLET | Refills: 1 | Status: SHIPPED | OUTPATIENT
Start: 2021-01-04

## 2021-01-04 RX ORDER — AMLODIPINE BESYLATE 10 MG/1
TABLET ORAL
Qty: 90 TABLET | Refills: 1 | Status: SHIPPED | OUTPATIENT
Start: 2021-01-04

## 2021-01-07 ENCOUNTER — TELEPHONE (OUTPATIENT)
Dept: FAMILY MEDICINE CLINIC | Facility: CLINIC | Age: 71
End: 2021-01-07

## 2021-01-07 NOTE — TELEPHONE ENCOUNTER
L/M for pt to call back-when he does call back need to schedule Medicare Super visit. Waiting pt call back.

## 2021-01-25 DIAGNOSIS — Z23 NEED FOR VACCINATION: ICD-10-CM

## 2021-03-26 DIAGNOSIS — E11.42 TYPE 2 DIABETES MELLITUS WITH DIABETIC POLYNEUROPATHY, WITHOUT LONG-TERM CURRENT USE OF INSULIN (HCC): ICD-10-CM

## 2021-04-21 ENCOUNTER — APPOINTMENT (OUTPATIENT)
Dept: CARDIOLOGY | Facility: CLINIC | Age: 71
End: 2021-04-21

## 2022-01-24 NOTE — TELEPHONE ENCOUNTER
Patient Education     Pneumonia (Adult)  Pneumonia is an infection deep in the lungs. It is in the small air sacs (alveoli). It may be caused by a virus, fungus, or bacteria. Pneumonia caused by bacteria is often treated with an antibiotic. Severe cases may need to be treated in the hospital. Milder cases can be treated at home. Pneumonia symptoms are a lot like flu symptoms. They include fever, cough (dry or with phlegm), headache, muscle weakness, and pain. These symptoms often get worse in the first 2 days. But they often start to get better in the first week of treatment.    Home care  Follow these guidelines when caring for yourself at home:  · Get plenty of rest. Don’t let yourself get overly tired when you go back to your activities. Participate in activities as directed by your healthcare provider.  · Stop smoking. This is the most important step you can take to help treat pneumonia. If you need help stopping smoking, talk with your healthcare provider.  · Stay away from smoke and other irritants. Stay away from secondhand smoke. Don’t let anyone smoke in your home.  · Prevent lung infections. Ask your healthcare provider about the flu and pneumonia vaccines. Take steps to prevent colds and other lung infections.  · Practice correct handwashing. Wash your hands often with soap and water. Use hand  when you can’t wash your hands. Stay away from crowds during cold and flu season.  · Use pain medicine as directed. You may use acetaminophen or ibuprofen to control fever or pain, unless another medicine was prescribed. If you have chronic liver or kidney disease, talk with your healthcare provider before using these medicines. Also talk with your provider if you’ve had a stomach ulcer or GI (gastrointestinal) bleeding. Don’t give aspirin to a child younger than age 19 unless directed by the provider. Taking aspirin can put a child at risk for Reye syndrome. This is a rare but very serious disorder. It  Last refilled on 1/24/18 for # 90 with 0 rf. Last labs 1/22/18. Last seen on 3/6/18.   Future Appointments  Date Time Provider Alexei Snow   4/25/2018 9:00 AM [id number] [place]   5/8/2018 1:15 PM Blossom Smith MD PF HEM ONC [place] Problem: Safety  Goal: Will remain free from falls  Outcome: PROGRESSING AS EXPECTED  Pt is in room near nurses station. Fall precautions in place. Educated provided.     Problem: Mobility  Goal: Risk for activity intolerance will decrease  Outcome: PROGRESSING AS EXPECTED  Pt encouraged to ambulate around unit with RN at least once, pt verbalized understanding.        most often affects the brain and the liver.  · Eat a light diet as needed. You may not feel like eating, so a light diet is fine. Follow the treatment plan as advised by your healthcare provider.  · Drink plenty of water and fluids. This can make mucus thinner and easier to cough up. Ask your healthcare provider how much water you should drink. For many people, 6 to 8 glasses (8 ounces each) a day is a good goal. Other fluids include sport drinks, sodas without caffeine, juices, tea, or soup. If you also have heart or kidney disease, check with your provider before you drink extra fluids.  · Finish all prescription medicine. Take antibiotic or antiviral medicine as prescribed by your healthcare provider, even if you are feeling better after a few days. Take the medicine until it is all gone.  · Try to stay away from air pollution. If you live in an area with air pollution, track the Air Quality Index (AQI) reports and plan your outdoor activities with the AQI recommendations in mind  Follow-up care  Follow up with your healthcare provider in the next 2 to 3 days, or as advised. This is to be sure the medicine is helping you get better.  If you are 65 or older, you should get a pneumococcal vaccine and a yearly flu (influenza) shot. You should also get these vaccines if you have chronic lung disease such as asthma, emphysema, or COPD. A second type of pneumonia vaccine is also available for people over age 65 and those younger than 65 with certain health conditions. Talk with your healthcare provider about which pneumococcal vaccine is best for you.  Call 911  Call 911 if any of these occur:  · Unable to speak or swallow  · Lips or skin looks blue, purple, or gray  · Feeling dizzy or faint  · Unable to wake up or loss of consciousness  · Feeling of doom  · Trouble breathing or wheezing  · Shortness of breath gets worse or doesn't get better with treatment  · Rapid breathing (more than 25 breaths per  minute)  · Coughing up blood  · Chest pain gets worse with breathing or doesn't get better with treatment  When to get medical advice  Call your healthcare provider right away if any of these occur:  · You don’t get better in the first 2 days of treatment  · Fever of 100.4°F (38°C) or higher, or as directed by your healthcare provider  · Shaking chills  · Cough with phlegm that doesn't get better, or get worse  · Shortness of breath with activities  · Weakness, dizziness, or fainting that gets worse  · Thirst or dry mouth that gets worse  · Sinus pain, headache, or a stiff neck  · Chest pain with breathing or coughing  · Symptoms that get worse or not improving  Kenn last reviewed this educational content on 11/1/2019 © 2000-2021 [employer]. All rights reserved. This information is not intended as a substitute for professional medical care. Always follow your healthcare professional's instructions.

## 2023-02-09 NOTE — PROGRESS NOTES
Subjective    Chief Complaint  This information was obtained from the patient  Patient is here for a follow up of the right 2nd toe. Patient states he decided to not go to the podiatrist. Patient is concerned they will talk about amputation.  Patient would NP discussed various options and recommendations with the patient and his wife this visit. Patient declined to have a consult with Dr. Carolina Hansen stating he will follow up with Dr. Su Faith.  Patient educated on HBO therapy as an adjunct treatment if MRI ordere bp wnl for patient. Pulse Regular and wnl for patient. Aparna Band Respirations easy and unlabored. Temperature wnl. Weight normal for height. . Appearance neat and clean. Appears in no acute distress. Well nourished and well developed.  Height/Length: 72 in (182.88 cm Judgment and insight intact. Alert and oriented times 3. No evidence of depression, anxiety, or agitation. Calm, cooperative, and communicative. Appropriate interactions and affect. Assessment    Active Problems    ICD-10  (Encounter Diagnosis) E11. Protein: Meats, beans, eggs, milk and yogurt particularly [place] yogurt), tofu, soy nuts, soy protein products  Vitamin C: Citrus fruits and juices, strawberries, tomatoes, tomato juice, peppers, baked potatoes, spinach, broccoli, cauliflower, [place] spro CONCLUSION:    No plain film findings to indicate osteomyelitis. MRI results 11-30-17: There is a cutaneous wound along the lateral margin of the fifth metatarsal head that measures approximately 8 mm in greatest dimension.  There is secondary soft tissu Adult

## 2024-02-19 NOTE — PROGRESS NOTES
[address] ambulatory encounter :    [address], [address]  [address] Royce fraser [place] [facility]  [phone]    Assessment:  1. Autism  No changes today  Assessment & Plan: To finishing [employer] high school equivalent in 2024. Plan to transition to [facility] follow up with this. 2. ADHD (attention deficit hyperactivity disorder), combined type  No current medications for this. 3. Plaque psoriasis  Assessment & Plan:  Follows with dermatology, on skyrizi  4. Acne vulgaris  No current concerns. Plan:    Return in about 1 year (around 2/19/2025) for Annual physical.  Instructions noted per AVS (After Visit Summary)/patient instructions. The patient indicates understanding of these issues and agrees with the plan. SUBJECTIVE:  Pam Trammell is  a 24year old choose not to disclose who presented requesting evaluation for establishing care, chronic conditions. Finishing [employer] this year. Currently weekdays. Then will transition to [facility]. Looking forward to the transition over to [facility]. Already have done walk through there and is on waiting list.     Psoriasis, follows with Derm. Gets skyrizi every 3 months. Allergies. Noticed glutens affect him, as do sugars. Organic foods has been working well. Allergic to cats and dust.     OBJECTIVE:  Allergies:   ALLERGIES:   Allergen Reactions    Seasonal Runny Nose     Itchy watery eyes, sneezing       Past Medical Hx:  Past Medical History:   Diagnosis Date    Acne     ADHD (attention deficit hyperactivity disorder)     Autism     Eczema     OCD (obsessive compulsive disorder)     Seborrheic dermatitis of scalp 11/17/2015       Medications  Current Outpatient Medications   Medication Sig Dispense Refill    fluticasone (FLONASE) 50 MCG/ACT nasal spray Spray 2 sprays in each nostril daily.  16 g 12    cetirizine (ZyrTEC) 10 MG tablet Take 1 tablet by mouth daily as needed for Chief Complaint  This information was obtained from the patient  Patient is here for a left foot wound. He denies any pain to the wound.     Allergies  NKDA    HPI  This information was obtained from the patient  11-22-17 INITIAL: patient is a 80 yo white, Allergies. 90 tablet 1    olopatadine (Patanol) 0.1 % ophthalmic solution Place 1 drop into both eyes 2 times daily as needed for Allergies. 5 mL 3    riSANKIzumab-rzaa (Skyrizi Pen) 150 MG/ML injection Inject (1) 150 mg/ml pen every 12 weeks. 1 mL 5    budesonide (RHINOCORT ALLERGY) 32 MCG/ACT nasal spray Spray 1 spray in each nostril in the morning and 1 spray in the evening. 8.6 mL 3    levocetirizine (XYZAL) 5 MG tablet Take 1 tablet by mouth every evening for 7 days. 7 tablet 0    ketoconazole (NIZORAL) 2 % shampoo Lather to scalp or other affected areas 3x/weekly in shower as needed. Leave on 30 secs before rinsing. 120 mL 11    hydroCORTisone (CORTIZONE) 2.5 % cream Apply twice daily to affected areas of psoriasis on the face, forehead and ears, use for 3 weeks with 1 week break, repeat as needed. 30 g 3    clobetasol (Clobex) 0.05 % shampoo Use as a shampoo on the scalp 3 times a week for scalp psoriasis. 118 mL 3    triamcinolone (ARISTOCORT) 0.1 % cream Apply twice daily to affected areas on the trunk and extremities. Use for 3 weeks then take 1 week break. Repeat as needed. Avoid using on face, genitals or armpits. 454 g 2     No current facility-administered medications for this visit.        Surgical Hx:  Past Surgical History:   Procedure Laterality Date    Circumcision clamp w reg block penile ring         Family hx:  Family History   Problem Relation Age of Onset    Cancer Father         mother thinks lymphoma    Allergic Rhinitis Father     Osteoarthritis Father     Asthma Father     Vision Loss Father     Heart disease Maternal Grandmother     Allergic Rhinitis Maternal Grandmother     Osteoarthritis Maternal Grandmother     Learning disabilities Maternal Grandmother     Psychiatric Maternal Grandmother     Vision Loss Maternal Grandmother     Hypertension Maternal Grandfather     Allergic Rhinitis Maternal Grandfather     Osteoarthritis Maternal Grandfather     Hearing Loss Maternal Grandfather Pt reports that he is a senior on limited income and is not sure if he wants to spend his money on a vascular procedure to increase blood flow. He was educated about risk of not having blood flow including infection and possible loose of limb.  He stated he "   Learning disabilities Maternal Grandfather     Vision Loss Maternal Grandfather     Hearing Loss Mother     Learning disabilities Mother     Vision Loss Sister     Osteoarthritis Paternal Grandmother     Vision Loss Paternal Grandmother     Thyroid Paternal Grandmother     Osteoarthritis Paternal Grandfather     Vision Loss Paternal Grandfather     Vision Loss Sister        Social hx:  Social History     Tobacco Use    Smoking status: Never     Passive exposure: Yes    Smokeless tobacco: Never   Vaping Use    Vaping Use: never used   Substance Use Topics    Alcohol use: Never     Alcohol/week: 0.0 standard drinks of alcohol    Drug use: Never     Sexual hx: No concerns. REVIEW OF SYSTEMS:   As per HPI    PHYSICAL EXAM:  Visit Vitals  /68 (BP Location: LUE - Left upper extremity, Patient Position: Sitting, Cuff Size: Regular)   Pulse 71   Temp 97.4 Â°F (36.3 Â°C) (Tympanic)   Ht 5' 11"" (1.803 m)   Wt 72.7 kg (160 lb 3.2 oz)   SpO2 99%   BMI 22.34 kg/mÂ²     Constitutional:  Well developed, well nourished choose not to disclose in no acute distress. Skin:  Warm, dry, intact without rash or lesion. HEENT:  Conjunctivae clear. No icterus. Lungs:  No labored breathing, no accessory muscle use. Cardiovascular:  Regular rate. Abdomen:  Non-distended. Neurologic:  Alert and oriented x 3, Symmetric ROM. Psychiatric:  Speech and behavior appropriate.      LAB RESULTS:      " bp wnl for patient. Pulse Regular and wnl for patient. Slime Riedel Respirations easy and unlabored. Temperature wnl. Overweight. Appearance neat and clean. Appears in no acute distress. Well nourished and well developed.  Height/Length: 72 in (182.88 cm), Weight: 212 Judgment and insight intact. Alert and oriented times 3. patient with flat affect, but cooperative with exam and answers questions appropriately.         Assessment    Active Problems    ICD-10  (Encounter Diagnosis) E11.59 - Type 2 diabetes mellitus with o a. Increase in redness/red “streaks from the wound  b. Increase in swelling  c. Fever  d. Unusual odor  e. Increased or change in amount or color of drainage.   3 Should you experience any significant changes in your wound(s) or have any questions regarding Last albumin date and value: - November 2017 albumin 3.7/tp7.8  Osteomyelitis suspected due to: - MRI results 11-30-17: There is a cutaneous wound along the lateral margin of the fifth metatarsal head that measures approximately 8 mm in greatest dimension. we discussed that he will continue the gent for a total of 10-14 days as well as complete the po abx. he will arrange a time with [facility] dr Isabel Tucker or fercho if he chooses t address the circulation issue.  we will see him back in 3 weeks and at that time i

## 2024-03-21 NOTE — PROGRESS NOTES
ERP at bedside   Subjective    Chief Complaint  This information was obtained from the patient  Patient is here for a Rn visit. He denies any issues with the cast. Patient stated no pain.     Allergies  NKDA    HPI  This information was obtained from the patient, caregiver Integumentary (Hair/Skin/Nails): Calluses/Corns (none today), Ulcers  Neurological: Loss of Protective Sensation (bilaterally insensate)  Psychiatric: Nervousness / Tension    Patient denies complaints or symptoms related to:   Constitutional Symptoms (Gen Wound #1 Left, Lateral Foot is a chronic [place] Grade 3 Diabetic Ulcer and has received a status of Bridged. Subsequent wound encounter measurements are 0.3cm length x 0.7cm width with no measurable depth, with an area of 0.21 sq cm .  No tunneling has been Cleanse with Vashe - soak with vashe with each TCC change  Collagen - endoform ag (patient will bring back the remaining sample piece)  Hydrofera Transfer   Kerramax/Super absorbent - bordered aquacel  Change dressing every: - WEEKLY    Off-Loading  TCC - Osteomyelitis suspected due to: - MRI results 11-30-17: There is a cutaneous wound along the lateral margin of the fifth metatarsal head that measures approximately 8 mm in greatest dimension.  There is secondary soft tissue edema/cellulitis extending from

## 2024-08-06 NOTE — TELEPHONE ENCOUNTER
Is This A New Presentation, Or A Follow-Up?: Nail Dystrophy Script has been faxed to [facility]

## (undated) DEVICE — STERILE POLYISOPRENE POWDER-FREE SURGICAL GLOVES: Brand: PROTEXIS

## (undated) DEVICE — PRECISION (5.5 X 0.51 X 18.0MM)

## (undated) DEVICE — GAUZE SPONGES,12 PLY: Brand: CURITY

## (undated) DEVICE — MEDI-VAC NON-CONDUCTIVE SUCTION TUBING: Brand: CARDINAL HEALTH

## (undated) DEVICE — ENDOSCOPY PACK - LOWER: Brand: MEDLINE INDUSTRIES, INC.

## (undated) DEVICE — REM POLYHESIVE ADULT PATIENT RETURN ELECTRODE: Brand: VALLEYLAB

## (undated) DEVICE — CELL SAVER RESERVOIR BRAT

## (undated) DEVICE — ZIMMER® STERILE DISPOSABLE TOURNIQUET CUFF WITH PLC, DUAL PORT, SINGLE BLADDER, 18 IN. (46 CM)

## (undated) DEVICE — SOL LACT RINGERS 1000ML

## (undated) DEVICE — Device: Brand: DEFENDO AIR/WATER/SUCTION AND BIOPSY VALVE

## (undated) DEVICE — TRANSPOSAL ULTRAFLEX DUO/QUAD ULTRA CART MANIFOLD

## (undated) DEVICE — 3M™ RED DOT™ MONITORING ELECTRODE WITH FOAM TAPE AND STICKY GEL, 50/BAG, 20/CASE, 72/PLT 2570: Brand: RED DOT™

## (undated) DEVICE — CELL SAVER BAG 600ML 4R2023

## (undated) DEVICE — 1200CC GUARDIAN II: Brand: GUARDIAN

## (undated) DEVICE — Device: Brand: DISPOSABLE ELECTROSURGICAL SNARE

## (undated) DEVICE — KENDALL SCD EXPRESS SLEEVES, KNEE LENGTH, MEDIUM: Brand: KENDALL SCD

## (undated) DEVICE — BLADE STERNAL SAW BULK PACK

## (undated) DEVICE — BANDAGE ROLL,100% COTTON, 6 PLY, LARGE: Brand: KERLIX

## (undated) DEVICE — PREMIUM WET SKIN PREP TRAY: Brand: MEDLINE INDUSTRIES, INC.

## (undated) DEVICE — GLOVE SURG TRIUMPH SZ 8

## (undated) DEVICE — SOLUTION SURG DURA PREP HAZMAT

## (undated) DEVICE — CELL SAVER 5/5+ BOWL KIT-225ML: Brand: HAEMONETICS CELL SAVER 5/5+ SYSTEMS

## (undated) DEVICE — #15 STERILE STAINLESS BLADE: Brand: STERILE STAINLESS BLADES

## (undated) DEVICE — SUTURE PROLENE 4-0 PS-2

## (undated) DEVICE — OPEN HEART: Brand: MEDLINE INDUSTRIES, INC.

## (undated) DEVICE — FORCEP BIOPSY RJ4 LG CAP W/ND

## (undated) DEVICE — SOL  .9 1000ML BTL

## (undated) DEVICE — SPONGE RAYTEC 4X4 RF DETECT

## (undated) DEVICE — NEEDLE CONTRAST INTERJECT 25G

## (undated) DEVICE — SUPER SPONGES,MEDIUM: Brand: KERLIX

## (undated) DEVICE — CLIP RESOLUTION 235CM

## (undated) DEVICE — SNARE CAPTIFLEX MICRO-OVL OLY

## (undated) DEVICE — UNDYED BRAIDED (POLYGLACTIN 910), SYNTHETIC ABSORBABLE SUTURE: Brand: COATED VICRYL

## (undated) DEVICE — 3M™ TEGADERM™ TRANSPARENT FILM DRESSING, 1626W, 4 IN X 4-3/4 IN (10 CM X 12 CM), 50 EACH/CARTON, 4 CARTON/CASE: Brand: 3M™ TEGADERM™

## (undated) DEVICE — CLAMP INSERT: Brand: STEALTH® CLAMP INSERT

## (undated) DEVICE — MEDI-VAC SUCTION HANDLE REGULAR CAPACITY: Brand: CARDINAL HEALTH

## (undated) DEVICE — LOWER EXTREMITY CDS-LF: Brand: MEDLINE INDUSTRIES, INC.

## (undated) DEVICE — FLOSEAL SEALENT STERILE 10ML

## (undated) DEVICE — TAPE UMBILICAL U11T

## (undated) NOTE — LETTER
[address] [address]    5/20/2019      Dear  [facility]    In order to provide the highest quality care, JOSUE Ying uses a sophisticated computer system to track ou

## (undated) NOTE — MR AVS SNAPSHOT
[address] [facility] [address] [phone]               Thank you for choosing us for your health care visit with Sanaz Ash DO.   We are glad to serve you and happy to provide you with this sum ECHO 2D W DOPPLER with [facility] CARD ECHO RM [address] Echocardiography [facility])    [place] [address] [phone]           Please wear slacks and top for your comfort - do not wear a dress. Bring a paper prescription for each of these medications    - pregabalin 100 MG Caps            Today's Orders     Nephrology Referral - In Network    Complete by:  As directed    Assoc Dx:  Chronic kidney disease, stage 3, mod decreased GFR [N18.3] discharge instructions in [facility] by going to Visits < Admission Summaries. If you've been to the Emergency Department or your doctor's office, you can view your past visit information in [facility] by going to Visits < Visit Summaries. [place]oomio questions? ? Use a cane or walker (indoors and out) if you are unsteady on your feet.              Visit [facility] online at  [email or web address]

## (undated) NOTE — MR AVS SNAPSHOT
[facility], [address], [address] [address] [phone]               Thank you for choosing us for your health care visit with Ana Santiago MD.  We are glad to serve you and happy to provide you with this Atorvastatin Calcium 10 MG Tabs   Take 1 tablet (10 mg total) by mouth nightly. Commonly known as:  LIPITOR           Clopidogrel Bisulfate 75 MG Tabs   Take 1 tablet (75 mg total) by mouth daily.    Commonly known as:  PLAVIX           fenofibrate micro Call [phone] for help. [facility] is NOT to be used for urgent needs. For medical emergencies, dial 911. Educational Information     Your blood pressure indicates you may be at-risk for Hypertension.    Please consider the following Lifestyle M active are less likely to develop some chronic diseases than adults who are inactive.      HOW TO GET STARTED: HOW TO STAY MOTIVATED:   Start activities slowly and build up over time Do what you like   Get your heart pumping – brisk walking, biking, swimmin

## (undated) NOTE — LETTER
[facility] Testing Department  Phone: [phone]  Right Fax: [phone]  ABNORMAL VALUES FAX    Sent By:  Juana Medel Rn Date: 11/20/18    [facility]  Surgery Date: 11/24/2018    CSN: [id number]  Medical Record: [id number]

## (undated) NOTE — IP AVS SNAPSHOT
[facility] [place] [address] [place], [address] ~ [phone]                After Visit Summary   3/8/2017    Kendy Browning    MRN: [id number]           Visit Information        Provider Department    3/8/2017  8:30 AM Hari Lira [facility] Echocardiography \[facility] [place] [facility]\"")    [place]  [address] [place] [address]   [phone]            Apr 06, 2017  1:00 PM   Exam - Established Patient with Arva Holstein, MD [address] Nephrology (E

## (undated) NOTE — LETTER
[address] E  [facility], [address] [facility]    Consent for Operation  Date: __________________                                Time: _______________    1.  I authorize the performance upon Bronwyn Alvarez the following operation:  Procedur procedure has been videotaped, the surgeon will obtain the original videotape. The hospital will not be responsible for storage or maintenance of this tape.   7. For the purpose of advancing medical education, I consent to the admittance of observers to the STATEMENTS REQUIRING INSERTION OR COMPLETION WERE FILLED IN.     Signature of Patient:   ___________________________    When the patient is a minor or mentally incompetent to give consent:  Signature of person authorized to consent for patient: ____________ supplements, and pills I can buy without a prescription (including street drugs/illegal medications). Failure to inform my anesthesiologist about these medicines may increase my risk of anesthetic complications. iv.  If I am allergic to anything or have ha Anesthesiologist Signature     Date   Time  I have discussed the procedure and information above with the patient (or patient’s representative) and answered their questions. The patient or their representative has agreed to have anesthesia services.     ___

## (undated) NOTE — IP AVS SNAPSHOT
[facility] [place] [address] [place], [address] ~ [phone]                Discharge Summary   2/1/2017    Katharine Nickerson           Admission Information        Provider Department    2/1/2017 Armando Abdullahi MD  8ne-A         Susy Fall Take 81 mg by mouth daily. Atorvastatin Calcium 10 MG Tabs   Last time this was given:  10 mg on 2/3/2017  9:27 PM   Commonly known as:  LIPITOR   Next dose due:   Tonight 2/4/17        Take 1 tablet (10 mg total) by mouth nightl - Clopidogrel Bisulfate 75 MG Tabs  - fenofibrate micronized 67 MG Caps              Patient Instructions       Instructions after Transcatheter Valve Replacement:    ACTIVITY:  · Do NOT drive until MD gives his/her clearance.   · Walk at least 3 times per · You should see your cardiologist 2 weeks after discharge. · You will be seen in the Valve Clinic 1 month after discharge; if not given an appointment please call [phone] to make an appointment.    · You will need an echocardiogram at 1 month, 6 mon BASIC METABOLIC PANEL (8)  1/2/2822 (Approximate) 2/11/2017    Questions:      [facility]: Has the patient fasted?:      Spec comment:      CBC WITH DIFFERENTIAL WITH PLATELET  4/7/6546 (Approximate) 2/11/2017    Questions:      Spec comment:        Jean Vasquez (02/02/17)  19 (02/02/17)  0.8 (02/02/17)  6.6 (02/02/17)  3.1 (L) (02/04/17)  141 (02/04/17)  3.8 (02/04/17)  109      Pending Labs     Order Current Status    SHIGATOXIN In process    STOOL CULTURE W/SHIGATOXIN In process    STOOL CULTURE(P) Preliminary _____________________________________________________________________________    Medication Side Effects - Medications to be taken at home  As your caregivers, we want you to be aware of the medications you are prescribed to take and their potential SIDE E dark, loose bowel movements           Blood Sugar Medications     GlipiZIDE ER 5 MG Oral Tablet 24 Hr         Use:  Treat high blood sugar   Most common side effects: Low blood sugar:nausea, jitters, sweating, rapid heartbeat    What to report to your heal

## (undated) NOTE — MR AVS SNAPSHOT
After Visit Summary   5/25/2017    Ace Bazzi    MRN: [id number]           Diagnoses this Visit     Cancer of sigmoid colon [facility])    -  Primary     Magnetic resonance imaging of brain abnormal         Lung nodule           Allergies     No Kno Thursday May 25, 2017     LAB:  COMP METABOLIC PANEL (14)        Thursday May 25, 2017     Imaging:  CT CHEST+ABDOMEN+PELVIS(CPT=71250/43119)        Thursday May 25, 2017     Imaging:  MRI BRAIN (CPT=70551)        Monday June 05, 2017  9:30 AM     Appoint Desk: Mon-Sat 10:00am-1:00pm [address]  [place], [address] INVASIVE SURGERY [facility] Reception Desk: Mon-Fri 8:00am-8:00pm [id number] [address] [place] [address] E, [place] [address] Carlson Alvaro [address]  Drinking Instructions (included when you  contrast or if your physici exam.  To ENSURE YOUR SAFETY: If you have a medical implant device it is extremely important to present documentation of the implant information at time of your appointment.   FOR FEMALES HAVING GADOLINIUM EXAMS :If you are breastfeeding and your exam requi Component Value Flag Ref Range Units Status    Glucose 151 (H) 70-99  mg/dL Final    BUN 25 (H) 8-20  mg/dL Final    Creatinine 2.76 (H) 0.70-1.30  mg/dL Final    GFR 23 (L) >=60   Final    Comment:       Estimated GFR units: mL/min/1.73 square meters   e Immature Granulocyte Absolute 0.02  0.00-1.00  x10(3) uL Final    Neutrophil % 62.7   % Final    Lymphocyte % 23.8   % Final    Monocyte % 7.6   % Final    Eosinophil % 4.9   % Final    Basophil % 0.7   % Final    Immature Granulocyte % 0.3   % Final

## (undated) NOTE — MR AVS SNAPSHOT
[address] [facility]  [address] [facility], [address] [address] [phone]               Thank you for choosing us for your health care visit with Ines Akers MD.  We are glad to serve you and happy to provide you with this Return in about 4 weeks (around 3/30/2017).          Reason for Today's Visit     Chronic Kidney Disease     Hypertension           Medical Issues Discussed Today     CKD (chronic kidney disease), stage 3 (moderate)    -  Primary    Edema, unspecified type You can access your MyChart to more actively manage your health care and view more details from this visit by going to [email or web address].   If you've recently had a stay at the Hospital you can access your discharge instructions in [address] by herson

## (undated) NOTE — LETTER
[facility] [address], [address] [place] [address]    Consent for Anesthesia   1.   Ace ADAM agree to be cared for by an anesthesiologist, who is specially trained to monitor me and give me medicine to put me to sleep or keep me comfort vision, nerves, or muscles and in extremely rare instances death. 5. My doctor has explained to me other choices available to me for my care (alternatives).   6. Pregnant Patients (“epidural”):  I understand that the risks of having an epidural (medicine g

## (undated) NOTE — LETTER
05/23/18        [address] [address]      Dear Daria Steele records indicate that you have outstanding lab work and or testing that was ordered for you and has not yet been completed:  Lab Frequency Next

## (undated) NOTE — LETTER
[facility]  Prasanna Llamas [address] [facility], [address] [place] [address]    Consent for Operation    Date: __________________    Time: _______________    1.  I authorize the performance upon Clement Shank the following operation:    Procedure(s):  transcatheter aroti procedure has been videotaped, the surgeon will obtain the original videotape. The hospital will not be responsible for storage or maintenance of this tape.     6. For the purpose of advancing medical education, I consent to the admittance of observers to t STATEMENTS REQUIRING INSERTION OR COMPLETION WERE FILLED IN.     Signature of Patient:   ___________________________    When the patient is a minor or mentally incompetent to give consent:  Signature of person authorized to consent for patient: ____________ supplements, and pills I can buy without a prescription (including street drugs/illegal medications). Failure to inform my anesthesiologist about these medicines may increase my risk of anesthetic complications.   · If I am allergic to anything or have had Anesthesiologist Signature     Date   Time  I have discussed the procedure and information above with the patient (or patient’s representative) and answered their questions. The patient or their representative has agreed to have anesthesia services.     ___

## (undated) NOTE — Clinical Note
Please call Dr. Jacob Broussard office and let them know he is not cleared at this time, needs cardiac clearance.

## (undated) NOTE — LETTER
[facility] [address], [address]  Consent for Procedure/Sedation    Date:     Time:       1.  I authorize the performance upon Judit Washington the following: Peripheral angiography, atherectomy, percutaneous transluminal angiopla you may develop a skin reaction.       Signature of Patient: _______________________________________________________    Signature of person authorized                                           Relationship to  to consent for patient: _______________________

## (undated) NOTE — LETTER
Date: 11/12/2018    Patient Name: Alize Anne Congress,     I saw [facility] in the office for pre-op clearance for left 2nd toe amputation. He has developed osteomyelitis since last time you saw him.  He is scheduled for this procedure with

## (undated) NOTE — LETTER
[facility] Testing Department  Phone: [phone]  Right Fax: [phone]  [facility] [address] Cassy Terrazas RN Date: 17    Patient Name: Christina Solitario  Surgery Date: 2017    CSN: [id number]  Medical Record: [id number]   :

## (undated) NOTE — LETTER
03/13/20    [address] [address]      Dear Judit Washington. To help us provide the highest quality medical care, [facility] uses a sophisticated computer system to track our patient records.

## (undated) NOTE — LETTER
[address] [address]    1/6/2020      Dear  Jordan Freitas    In order to provide the highest quality care, JOSUE Morales uses a sophisticated computer system to track our